# Patient Record
Sex: MALE | Race: OTHER | NOT HISPANIC OR LATINO | URBAN - METROPOLITAN AREA
[De-identification: names, ages, dates, MRNs, and addresses within clinical notes are randomized per-mention and may not be internally consistent; named-entity substitution may affect disease eponyms.]

---

## 2018-12-18 ENCOUNTER — INPATIENT (INPATIENT)
Facility: HOSPITAL | Age: 43
LOS: 5 days | Discharge: HOME | End: 2018-12-24
Attending: INTERNAL MEDICINE | Admitting: INTERNAL MEDICINE
Payer: MEDICAID

## 2018-12-18 VITALS
OXYGEN SATURATION: 97 % | HEART RATE: 118 BPM | SYSTOLIC BLOOD PRESSURE: 141 MMHG | DIASTOLIC BLOOD PRESSURE: 101 MMHG | RESPIRATION RATE: 18 BRPM | TEMPERATURE: 97 F

## 2018-12-18 DIAGNOSIS — I42.9 CARDIOMYOPATHY, UNSPECIFIED: ICD-10-CM

## 2018-12-18 DIAGNOSIS — J06.9 ACUTE UPPER RESPIRATORY INFECTION, UNSPECIFIED: ICD-10-CM

## 2018-12-18 DIAGNOSIS — I50.31 ACUTE DIASTOLIC (CONGESTIVE) HEART FAILURE: ICD-10-CM

## 2018-12-18 DIAGNOSIS — J96.00 ACUTE RESPIRATORY FAILURE, UNSPECIFIED WHETHER WITH HYPOXIA OR HYPERCAPNIA: ICD-10-CM

## 2018-12-18 DIAGNOSIS — R09.89 OTHER SPECIFIED SYMPTOMS AND SIGNS INVOLVING THE CIRCULATORY AND RESPIRATORY SYSTEMS: ICD-10-CM

## 2018-12-18 DIAGNOSIS — I50.9 HEART FAILURE, UNSPECIFIED: ICD-10-CM

## 2018-12-18 DIAGNOSIS — I71.2 THORACIC AORTIC ANEURYSM, WITHOUT RUPTURE: ICD-10-CM

## 2018-12-18 DIAGNOSIS — I25.10 ATHEROSCLEROTIC HEART DISEASE OF NATIVE CORONARY ARTERY WITHOUT ANGINA PECTORIS: ICD-10-CM

## 2018-12-18 DIAGNOSIS — E66.01 MORBID (SEVERE) OBESITY DUE TO EXCESS CALORIES: ICD-10-CM

## 2018-12-18 DIAGNOSIS — I35.1 NONRHEUMATIC AORTIC (VALVE) INSUFFICIENCY: ICD-10-CM

## 2018-12-18 DIAGNOSIS — I11.0 HYPERTENSIVE HEART DISEASE WITH HEART FAILURE: ICD-10-CM

## 2018-12-18 DIAGNOSIS — I27.20 PULMONARY HYPERTENSION, UNSPECIFIED: ICD-10-CM

## 2018-12-18 LAB
ALBUMIN SERPL ELPH-MCNC: 3.8 G/DL — SIGNIFICANT CHANGE UP (ref 3.5–5.2)
ALP SERPL-CCNC: 86 U/L — SIGNIFICANT CHANGE UP (ref 30–115)
ALT FLD-CCNC: 30 U/L — SIGNIFICANT CHANGE UP (ref 0–41)
ANION GAP SERPL CALC-SCNC: 12 MMOL/L — SIGNIFICANT CHANGE UP (ref 7–14)
AST SERPL-CCNC: 24 U/L — SIGNIFICANT CHANGE UP (ref 0–41)
BASOPHILS # BLD AUTO: 0.08 K/UL — SIGNIFICANT CHANGE UP (ref 0–0.2)
BASOPHILS NFR BLD AUTO: 1 % — SIGNIFICANT CHANGE UP (ref 0–1)
BILIRUB SERPL-MCNC: 1 MG/DL — SIGNIFICANT CHANGE UP (ref 0.2–1.2)
BUN SERPL-MCNC: 16 MG/DL — SIGNIFICANT CHANGE UP (ref 10–20)
CALCIUM SERPL-MCNC: 8.5 MG/DL — SIGNIFICANT CHANGE UP (ref 8.5–10.1)
CHLORIDE SERPL-SCNC: 103 MMOL/L — SIGNIFICANT CHANGE UP (ref 98–110)
CK SERPL-CCNC: 122 U/L — SIGNIFICANT CHANGE UP (ref 0–225)
CO2 SERPL-SCNC: 27 MMOL/L — SIGNIFICANT CHANGE UP (ref 17–32)
CREAT SERPL-MCNC: 1 MG/DL — SIGNIFICANT CHANGE UP (ref 0.7–1.5)
D DIMER BLD IA.RAPID-MCNC: 581 NG/ML DDU — HIGH (ref 0–230)
EOSINOPHIL # BLD AUTO: 0.15 K/UL — SIGNIFICANT CHANGE UP (ref 0–0.7)
EOSINOPHIL NFR BLD AUTO: 1.9 % — SIGNIFICANT CHANGE UP (ref 0–8)
GAS PNL BLDV: SIGNIFICANT CHANGE UP
GLUCOSE SERPL-MCNC: 97 MG/DL — SIGNIFICANT CHANGE UP (ref 70–99)
HCT VFR BLD CALC: 46.2 % — SIGNIFICANT CHANGE UP (ref 42–52)
HGB BLD-MCNC: 15.2 G/DL — SIGNIFICANT CHANGE UP (ref 14–18)
IMM GRANULOCYTES NFR BLD AUTO: 0.5 % — HIGH (ref 0.1–0.3)
LYMPHOCYTES # BLD AUTO: 2.89 K/UL — SIGNIFICANT CHANGE UP (ref 1.2–3.4)
LYMPHOCYTES # BLD AUTO: 37.3 % — SIGNIFICANT CHANGE UP (ref 20.5–51.1)
MAGNESIUM SERPL-MCNC: 1.9 MG/DL — SIGNIFICANT CHANGE UP (ref 1.8–2.4)
MCHC RBC-ENTMCNC: 27.8 PG — SIGNIFICANT CHANGE UP (ref 27–31)
MCHC RBC-ENTMCNC: 32.9 G/DL — SIGNIFICANT CHANGE UP (ref 32–37)
MCV RBC AUTO: 84.5 FL — SIGNIFICANT CHANGE UP (ref 80–94)
MONOCYTES # BLD AUTO: 0.66 K/UL — HIGH (ref 0.1–0.6)
MONOCYTES NFR BLD AUTO: 8.5 % — SIGNIFICANT CHANGE UP (ref 1.7–9.3)
NEUTROPHILS # BLD AUTO: 3.92 K/UL — SIGNIFICANT CHANGE UP (ref 1.4–6.5)
NEUTROPHILS NFR BLD AUTO: 50.8 % — SIGNIFICANT CHANGE UP (ref 42.2–75.2)
NT-PROBNP SERPL-SCNC: 448 PG/ML — HIGH (ref 0–300)
PLATELET # BLD AUTO: 223 K/UL — SIGNIFICANT CHANGE UP (ref 130–400)
POTASSIUM SERPL-MCNC: 4.5 MMOL/L — SIGNIFICANT CHANGE UP (ref 3.5–5)
POTASSIUM SERPL-SCNC: 4.5 MMOL/L — SIGNIFICANT CHANGE UP (ref 3.5–5)
PROT SERPL-MCNC: 6.6 G/DL — SIGNIFICANT CHANGE UP (ref 6–8)
RBC # BLD: 5.47 M/UL — SIGNIFICANT CHANGE UP (ref 4.7–6.1)
RBC # FLD: 14 % — SIGNIFICANT CHANGE UP (ref 11.5–14.5)
SODIUM SERPL-SCNC: 142 MMOL/L — SIGNIFICANT CHANGE UP (ref 135–146)
TROPONIN T SERPL-MCNC: <0.01 NG/ML — SIGNIFICANT CHANGE UP
TROPONIN T SERPL-MCNC: <0.01 NG/ML — SIGNIFICANT CHANGE UP
WBC # BLD: 7.74 K/UL — SIGNIFICANT CHANGE UP (ref 4.8–10.8)
WBC # FLD AUTO: 7.74 K/UL — SIGNIFICANT CHANGE UP (ref 4.8–10.8)

## 2018-12-18 RX ORDER — FUROSEMIDE 40 MG
40 TABLET ORAL DAILY
Qty: 0 | Refills: 0 | Status: DISCONTINUED | OUTPATIENT
Start: 2018-12-18 | End: 2018-12-19

## 2018-12-18 RX ORDER — INFLUENZA VIRUS VACCINE 15; 15; 15; 15 UG/.5ML; UG/.5ML; UG/.5ML; UG/.5ML
0.5 SUSPENSION INTRAMUSCULAR ONCE
Qty: 0 | Refills: 0 | Status: COMPLETED | OUTPATIENT
Start: 2018-12-18 | End: 2018-12-24

## 2018-12-18 RX ORDER — ASPIRIN/CALCIUM CARB/MAGNESIUM 324 MG
81 TABLET ORAL DAILY
Qty: 0 | Refills: 0 | Status: DISCONTINUED | OUTPATIENT
Start: 2018-12-18 | End: 2018-12-23

## 2018-12-18 RX ORDER — FUROSEMIDE 40 MG
20 TABLET ORAL ONCE
Qty: 0 | Refills: 0 | Status: COMPLETED | OUTPATIENT
Start: 2018-12-18 | End: 2018-12-18

## 2018-12-18 RX ORDER — ENOXAPARIN SODIUM 100 MG/ML
40 INJECTION SUBCUTANEOUS EVERY 24 HOURS
Qty: 0 | Refills: 0 | Status: DISCONTINUED | OUTPATIENT
Start: 2018-12-18 | End: 2018-12-24

## 2018-12-18 RX ORDER — LISINOPRIL 2.5 MG/1
5 TABLET ORAL DAILY
Qty: 0 | Refills: 0 | Status: DISCONTINUED | OUTPATIENT
Start: 2018-12-18 | End: 2018-12-24

## 2018-12-18 RX ORDER — CHLORHEXIDINE GLUCONATE 213 G/1000ML
1 SOLUTION TOPICAL
Qty: 0 | Refills: 0 | Status: DISCONTINUED | OUTPATIENT
Start: 2018-12-18 | End: 2018-12-24

## 2018-12-18 RX ORDER — ASPIRIN/CALCIUM CARB/MAGNESIUM 324 MG
325 TABLET ORAL ONCE
Qty: 0 | Refills: 0 | Status: COMPLETED | OUTPATIENT
Start: 2018-12-18 | End: 2018-12-18

## 2018-12-18 RX ADMIN — LISINOPRIL 5 MILLIGRAM(S): 2.5 TABLET ORAL at 16:38

## 2018-12-18 RX ADMIN — Medication 20 MILLIGRAM(S): at 15:18

## 2018-12-18 RX ADMIN — Medication 325 MILLIGRAM(S): at 10:21

## 2018-12-18 NOTE — H&P ADULT - NSHPSOCIALHISTORY_GEN_ALL_CORE
Denies Smoking, ETOH or illicit drug use Denies Smoking, ETOH or illicit drug use. Denies any risk factor for sexually transmitted disease or history of syphilis.

## 2018-12-18 NOTE — ED PROVIDER NOTE - NS ED ROS FT
Constitutional:  See HPI.   Eyes:  No visual changes, eye pain or discharge.  ENMT:  No hearing changes, pain, discharge or infections. No neck pain or stiffness.  Cardiac:  No  edema.    Respiratory:  No cough or respiratory distress. No hemoptysis.  GI:  No nausea, vomiting, diarrhea, abdominal pain.  :  No dysuria, frequency, hematuria  MS:  No joint pain or back pain.  Neuro:  No LOC. No headache or weakness.    Skin:  No skin rash.  Except as in HPI, all other review of systems is negative

## 2018-12-18 NOTE — ED PROVIDER NOTE - OBJECTIVE STATEMENT
44yo M no sig pmh non smoker pw shortness of breath and chest tightness since this AM- woke up w sx 5am, +orthopnea, upper chest tightness, LUE tingling- never had sx like this prior, no cardiac hx. Recently returned from [place], 17hr flight. Also co LLE pain, no trauma- no recent illness- no fevers/chills, cough, nausea/vomiting/diarrhea, abdominal pain, numbness/focal weakness, back pain, dysuria/hematuria

## 2018-12-18 NOTE — ED PROVIDER NOTE - MEDICAL DECISION MAKING DETAILS
43 y.o. male, no PMH, comes in c/o SOB which woke him up from sleep at 5am today, associated with some CP. SOB is better when he is sitting up. NO leg pain or swelling. (+) recent travel to [place] ( 17 hr flight). No fever/chills. No cough. No abdominal pain. No n/v/c/d. On exam, pt in NAD, AAOx3, head NC/AT, CN II-XII intact, lungs CTA B/L, CV S1S2 regular, abdomen soft/NT/ND/(+)BS, ext (-) edema. Pt found to have CHF and pulmonary HTN. Lasix given. WIll admit for further work up.

## 2018-12-18 NOTE — CONSULT NOTE ADULT - SUBJECTIVE AND OBJECTIVE BOX
CHIEF COMPLAINT:Patient is a 43y old  Male who presents with a chief complaint of SOB (18 Dec 2018 15:28)      HISTORY OF PRESENT ILLNESS:     43 Yr M no PMH presenting with worsening SOB. He reports SOB, nonproductive cough, dyspnea on exertion, and 3 pillow orthopnea. Pt reports symptoms started 2 week ago and has been progressively getting worse. Pt also reports LLE swelling. He denies chest pain, dizziness, fever, chills, hemoptysis, abd pain, nausea, vomiting or diarrhea. He denies any sick contact, but reports long flight from [place] 3 days ago.  On presentation /101  T 96.7. , Trop neg x1. EKG shows sinus tach. CXR shows vascular congestion, CTA chest shows ascending aortic aneurysm 4.8cm.   In ED  pt received Aspirin 325mg and IV lasix (18 Dec 2018 15:28)    PAST MEDICAL & SURGICAL HISTORY:  No pertinent past medical history  No significant past surgical history    FAMILY HISTORY:  No pertinent family history in first degree relatives    Allergies    No Known Allergies    Home Medications:    MEDICATIONS  (STANDING):  aspirin enteric coated 81 milliGRAM(s) Oral daily  chlorhexidine 4% Liquid 1 Application(s) Topical <User Schedule>  enoxaparin Injectable 40 milliGRAM(s) SubCutaneous every 24 hours  furosemide   Injectable 40 milliGRAM(s) IV Push daily  influenza   Vaccine 0.5 milliLiter(s) IntraMuscular once  lisinopril 5 milliGRAM(s) Oral daily    SOCIAL HISTORY:    [  ] active smoker  [x] non smoker  [  ] Etoh  [  ] recreational drugs    REVIEW OF SYSTEMS:  14 point ROS negative except as mentioned above in HPI    PHYSICAL EXAM:  T(C): 35.8 (12-18-18 @ 20:56), Max: 35.9 (12-18-18 @ 07:32)  HR: 106 (12-18-18 @ 20:56) (90 - 118)  BP: 139/93 (12-18-18 @ 20:56) (137/92 - 157/96)  RR: 16 (12-18-18 @ 20:56) (16 - 18)  SpO2: 97% (12-18-18 @ 16:31) (97% - 100%)  Wt(kg): --  I&O's Summary      General Appearance: in NAD	  HEENT: NC, No JVD appreciated  Cardiovascular: Normal S1 S2, No murmurs  Respiratory: mild b/l  insp. crackles on Ax, R>L  Gastrointestinal:  Soft, Non-tender, BS +	  Neurologic: No focal deficits, AAOx3  Extremities: ROM wnl, No clubbing/cyanosis/edema  Skin: No rashes, No ecchymoses, No cyanosis  Vascular: Peripheral pulses palpable 2+ bilaterally    LABS:	 	                        15.2   7.74  )-----------( 223      ( 18 Dec 2018 08:21 )             46.2     12-18    142  |  103  |  16  ----------------------------<  97  4.5   |  27  |  1.0    Ca    8.5      18 Dec 2018 08:21  Mg     1.9     12-18    TPro  6.6  /  Alb  3.8  /  TBili  1.0  /  DBili  x   /  AST  24  /  ALT  30  /  AlkPhos  86  12-18    eGFR if Non African American: 92 mL/min/1.73M2 (12-18-18 @ 08:21)        Serum Pro-Brain Natriuretic Peptide: 448 pg/mL (12-18-18 @ 08:21)      CARDIAC MARKERS:  12-18-18 @ 08:21  TROPONIN-T  <0.01 ng/mL  CKMB  --  CREATINE KINASE  --    ECG:  < from: 12 Lead ECG (12.18.18 @ 07:42) >  Diagnosis Line Sinus tachycardia  Possible Left atrial enlargement  Nonspecific T wave abnormality  Abnormal ECG    Confirmed by DESIRE CORBETT MD ([id number]) on 12/18/2018 10:42:58 AM    < end of copied text >  	  RADIOLOGY:  < from: Xray Chest 1 View-PORTABLE IMMEDIATE (12.18.18 @ 09:02) >  IMPRESSION:     Cardiomegaly. Pulmonary vascular congestion.        ZANA GORDON M.D., ATTENDING RADIOLOGIST  This document has been electronically signed. Dec 18 2018  1:06PM    < end of copied text >    < from: CT Angio Chest w/ IV Cont (12.18.18 @ 11:58) >    IMPRESSION:  1. No evidence of acute pulmonary embolism.    2. Small bilateral pleural effusions, interstitial and pulmonary edema.   Findings suggestive of CHF in the appropriate clinical setting.    3. Aneurysmal dilation of ascending thoracic aorta, 4.8 cm.     4. Dilation of main pulmonary artery, 4 cm, suggestive of pulmonary   arterial hypertension.            RASHID DAVIDSON M.D., ATTENDING RADIOLOGIST  This document has been electronically signed. Dec 18 2018 12:09PM    < end of copied text >

## 2018-12-18 NOTE — H&P ADULT - ASSESSMENT
43 Yr M no PMH presenting with worsening SOB    #) SOB and Orthopnea likely secondary to new onset decompensated congestive heart failure  - Admit to telemetry  - Vitals stable on admission, no supplemental oxygen requirement  - Trop Neg x1 and EKG shows no ST to T wave changes  - F/U repeat Trop and ECHO  - Cont Lasix IV 40mf QD  - Aspirin 81mg daily  - F/U HbA1C, TSH, and Lipid profile  - I<O, daily weights, strict I&O  - Cardiology Consult    #) LLE swelling R/O DVT  - F/U b/l LE Venous duplex    #) Ascending Aortic aneurysm 4.8cm  - No chest pain on exam, Hemodynamically stable  - Consider CT Surgery Consult vs outpatient close Surveillance    DVT ppx Lovenox Sq  DASH diet  CHG Bath 43 Yr M no PMH presenting with worsening SOB    #) SOB and Orthopnea likely secondary to new onset decompensated congestive heart failure  - Admit to telemetry  - Vitals stable on admission, no supplemental oxygen requirement  - Trop Neg x1 and EKG shows no ST to T wave changes  - F/U repeat Trop and ECHO  - Cont Lasix IV 40mf QD  - Start Aspirin 81mg daily, and Lisinopril 5mg daily  - F/U HbA1C, TSH, and Lipid profile  - I<O, daily weights, strict I&O  - Cardiology Consult    #) LLE swelling R/O DVT  - F/U b/l LE Venous duplex    #) Ascending Aortic aneurysm 4.8cm  - No chest pain on exam, Hemodynamically stable  - Consider CT Surgery Consult vs outpatient close Surveillance    DVT ppx Lovenox Sq  DASH diet  CHG Bath

## 2018-12-18 NOTE — H&P ADULT - FAMILY HISTORY
No pertinent family history in first degree relatives Mother  Still living? Unknown  Family history of diabetes mellitus, Age at diagnosis: Age Unknown     Father  Still living? Unknown  Family history of heart disease, Age at diagnosis: Age Unknown

## 2018-12-18 NOTE — ED ADULT NURSE NOTE - NSIMPLEMENTINTERV_GEN_ALL_ED
Implemented All Universal Safety Interventions:  [place] to call system. Call bell, personal items and telephone within reach. Instruct patient to call for assistance. Room bathroom lighting operational. Non-slip footwear when patient is off stretcher. Physically safe environment: no spills, clutter or unnecessary equipment. Stretcher in lowest position, wheels locked, appropriate side rails in place.

## 2018-12-18 NOTE — CONSULT NOTE ADULT - ASSESSMENT
# Morbid obesity  # Acute resp. failure secondary to possible ADHF  # HTN    - c/w telemetry monitoring  - f/u serial troponins q 4-6 h to r/o ACS  - TTE to assess LVEF  - c/w IV lasix 40 mg bid,   - f/u Bun/Cr q24h, lytes q24 h  - low sodium diet, strict I&O charting, check daily weights  - check fasting lipid panel, HbA1C to assess ASCVD risk  - treat BP to target of 120/80. Start on enalapril 5mg PO q 24 h  - If EF reduced, will need coronary angiogram to r/o CAD  - will follow for further management

## 2018-12-18 NOTE — H&P ADULT - NSHPLABSRESULTS_GEN_ALL_CORE
15.2   7.74  )-----------( 223      ( 18 Dec 2018 08:21 )             46.2     12-18    142  |  103  |  16  ----------------------------<  97  4.5   |  27  |  1.0    Ca    8.5      18 Dec 2018 08:21  Mg     1.9     12-18    TPro  6.6  /  Alb  3.8  /  TBili  1.0  /  DBili  x   /  AST  24  /  ALT  30  /  AlkPhos  86  12-18    < from: CT Angio Chest w/ IV Cont (12.18.18 @ 11:58) >    1. No evidence of acute pulmonary embolism.    2. Small bilateral pleural effusions, interstitial and pulmonary edema.   Findings suggestive of CHF in the appropriate clinical setting.    3. Aneurysmal dilation of ascending thoracic aorta, 4.8 cm.     4. Dilation of main pulmonary artery, 4 cm, suggestive of pulmonary   arterial hypertension.    < end of copied text >

## 2018-12-18 NOTE — ED PROVIDER NOTE - PHYSICAL EXAMINATION
Con: Well appearing Uncomfortable appearing non toxic.   HEENT: NCAT EOMI conjunctiva nml. No nasal discharge. MMM.   Neck: Supple, non tender, full ROM.   CV: regular, tachycardic no MRG +S1S2.   Pulm: CTA b/l.   Abd: s NT ND +BS.   Ext: WWP x4, moving all extremities, no edema. +L calf ttp, no erythema, edema, warmth, crepitus  Psy: Cooperative, appropriate.   Skin: Warm, dry, no rash

## 2018-12-18 NOTE — ED PROVIDER NOTE - ATTENDING CONTRIBUTION TO CARE
43 y.o. male, no PMH, comes in c/o SOB which woke him up from sleep at 5am today, associated with some CP. SOB is better when he is sitting up. NO leg pain or swelling. (+) recent travel to [place] ( 17 hr flight). No fever/chills. No cough. No abdominal pain. No n/v/c/d. On exam, pt in NAD, AAOx3, head NC/AT, CN II-XII intact, lungs CTA B/L, CV S1S2 regular, abdomen soft/NT/ND/(+)BS, ext (-) edema. Will do labs, CXR, EKG and reevaluate.

## 2018-12-18 NOTE — H&P ADULT - HISTORY OF PRESENT ILLNESS
43 Yr M no PMH presenting with worsening SOB. He reports SOB, nonproductive cough, dyspnea on exertion, and 3 pillow orthopnea. Pt reports symptoms started 2 week ago and has been progressively getting worse. Pt also reports LLE swelling. He denies chest pain, dizziness, fever, chills, hemoptysis, abd pain, nausea, vomiting or diarrhea. He denies any sick contact, but reports long flight from [place] 3 days ago.  On presentation /101  T 96.7. , Trop neg x1. EKG shows sinus tach. CXR shows vascular congestion, CTA chest shows ascending aortic aneurysm 4.8cm.   In ED  pt received Aspirin 325mg and IV lasix

## 2018-12-18 NOTE — H&P ADULT - ATTENDING COMMENTS
Patient is seen and examined independently at bedside. I agree with the resident's note, history and plan as above. Pt denies any PMH or family history of connective tissue disorder. Pt states that his father  young due to heart condition but does not know the specifics. Pt admits to have low exercise tolerance since young which he contributes to his weight but no known childhood illness, congenital disease, hypertension or prior illness affecting the heart and lungs. 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    T(F): 96.4 (-18-18 @ 20:56), Max: 96.7 (-18-18 @ 07:32)  HR: 106 (--18 @ 20:56) (90 - 118)  BP: 139/93 (-18-18 @ 20:56) (137/92 - 157/96)  RR: 16 (-18-18 @ 20:56) (16 - 18)  SpO2: 97% (18 @ 16:31) (97% - 100%)    PHYSICAL EXAM:  CONSTITUTIONAL: NAD, well-groomed, well-developed, obese.  HEAD:  Atraumatic, Normocephalic.  EYES: EOMI, PERRLA, conjunctiva and sclera clear.  ENMT: Moist mucous membranes, No lesions.  NERVOUS SYSTEM:  Alert & Oriented X3, Good concentration; No limb weakness or numbness.  RESPIRATORY: Clear to ascultation bilaterally; No rales, rhonchi, wheezing, or rubs.  CARDIOVASCULAR: Regular rate and rhythm; No murmurs, rubs, or gallops.  GASTROINTESTINAL: Soft, Nontender, Nondistended; Bowel sounds present.  MUSCULOSKELETAL: No joint swelling or tenderness. No neck or back pain.  EXTREMITIES: No clubbing, cyanosis. Bilateral lower extremity edema.  LYMPH: No lymphadenopathy noted.  SKIN: No rashes or lesions. No lesions over palms and soles.    # Acute, new onset CHF exacerbation  - finding of pulmonary artery dilation suspected pulmonary artery hypertension  - tele-monitoring  - trend cardiac enzymes  - check 2D Echo  - c/w IV lasix 40 mg daily for now, strict I+Os  - need pulmonary evaluation outpatient for sleep study and work up and management for pulmonary artery hypertension    # Thoracic aorta aneurysm 4.8 cm  - medical and risk factors optimization with statin and BP control with beta-blocker, ARB/ACE-inhibitor, SBP goal <120 , currently started on lisinopril 5 mg, may add on beta-blocker if BP tolerates  - obtain lipid profile and [id number]  - pt is explained the findings, need for consistent outpatient monitoring, the risk of rupture from expansion, the need for repair if it expands beyond certain width  - syphilis screen    # Bilateral lower extremity  - rule out DVT  - order for venous duplex    All labs, radiologic studies, vitals, orders and medications list reviewed. Patient is seen and examined independently at bedside. I agree with the resident's note, history and plan as above. Pt denies any PMH or family history of connective tissue disorder. Pt states that his father  young due to heart condition but does not know the specifics. Pt admits to have low exercise tolerance since young which he contributes to his weight but no known childhood illness, congenital disease, hypertension or prior illness affecting the heart and lungs. 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    T(F): 96.4 (-18-18 @ 20:56), Max: 96.7 (-18-18 @ 07:32)  HR: 106 (--18 @ 20:56) (90 - 118)  BP: 139/93 (-18-18 @ 20:56) (137/92 - 157/96)  RR: 16 (-18-18 @ 20:56) (16 - 18)  SpO2: 97% (18 @ 16:31) (97% - 100%)    PHYSICAL EXAM:  CONSTITUTIONAL: NAD, well-groomed, well-developed, obese.  HEAD:  Atraumatic, Normocephalic.  EYES: EOMI, PERRLA, conjunctiva and sclera clear.  ENMT: Moist mucous membranes, No lesions.  NERVOUS SYSTEM:  Alert & Oriented X3, Good concentration; No limb weakness or numbness.  RESPIRATORY: Clear to ascultation bilaterally; No rales, rhonchi, wheezing, or rubs.  CARDIOVASCULAR: Regular rate and rhythm; No murmurs, rubs, or gallops.  GASTROINTESTINAL: Soft, Nontender, Nondistended; Bowel sounds present.  MUSCULOSKELETAL: No joint swelling or tenderness. No neck or back pain.  EXTREMITIES: No clubbing, cyanosis. Bilateral lower extremity edema.  LYMPH: No lymphadenopathy noted.  SKIN: No rashes or lesions. No lesions over palms and soles.    # Acute, new onset CHF exacerbation  - finding of pulmonary artery dilation suspected pulmonary artery hypertension  - tele-monitoring  - trend cardiac enzymes  - check 2D Echo  - c/w IV lasix 40 mg daily for now, strict I+Os  - need pulmonary evaluation outpatient for sleep study and work up and management for pulmonary artery hypertension    # Thoracic aorta aneurysm 4.8 cm  - medical and risk factors optimization with statin and BP control with beta-blocker, ARB/ACE-inhibitor, SBP goal <120 , currently started on lisinopril 5 mg, may add on beta-blocker if BP tolerates  - obtain lipid profile and [id number]  - pt is explained the findings, need for consistent outpatient monitoring, the risk of rupture from expansion, the need for repair if it expands beyond certain width  - syphilis screen    # Bilateral lower extremity edema  - rule out DVT  - order for venous duplex    All labs, radiologic studies, vitals, orders and medications list reviewed.

## 2018-12-19 LAB
ANION GAP SERPL CALC-SCNC: 15 MMOL/L — HIGH (ref 7–14)
BASOPHILS # BLD AUTO: 0.07 K/UL — SIGNIFICANT CHANGE UP (ref 0–0.2)
BASOPHILS NFR BLD AUTO: 0.9 % — SIGNIFICANT CHANGE UP (ref 0–1)
BUN SERPL-MCNC: 19 MG/DL — SIGNIFICANT CHANGE UP (ref 10–20)
CALCIUM SERPL-MCNC: 9 MG/DL — SIGNIFICANT CHANGE UP (ref 8.5–10.1)
CHLORIDE SERPL-SCNC: 102 MMOL/L — SIGNIFICANT CHANGE UP (ref 98–110)
CHOLEST SERPL-MCNC: 194 MG/DL — SIGNIFICANT CHANGE UP (ref 100–200)
CK SERPL-CCNC: 94 U/L — SIGNIFICANT CHANGE UP (ref 0–225)
CO2 SERPL-SCNC: 25 MMOL/L — SIGNIFICANT CHANGE UP (ref 17–32)
CREAT SERPL-MCNC: 1.1 MG/DL — SIGNIFICANT CHANGE UP (ref 0.7–1.5)
EOSINOPHIL # BLD AUTO: 0.17 K/UL — SIGNIFICANT CHANGE UP (ref 0–0.7)
EOSINOPHIL NFR BLD AUTO: 2.3 % — SIGNIFICANT CHANGE UP (ref 0–8)
ESTIMATED AVERAGE GLUCOSE: 128 MG/DL — HIGH (ref 68–114)
GLUCOSE SERPL-MCNC: 107 MG/DL — HIGH (ref 70–99)
HBA1C BLD-MCNC: 6.1 % — HIGH (ref 4–5.6)
HCT VFR BLD CALC: 46.7 % — SIGNIFICANT CHANGE UP (ref 42–52)
HDLC SERPL-MCNC: 40 MG/DL — SIGNIFICANT CHANGE UP
HGB BLD-MCNC: 15.2 G/DL — SIGNIFICANT CHANGE UP (ref 14–18)
IMM GRANULOCYTES NFR BLD AUTO: 0.4 % — HIGH (ref 0.1–0.3)
LIPID PNL WITH DIRECT LDL SERPL: 137 MG/DL — HIGH (ref 4–129)
LYMPHOCYTES # BLD AUTO: 2.32 K/UL — SIGNIFICANT CHANGE UP (ref 1.2–3.4)
LYMPHOCYTES # BLD AUTO: 31.3 % — SIGNIFICANT CHANGE UP (ref 20.5–51.1)
MAGNESIUM SERPL-MCNC: 2 MG/DL — SIGNIFICANT CHANGE UP (ref 1.8–2.4)
MCHC RBC-ENTMCNC: 27.2 PG — SIGNIFICANT CHANGE UP (ref 27–31)
MCHC RBC-ENTMCNC: 32.5 G/DL — SIGNIFICANT CHANGE UP (ref 32–37)
MCV RBC AUTO: 83.7 FL — SIGNIFICANT CHANGE UP (ref 80–94)
MONOCYTES # BLD AUTO: 0.67 K/UL — HIGH (ref 0.1–0.6)
MONOCYTES NFR BLD AUTO: 9 % — SIGNIFICANT CHANGE UP (ref 1.7–9.3)
NEUTROPHILS # BLD AUTO: 4.16 K/UL — SIGNIFICANT CHANGE UP (ref 1.4–6.5)
NEUTROPHILS NFR BLD AUTO: 56.1 % — SIGNIFICANT CHANGE UP (ref 42.2–75.2)
NRBC # BLD: 0 /100 WBCS — SIGNIFICANT CHANGE UP (ref 0–0)
PLATELET # BLD AUTO: 204 K/UL — SIGNIFICANT CHANGE UP (ref 130–400)
POTASSIUM SERPL-MCNC: 4.1 MMOL/L — SIGNIFICANT CHANGE UP (ref 3.5–5)
POTASSIUM SERPL-SCNC: 4.1 MMOL/L — SIGNIFICANT CHANGE UP (ref 3.5–5)
RBC # BLD: 5.58 M/UL — SIGNIFICANT CHANGE UP (ref 4.7–6.1)
RBC # FLD: 14.2 % — SIGNIFICANT CHANGE UP (ref 11.5–14.5)
SODIUM SERPL-SCNC: 142 MMOL/L — SIGNIFICANT CHANGE UP (ref 135–146)
TOTAL CHOLESTEROL/HDL RATIO MEASUREMENT: 4.8 RATIO — SIGNIFICANT CHANGE UP (ref 4–5.5)
TRIGL SERPL-MCNC: 165 MG/DL — HIGH (ref 10–149)
TROPONIN T SERPL-MCNC: <0.01 NG/ML — SIGNIFICANT CHANGE UP
TSH SERPL-MCNC: 1.9 UIU/ML — SIGNIFICANT CHANGE UP (ref 0.27–4.2)
WBC # BLD: 7.42 K/UL — SIGNIFICANT CHANGE UP (ref 4.8–10.8)
WBC # FLD AUTO: 7.42 K/UL — SIGNIFICANT CHANGE UP (ref 4.8–10.8)

## 2018-12-19 PROCEDURE — 93970 EXTREMITY STUDY: CPT | Mod: 26

## 2018-12-19 RX ORDER — ATORVASTATIN CALCIUM 80 MG/1
40 TABLET, FILM COATED ORAL AT BEDTIME
Qty: 0 | Refills: 0 | Status: DISCONTINUED | OUTPATIENT
Start: 2018-12-19 | End: 2018-12-23

## 2018-12-19 RX ORDER — FUROSEMIDE 40 MG
40 TABLET ORAL
Qty: 0 | Refills: 0 | Status: DISCONTINUED | OUTPATIENT
Start: 2018-12-19 | End: 2018-12-21

## 2018-12-19 RX ADMIN — Medication 81 MILLIGRAM(S): at 12:35

## 2018-12-19 RX ADMIN — Medication 40 MILLIGRAM(S): at 06:28

## 2018-12-19 RX ADMIN — ATORVASTATIN CALCIUM 40 MILLIGRAM(S): 80 TABLET, FILM COATED ORAL at 21:46

## 2018-12-19 RX ADMIN — ENOXAPARIN SODIUM 40 MILLIGRAM(S): 100 INJECTION SUBCUTANEOUS at 06:27

## 2018-12-19 RX ADMIN — Medication 40 MILLIGRAM(S): at 17:19

## 2018-12-19 RX ADMIN — LISINOPRIL 5 MILLIGRAM(S): 2.5 TABLET ORAL at 06:27

## 2018-12-19 NOTE — PROGRESS NOTE ADULT - SUBJECTIVE AND OBJECTIVE BOX
SUBJECTIVE:    Patient is a 43y old Male who presents with a chief complaint of SOB (18 Dec 2018 21:52)    Currently admitted to medicine with the primary diagnosis of CHF (congestive heart failure)     Today is hospital day 1d. This morning he is resting comfortably in bed and reports no new issues or overnight events.     PAST MEDICAL & SURGICAL HISTORY  No pertinent past medical history  No significant past surgical history    SOCIAL HISTORY:    ALLERGIES:  No Known Allergies    MEDICATIONS:  STANDING MEDICATIONS  aspirin enteric coated 81 milliGRAM(s) Oral daily  atorvastatin 40 milliGRAM(s) Oral at bedtime  chlorhexidine 4% Liquid 1 Application(s) Topical <User Schedule>  enoxaparin Injectable 40 milliGRAM(s) SubCutaneous every 24 hours  furosemide   Injectable 40 milliGRAM(s) IV Push daily  influenza   Vaccine 0.5 milliLiter(s) IntraMuscular once  lisinopril 5 milliGRAM(s) Oral daily    PRN MEDICATIONS    VITALS:   T(F): 96.4  HR: 85  BP: 120/77  RR: 18  SpO2: 97%    LABS:                        15.2   7.42  )-----------( 204      ( 19 Dec 2018 07:04 )             46.7     12-19    142  |  102  |  19  ----------------------------<  107<H>  4.1   |  25  |  1.1    Ca    9.0      19 Dec 2018 07:04  Mg     2.0     12-19    TPro  6.6  /  Alb  3.8  /  TBili  1.0  /  DBili  x   /  AST  24  /  ALT  30  /  AlkPhos  86  12-18          Creatine Kinase, Serum: 94 U/L (12-19-18 @ 07:04)  Troponin T, Serum: <0.01 ng/mL (12-19-18 @ 07:04)  Troponin T, Serum: <0.01 ng/mL (12-18-18 @ 21:05)  Creatine Kinase, Serum: 122 U/L (12-18-18 @ 21:05)      CARDIAC MARKERS ( 19 Dec 2018 07:04 )  x     / <0.01 ng/mL / 94 U/L / x     / x      CARDIAC MARKERS ( 18 Dec 2018 21:05 )  x     / <0.01 ng/mL / 122 U/L / x     / x      CARDIAC MARKERS ( 18 Dec 2018 08:21 )  x     / <0.01 ng/mL / x     / x     / x          Troponin T, Serum: <0.01 ng/mL (12-19-18 @ 07:04)  Troponin T, Serum: <0.01 ng/mL (12-18-18 @ 21:05)  Troponin T, Serum: <0.01 ng/mL (12-18-18 @ 08:21)  Serum Pro-Brain Natriuretic Peptide: 448 pg/mL (12-18-18 @ 08:21)      RADIOLOGY:    < from: 12 Lead ECG (12.18.18 @ 07:42) >  Diagnosis Line Sinus tachycardia  Possible Left atrial enlargement  Nonspecific T wave abnormality  Abnormal ECG    Confirmed by DESIRE CORBETT MD ([id number]) on 12/18/2018 10:42:58 AM    < end of copied text >  	  RADIOLOGY:  < from: Xray Chest 1 View-PORTABLE IMMEDIATE (12.18.18 @ 09:02) >  IMPRESSION:     Cardiomegaly. Pulmonary vascular congestion.      < end of copied text >    < from: CT Angio Chest w/ IV Cont (12.18.18 @ 11:58) >    IMPRESSION:  1. No evidence of acute pulmonary embolism.    2. Small bilateral pleural effusions, interstitial and pulmonary edema.   Findings suggestive of CHF in the appropriate clinical setting.    3. Aneurysmal dilation of ascending thoracic aorta, 4.8 cm.     4. Dilation of main pulmonary artery, 4 cm, suggestive of pulmonary   arterial hypertension.      PHYSICAL EXAM:  GENERAL: NAD, well-groomed, well-developed  HEAD:  NCAT  EYES: EOMI, PERRLA, conjunctiva clear  ENMT: No tonsillar erythema, exudates, or enlargement; Moist mucous membranes, Good dentition, No lesions  NECK: Supple, No JVD, Normal thyroid  NERVOUS SYSTEM: AAOX4, Good concentration; Motor Strength 5/5 B/L upper and lower extremities  CHEST/LUNG: b/l crackles  HEART: +s1s2 RRR no m/g/r  ABDOMEN: soft, NT/ND (+) bs, no HSM  EXTREMITIES:  2+ Peripheral Pulses, No c/c. 2+ NILSON  LYMPH: No lymphadenopathy noted  SKIN: No rashes or lesions

## 2018-12-19 NOTE — PROGRESS NOTE ADULT - ASSESSMENT
43 Yr M no PMH presenting with worsening SOB    Acute, new onset CHF exacerbation  - finding of pulmonary artery dilation suspected pulmonary artery hypertension  - tele-monitoring  - trend cardiac enzymes  - f/u TTE  - strict I+Os  - need pulmonary evaluation outpatient for sleep study and work up and management for pulmonary artery hypertension  -cardio: TTE to assess LVEF. c/w IV lasix 40 mg bid,  f/u Bun/Cr q24h, lytes q24 h. low sodium diet, strict I&O charting, check daily weights. check fasting lipid panel, HbA1C to assess ASCVD risk. treat BP to target of 120/80. Start on enalapril 5mg PO q 24 h. If EF reduced, will need coronary angiogram to r/o CAD    Thoracic aorta aneurysm 4.8 cm  - f/u lipid profile and HbA1C  - pt is explained the findings, need for consistent outpatient monitoring, the risk of rupture from expansion, the need for repair if it expands beyond certain width  -f/u syphilis screen    Bilateral lower extremity edema  - rule out DVT  - f/u LE duplex    DVT ppx  CHG 4% daily and PRN  OOBTC  DASH/TLC, 1.5L fluid  FULL CODE 43 Yr M no PMH presenting with worsening SOB    Acute, new onset CHF exacerbation  - finding of pulmonary artery dilation suspected pulmonary artery hypertension  - tele-monitoring  - trend cardiac enzymes  - f/u TTE  - strict I+Os  - need pulmonary evaluation outpatient for sleep study and work up and management for pulmonary artery hypertension  - cardio: TTE to assess LVEF. c/w IV lasix 40 mg bid,  f/u Bun/Cr q24h, lytes q24 h. low sodium diet, strict I&O charting, check daily weights. check fasting lipid panel, HbA1C to assess ASCVD risk. treat BP to target of 120/80. Start on enalapril 5mg PO q 24 h. If EF reduced, will need coronary angiogram to r/o CAD    Thoracic aorta aneurysm 4.8 cm  -   HDL 40  - f/u HbA1C  - pt is explained the findings, need for consistent outpatient monitoring, the risk of rupture from expansion, the need for repair if it expands beyond certain width  - f/u syphilis screen    Bilateral lower extremity edema  - rule out DVT  - f/u LE duplex    DVT ppx  CHG 4% daily and PRN  OOBTC  DASH/TLC, 1.5L fluid  FULL CODE

## 2018-12-20 LAB
ANION GAP SERPL CALC-SCNC: 15 MMOL/L — HIGH (ref 7–14)
BASOPHILS # BLD AUTO: 0.08 K/UL — SIGNIFICANT CHANGE UP (ref 0–0.2)
BASOPHILS NFR BLD AUTO: 0.9 % — SIGNIFICANT CHANGE UP (ref 0–1)
BUN SERPL-MCNC: 21 MG/DL — HIGH (ref 10–20)
CALCIUM SERPL-MCNC: 9.5 MG/DL — SIGNIFICANT CHANGE UP (ref 8.5–10.1)
CHLORIDE SERPL-SCNC: 98 MMOL/L — SIGNIFICANT CHANGE UP (ref 98–110)
CO2 SERPL-SCNC: 28 MMOL/L — SIGNIFICANT CHANGE UP (ref 17–32)
CREAT SERPL-MCNC: 1 MG/DL — SIGNIFICANT CHANGE UP (ref 0.7–1.5)
EOSINOPHIL # BLD AUTO: 0.18 K/UL — SIGNIFICANT CHANGE UP (ref 0–0.7)
EOSINOPHIL NFR BLD AUTO: 2.1 % — SIGNIFICANT CHANGE UP (ref 0–8)
GLUCOSE SERPL-MCNC: 164 MG/DL — HIGH (ref 70–99)
HCT VFR BLD CALC: 48.5 % — SIGNIFICANT CHANGE UP (ref 42–52)
HGB BLD-MCNC: 15.8 G/DL — SIGNIFICANT CHANGE UP (ref 14–18)
IMM GRANULOCYTES NFR BLD AUTO: 0.4 % — HIGH (ref 0.1–0.3)
LYMPHOCYTES # BLD AUTO: 2.89 K/UL — SIGNIFICANT CHANGE UP (ref 1.2–3.4)
LYMPHOCYTES # BLD AUTO: 33.7 % — SIGNIFICANT CHANGE UP (ref 20.5–51.1)
MAGNESIUM SERPL-MCNC: 2 MG/DL — SIGNIFICANT CHANGE UP (ref 1.8–2.4)
MCHC RBC-ENTMCNC: 27.5 PG — SIGNIFICANT CHANGE UP (ref 27–31)
MCHC RBC-ENTMCNC: 32.6 G/DL — SIGNIFICANT CHANGE UP (ref 32–37)
MCV RBC AUTO: 84.5 FL — SIGNIFICANT CHANGE UP (ref 80–94)
MONOCYTES # BLD AUTO: 0.72 K/UL — HIGH (ref 0.1–0.6)
MONOCYTES NFR BLD AUTO: 8.4 % — SIGNIFICANT CHANGE UP (ref 1.7–9.3)
NEUTROPHILS # BLD AUTO: 4.67 K/UL — SIGNIFICANT CHANGE UP (ref 1.4–6.5)
NEUTROPHILS NFR BLD AUTO: 54.5 % — SIGNIFICANT CHANGE UP (ref 42.2–75.2)
NRBC # BLD: 0 /100 WBCS — SIGNIFICANT CHANGE UP (ref 0–0)
PLATELET # BLD AUTO: 238 K/UL — SIGNIFICANT CHANGE UP (ref 130–400)
POTASSIUM SERPL-MCNC: 4.1 MMOL/L — SIGNIFICANT CHANGE UP (ref 3.5–5)
POTASSIUM SERPL-SCNC: 4.1 MMOL/L — SIGNIFICANT CHANGE UP (ref 3.5–5)
RBC # BLD: 5.74 M/UL — SIGNIFICANT CHANGE UP (ref 4.7–6.1)
RBC # FLD: 14.2 % — SIGNIFICANT CHANGE UP (ref 11.5–14.5)
SODIUM SERPL-SCNC: 141 MMOL/L — SIGNIFICANT CHANGE UP (ref 135–146)
T PALLIDUM AB TITR SER: NEGATIVE — SIGNIFICANT CHANGE UP
WBC # BLD: 8.57 K/UL — SIGNIFICANT CHANGE UP (ref 4.8–10.8)
WBC # FLD AUTO: 8.57 K/UL — SIGNIFICANT CHANGE UP (ref 4.8–10.8)

## 2018-12-20 RX ORDER — METOPROLOL TARTRATE 50 MG
50 TABLET ORAL ONCE
Qty: 0 | Refills: 0 | Status: COMPLETED | OUTPATIENT
Start: 2018-12-20 | End: 2018-12-20

## 2018-12-20 RX ADMIN — ATORVASTATIN CALCIUM 40 MILLIGRAM(S): 80 TABLET, FILM COATED ORAL at 21:21

## 2018-12-20 RX ADMIN — Medication 50 MILLIGRAM(S): at 11:04

## 2018-12-20 RX ADMIN — Medication 50 MILLIGRAM(S): at 09:14

## 2018-12-20 RX ADMIN — ENOXAPARIN SODIUM 40 MILLIGRAM(S): 100 INJECTION SUBCUTANEOUS at 06:29

## 2018-12-20 RX ADMIN — Medication 40 MILLIGRAM(S): at 17:08

## 2018-12-20 RX ADMIN — LISINOPRIL 5 MILLIGRAM(S): 2.5 TABLET ORAL at 06:29

## 2018-12-20 RX ADMIN — Medication 40 MILLIGRAM(S): at 06:29

## 2018-12-20 RX ADMIN — Medication 81 MILLIGRAM(S): at 11:04

## 2018-12-20 NOTE — PROGRESS NOTE ADULT - SUBJECTIVE AND OBJECTIVE BOX
SUBJECTIVE:    Patient is a 43y old Male who presents with a chief complaint of Shortness of breath (20 Dec 2018 09:05)    Currently admitted to medicine with the primary diagnosis of CHF (congestive heart failure)     Today is hospital day 2d.     PAST MEDICAL & SURGICAL HISTORY  No pertinent past medical history  No significant past surgical history    ALLERGIES:  No Known Allergies    MEDICATIONS:  STANDING MEDICATIONS  aspirin enteric coated 81 milliGRAM(s) Oral daily  atorvastatin 40 milliGRAM(s) Oral at bedtime  chlorhexidine 4% Liquid 1 Application(s) Topical <User Schedule>  enoxaparin Injectable 40 milliGRAM(s) SubCutaneous every 24 hours  furosemide   Injectable 40 milliGRAM(s) IV Push two times a day  influenza   Vaccine 0.5 milliLiter(s) IntraMuscular once  lisinopril 5 milliGRAM(s) Oral daily  metoprolol tartrate 50 milliGRAM(s) Oral once    PRN MEDICATIONS    VITALS:   T(F): 97  HR: 94  BP: 112/74  RR: 18  SpO2: 92%    LABS:                        15.8   8.57  )-----------( 238      ( 20 Dec 2018 07:35 )             48.5     12-20    141  |  98  |  21<H>  ----------------------------<  164<H>  4.1   |  28  |  1.0    Ca    9.5      20 Dec 2018 07:35  Mg     2.0     12-20                CARDIAC MARKERS ( 19 Dec 2018 07:04 )  x     / <0.01 ng/mL / 94 U/L / x     / x      CARDIAC MARKERS ( 18 Dec 2018 21:05 )  x     / <0.01 ng/mL / 122 U/L / x     / x          RADIOLOGY:    PHYSICAL EXAM:  GEN: No acute distress, heavily built  LUNGS: Clear to auscultation bilaterally   HEART: S1/S2 present. RRR.   ABD/ GI: Soft, non-tender, non-distended. Bowel sounds present  EXT: NC/NC/NE/2+PP/NASH  NEURO: AAOX3

## 2018-12-20 NOTE — PROGRESS NOTE ADULT - SUBJECTIVE AND OBJECTIVE BOX
SUBJECTIVE:    Patient is a 43y old Male who presents with a chief complaint of Shortness of breath (19 Dec 2018 08:58)    Currently admitted to medicine with the primary diagnosis of CHF (congestive heart failure)     Today is hospital day 2d. This morning he is resting comfortably in bed and reports no new issues or overnight events.     PAST MEDICAL & SURGICAL HISTORY  No pertinent past medical history  No significant past surgical history    SOCIAL HISTORY:    ALLERGIES:  No Known Allergies    MEDICATIONS:  STANDING MEDICATIONS  aspirin enteric coated 81 milliGRAM(s) Oral daily  atorvastatin 40 milliGRAM(s) Oral at bedtime  chlorhexidine 4% Liquid 1 Application(s) Topical <User Schedule>  enoxaparin Injectable 40 milliGRAM(s) SubCutaneous every 24 hours  furosemide   Injectable 40 milliGRAM(s) IV Push two times a day  influenza   Vaccine 0.5 milliLiter(s) IntraMuscular once  lisinopril 5 milliGRAM(s) Oral daily  metoprolol tartrate 50 milliGRAM(s) Oral once    PRN MEDICATIONS    VITALS:   T(F): 97  HR: 94  BP: 112/74  RR: 18  SpO2: 92%    LABS:                        15.8   8.57  )-----------( 238      ( 20 Dec 2018 07:35 )             48.5     12-20    141  |  98  |  21<H>  ----------------------------<  164<H>  4.1   |  28  |  1.0    Ca    9.5      20 Dec 2018 07:35  Mg     2.0     12-20      CARDIAC MARKERS ( 19 Dec 2018 07:04 )  x     / <0.01 ng/mL / 94 U/L / x     / x      CARDIAC MARKERS ( 18 Dec 2018 21:05 )  x     / <0.01 ng/mL / 122 U/L / x     / x          Troponin T, Serum: <0.01 ng/mL (12-19-18 @ 07:04)  Troponin T, Serum: <0.01 ng/mL (12-18-18 @ 21:05)  Troponin T, Serum: <0.01 ng/mL (12-18-18 @ 08:21)  Serum Pro-Brain Natriuretic Peptide: 448 pg/mL (12-18-18 @ 08:21)    < from: Transthoracic Echocardiogram (12.19.18 @ 13:06) >  Summary:   1. Left ventricular ejection fraction, by visual estimation, is 20 to   25%.   2. Moderately enlarged left atrium.   3. Mild tricuspid regurgitation.   4. Mild aortic regurgitation.   5. Trace pulmonic valve regurgitation.   6. Dilatation of the aortic root.    < end of copied text >      PHYSICAL EXAM:  GENERAL: NAD, well-groomed, well-developed  HEAD:  NCAT  EYES: EOMI, PERRLA, conjunctiva clear  ENMT: No tonsillar erythema, exudates, or enlargement; Moist mucous membranes, Good dentition, No lesions  NECK: Supple  NERVOUS SYSTEM: AAOX4, Good concentration; Motor Strength 5/5 B/L upper and lower extremities  CHEST/LUNG: mild b/l crackles  HEART: +s1s2 RRR no m/g/r  ABDOMEN: soft, NT/ND (+) bs, no HSM  EXTREMITIES:  2+ Peripheral Pulses, No c/c. 2+ NILSON  LYMPH: No lymphadenopathy noted  SKIN: No rashes or lesions

## 2018-12-20 NOTE — PROGRESS NOTE ADULT - ASSESSMENT
Acute, new onset CHF exacerbation  - finding of pulmonary artery dilation suspected pulmonary artery hypertension  - tele-monitoring  - cardiac enzymes are negative  - 2D Echo< from: Transthoracic Echocardiogram (12.19.18 @ 13:06) >   1. Left ventricular ejection fraction, by visual estimation, is 20 to   25%.   2. Moderately enlarged left atrium.   3. Mild tricuspid regurgitation.   4. Mild aortic regurgitation.   5. Trace pulmonic valve regurgitation.   6. Dilatation of the aortic root.    < end of copied text >    - c/w IV lasix 40 mg daily for now, strict I+Os  - need pulmonary evaluation outpatient for sleep study and work up and management for pulmonary artery hypertension  --will get ct coronary today to r/o ischemic heart disease    # Thoracic aorta aneurysm 4.8 cm  - medical and risk factors optimization with statin and BP control with beta-blocker, ARB/ACE-inhibitor, SBP goal <120 , currently started on lisinopril 5 mg, may add on beta-blocker if BP tolerates  - - pt is explained the findings, need for consistent outpatient monitoring, the risk of rupture from expansion, the need for repair if it expands beyond certain width  - syphilis screen    # Bilateral lower extremity edema  -  venous duplex-- no DVT Acute, new onset CHF exacerbation  - finding of pulmonary artery dilation suspected pulmonary artery hypertension  - tele-monitoring  - cardiac enzymes are negative  - 2D Echo< from: Transthoracic Echocardiogram (12.19.18 @ 13:06) >   1. Left ventricular ejection fraction, by visual estimation, is 20 to   25%.   2. Moderately enlarged left atrium.   3. Mild tricuspid regurgitation.   4. Mild aortic regurgitation.   5. Trace pulmonic valve regurgitation.   6. Dilatation of the aortic root.    < end of copied text >    - c/w IV lasix 40 mg daily for now, strict I+Os  - need pulmonary evaluation outpatient for sleep study and work up and management for pulmonary artery hypertension  --will need angiogram to r/o ischemic heart disease    # Thoracic aorta aneurysm 4.8 cm  - medical and risk factors optimization with statin and BP control with beta-blocker, ARB/ACE-inhibitor, SBP goal <120 , currently started on lisinopril 5 mg, may add on beta-blocker if BP tolerates  - - pt is explained the findings, need for consistent outpatient monitoring, the risk of rupture from expansion, the need for repair if it expands beyond certain width  - syphilis screen    # Bilateral lower extremity edema  -  venous duplex-- no DVT

## 2018-12-20 NOTE — PROGRESS NOTE ADULT - ASSESSMENT
43 Yr M no PMH presenting with worsening SOB    Acute, new onset CHF exacerbation  - finding of pulmonary artery dilation suspected pulmonary artery hypertension  - tele-monitoring  - trend cardiac enzymes  - TTE: EF 20-25%  - strict I+Os  - need pulmonary evaluation outpatient for sleep study and work up and management for pulmonary artery hypertension  - cardio: TTE to assess LVEF. c/w IV lasix 40 mg bid,  f/u Bun/Cr q24h, lytes q24 h. low sodium diet, strict I&O charting, check daily weights. check fasting lipid panel, HbA1C to assess ASCVD risk. treat BP to target of 120/80. Start on enalapril 5mg PO q 24 h. If EF reduced, will need coronary angiogram to r/o CAD  -f/u CT coronaries    Thoracic aorta aneurysm 4.8 cm  -   HDL 40  - HbA1C 6.1  - pt is explained the findings, need for consistent outpatient monitoring, the risk of rupture from expansion, the need for repair if it expands beyond certain width  - syphilis screen(-)    Bilateral lower extremity edema  - LE duplex(-)    DVT ppx  CHG 4% daily and PRN  OOBTC  DASH/TLC, 1.5L fluid  FULL CODE 43 Yr M no PMH presenting with worsening SOB    Acute, new onset CHF exacerbation  - finding of pulmonary artery dilation suspected pulmonary artery hypertension  - tele-monitoring  - trend cardiac enzymes  - TTE: EF 20-25%  - strict I+Os  - need pulmonary evaluation outpatient for sleep study and work up and management for pulmonary artery hypertension  - cardio: TTE to assess LVEF. c/w IV lasix 40 mg bid,  f/u Bun/Cr q24h, lytes q24 h. low sodium diet, strict I&O charting, check daily weights. check fasting lipid panel, HbA1C to assess ASCVD risk. treat BP to target of 120/80. Start on enalapril 5mg PO q 24 h. If EF reduced, will need coronary angiogram to r/o CAD  -f/u CT coronaries. May need corlanor for tomorrow to control HR.     Thoracic aorta aneurysm 4.8 cm  -   HDL 40  - HbA1C 6.1  - pt is explained the findings, need for consistent outpatient monitoring, the risk of rupture from expansion, the need for repair if it expands beyond certain width  - syphilis screen(-)    Bilateral lower extremity edema  - LE duplex(-)    DVT ppx  CHG 4% daily and PRN  OOBTC  DASH/TLC, 1.5L fluid  FULL CODE 43 Yr M no PMH presenting with worsening SOB    Acute, new onset CHF exacerbation  - finding of pulmonary artery dilation suspected pulmonary artery hypertension  - tele-monitoring  - trend cardiac enzymes  - TTE: EF 20-25%  - strict I+Os  - need pulmonary evaluation outpatient for sleep study and work up and management for pulmonary artery hypertension  - cardio: TTE to assess LVEF. c/w IV lasix 40 mg bid,  f/u Bun/Cr q24h, lytes q24 h. low sodium diet, strict I&O charting, check daily weights. check fasting lipid panel, HbA1C to assess ASCVD risk. treat BP to target of 120/80. Start on enalapril 5mg PO q 24 h. If EF reduced, will need coronary angiogram to r/o CAD  -will need coronary angiogram as EF low    Thoracic aorta aneurysm 4.8 cm  -   HDL 40  - HbA1C 6.1  - pt is explained the findings, need for consistent outpatient monitoring, the risk of rupture from expansion, the need for repair if it expands beyond certain width  - syphilis screen(-)    Bilateral lower extremity edema  - LE duplex(-)    DVT ppx  CHG 4% daily and PRN  OOBTC  DASH/TLC, 1.5L fluid  FULL CODE

## 2018-12-21 LAB
ANION GAP SERPL CALC-SCNC: 16 MMOL/L — HIGH (ref 7–14)
BASOPHILS # BLD AUTO: 0.08 K/UL — SIGNIFICANT CHANGE UP (ref 0–0.2)
BASOPHILS NFR BLD AUTO: 0.9 % — SIGNIFICANT CHANGE UP (ref 0–1)
BUN SERPL-MCNC: 23 MG/DL — HIGH (ref 10–20)
CALCIUM SERPL-MCNC: 9.7 MG/DL — SIGNIFICANT CHANGE UP (ref 8.5–10.1)
CHLORIDE SERPL-SCNC: 97 MMOL/L — LOW (ref 98–110)
CO2 SERPL-SCNC: 28 MMOL/L — SIGNIFICANT CHANGE UP (ref 17–32)
CREAT SERPL-MCNC: 1.2 MG/DL — SIGNIFICANT CHANGE UP (ref 0.7–1.5)
EOSINOPHIL # BLD AUTO: 0.24 K/UL — SIGNIFICANT CHANGE UP (ref 0–0.7)
EOSINOPHIL NFR BLD AUTO: 2.6 % — SIGNIFICANT CHANGE UP (ref 0–8)
GLUCOSE SERPL-MCNC: 102 MG/DL — HIGH (ref 70–99)
HCT VFR BLD CALC: 48.8 % — SIGNIFICANT CHANGE UP (ref 42–52)
HGB BLD-MCNC: 15.9 G/DL — SIGNIFICANT CHANGE UP (ref 14–18)
IMM GRANULOCYTES NFR BLD AUTO: 0.4 % — HIGH (ref 0.1–0.3)
LYMPHOCYTES # BLD AUTO: 3.37 K/UL — SIGNIFICANT CHANGE UP (ref 1.2–3.4)
LYMPHOCYTES # BLD AUTO: 36.6 % — SIGNIFICANT CHANGE UP (ref 20.5–51.1)
MAGNESIUM SERPL-MCNC: 2.1 MG/DL — SIGNIFICANT CHANGE UP (ref 1.8–2.4)
MCHC RBC-ENTMCNC: 27.5 PG — SIGNIFICANT CHANGE UP (ref 27–31)
MCHC RBC-ENTMCNC: 32.6 G/DL — SIGNIFICANT CHANGE UP (ref 32–37)
MCV RBC AUTO: 84.3 FL — SIGNIFICANT CHANGE UP (ref 80–94)
MONOCYTES # BLD AUTO: 0.95 K/UL — HIGH (ref 0.1–0.6)
MONOCYTES NFR BLD AUTO: 10.3 % — HIGH (ref 1.7–9.3)
NEUTROPHILS # BLD AUTO: 4.54 K/UL — SIGNIFICANT CHANGE UP (ref 1.4–6.5)
NEUTROPHILS NFR BLD AUTO: 49.2 % — SIGNIFICANT CHANGE UP (ref 42.2–75.2)
NRBC # BLD: 0 /100 WBCS — SIGNIFICANT CHANGE UP (ref 0–0)
PLATELET # BLD AUTO: 237 K/UL — SIGNIFICANT CHANGE UP (ref 130–400)
POTASSIUM SERPL-MCNC: 4.3 MMOL/L — SIGNIFICANT CHANGE UP (ref 3.5–5)
POTASSIUM SERPL-SCNC: 4.3 MMOL/L — SIGNIFICANT CHANGE UP (ref 3.5–5)
RBC # BLD: 5.79 M/UL — SIGNIFICANT CHANGE UP (ref 4.7–6.1)
RBC # FLD: 14.2 % — SIGNIFICANT CHANGE UP (ref 11.5–14.5)
SODIUM SERPL-SCNC: 141 MMOL/L — SIGNIFICANT CHANGE UP (ref 135–146)
WBC # BLD: 9.22 K/UL — SIGNIFICANT CHANGE UP (ref 4.8–10.8)
WBC # FLD AUTO: 9.22 K/UL — SIGNIFICANT CHANGE UP (ref 4.8–10.8)

## 2018-12-21 RX ORDER — SODIUM CHLORIDE 9 MG/ML
1000 INJECTION INTRAMUSCULAR; INTRAVENOUS; SUBCUTANEOUS
Qty: 0 | Refills: 0 | Status: COMPLETED | OUTPATIENT
Start: 2018-12-21 | End: 2018-12-21

## 2018-12-21 RX ADMIN — Medication 81 MILLIGRAM(S): at 11:11

## 2018-12-21 RX ADMIN — CHLORHEXIDINE GLUCONATE 1 APPLICATION(S): 213 SOLUTION TOPICAL at 09:15

## 2018-12-21 RX ADMIN — ENOXAPARIN SODIUM 40 MILLIGRAM(S): 100 INJECTION SUBCUTANEOUS at 06:35

## 2018-12-21 RX ADMIN — ATORVASTATIN CALCIUM 40 MILLIGRAM(S): 80 TABLET, FILM COATED ORAL at 22:03

## 2018-12-21 RX ADMIN — LISINOPRIL 5 MILLIGRAM(S): 2.5 TABLET ORAL at 06:41

## 2018-12-21 RX ADMIN — SODIUM CHLORIDE 50 MILLILITER(S): 9 INJECTION INTRAMUSCULAR; INTRAVENOUS; SUBCUTANEOUS at 22:03

## 2018-12-21 RX ADMIN — Medication 40 MILLIGRAM(S): at 06:41

## 2018-12-21 NOTE — PROGRESS NOTE ADULT - SUBJECTIVE AND OBJECTIVE BOX
SUBJECTIVE:    Patient is a 43y old Male who presents with a chief complaint of Shortness of breath (20 Dec 2018 10:56)    Currently admitted to medicine with the primary diagnosis of CHF (congestive heart failure)     Today is hospital day 3d. No o/n events. Pt is NPO for cath today.     PAST MEDICAL & SURGICAL HISTORY  No pertinent past medical history  No significant past surgical history    SOCIAL HISTORY:  Negative for smoking/alcohol/drug use.     ALLERGIES:  No Known Allergies    MEDICATIONS:  STANDING MEDICATIONS  aspirin enteric coated 81 milliGRAM(s) Oral daily  atorvastatin 40 milliGRAM(s) Oral at bedtime  chlorhexidine 4% Liquid 1 Application(s) Topical <User Schedule>  enoxaparin Injectable 40 milliGRAM(s) SubCutaneous every 24 hours  furosemide   Injectable 40 milliGRAM(s) IV Push two times a day  influenza   Vaccine 0.5 milliLiter(s) IntraMuscular once  lisinopril 5 milliGRAM(s) Oral daily    PRN MEDICATIONS    VITALS:   T(F): 98  HR: 82  BP: 109/62  RR: 20  SpO2: 98%    LABS:                        15.8   8.57  )-----------( 238      ( 20 Dec 2018 07:35 )             48.5     12-20    141  |  98  |  21<H>  ----------------------------<  164<H>  4.1   |  28  |  1.0    Ca    9.5      20 Dec 2018 07:35  Mg     2.0     12-20                    RADIOLOGY:      EXAM:  DUPLEX SCAN EXT VEINS LOWER BI            PROCEDURE DATE:  12/19/2018            INTERPRETATION:  Clinical History / Reason for exam: The patient is a   43-year-old male with leg swelling. A venous duplex examination was   performed to evaluate the patient for deep venous thrombosis of the lower   extremities.    The bilateral common femoral, greater saphenous, superficial femoral,   popliteal and lesser saphenous veins were visualized with no evidence of   deep venous thrombosis    All veins were fully compressible.  There was presence of spontaneous   flow, augmentation with distal compression and phasicity.    The anterior tibial veins were  patent  the posterior tibial veins were   patent  and peroneal veins were patent        Impression:    No evidence of deep venous thrombosis in the bilateral lower extremities.    ICD-10: M79.89      FLY COLE M.D., ATTENDING VASCULAR  This document has been electronically signed. Dec 20 2018  8:53AM          EXAM:  CT ANGIO CHEST (W)AW IC            PROCEDURE DATE:  12/18/2018    INTERPRETATION:  CLINICAL HISTORY/REASON FOR EXAM: Chest pain.    TECHNIQUE: Multislice helical sections were obtained from the thoracic   inlet to the lung bases during rapid administration of intravenous   contrast. Thin sections were reconstructed through the pulmonary   vasculature.    COMPARISON: None.      FINDINGS:    PULMONARY EMBOLUS: No evidence of acute pulmonary emboli.    LUNGS, PLEURA, AIRWAYS: Small bilateral pleural effusions, interstitial   and pulmonary edema. No pneumothorax. Evaluation for pulmonary nodules   limited by background edema.    THORACIC NODES: No mediastinal, hilar, supraclavicular, or axillary   lymphadenopathy.    MEDIASTINUM/GREAT VESSELS: No pericardial effusion. Heart size is within   normal limits. Aneurysmal dilation of ascending thoracic aorta, 4.8 cm.   Dilation of main pulmonary artery, 4 cm, suggestive of pulmonary arterial   hypertension.    BONES/SOFT TISSUES: Unremarkable.    VISUALIZED UPPER ABDOMEN: Unremarkable    IMPRESSION:  1. No evidence of acute pulmonary embolism.    2. Small bilateral pleural effusions, interstitial and pulmonary edema.   Findings suggestive of CHF in the appropriate clinical setting.    3. Aneurysmal dilation of ascending thoracic aorta, 4.8 cm.     4. Dilation of main pulmonary artery, 4 cm, suggestive of pulmonary   arterial hypertension.    RASHID DAVIDSON M.D., ATTENDING RADIOLOGIST  This document has been electronically signed. Dec 18 2018 12:09PM      PHYSICAL EXAM:  GEN: No acute distress; morbidly obese  LUNGS: Clear to auscultation bilaterally   HEART: S1/S2 present. RRR.   ABD: Soft, non-tender, non-distended. Bowel sounds present  EXT: Skin tact  NEURO: AAOX3

## 2018-12-21 NOTE — PROGRESS NOTE ADULT - ASSESSMENT
# Acute, new onset CHF exacerbation  - finding of pulmonary artery dilation suspected pulmonary artery hypertension  - tele-monitoring  - cardiac enzymes are negative  - 2D Echo< from: Transthoracic Echocardiogram (12.19.18 @ 13:06) >   1. Left ventricular ejection fraction, by visual estimation, is 20 to   25%.   2. Moderately enlarged left atrium.   3. Mild tricuspid regurgitation.   4. Mild aortic regurgitation.   5. Trace pulmonic valve regurgitation.   6. Dilatation of the aortic root.    < end of copied text >    -  IV lasix 40 mg  not needed now --seems to be dry now  - need pulmonary evaluation outpatient for sleep study and work up and management for pulmonary artery hypertension  --will need angiogram to r/o ischemic heart disease-- cardiac cath today  will start B blockers--today     # Thoracic aorta aneurysm 4.8 cm  - medical and risk factors optimization with statin and BP control with beta-blocker, ARB/ACE-inhibitor, SBP goal <120 , currently started on lisinopril 5 mg, may add on beta-blocker if BP tolerates  - - pt is explained the findings, need for consistent outpatient monitoring, the risk of rupture from expansion, the need for repair if it expands beyond certain width  - syphilis screen was negative    # Bilateral lower extremity edema  -  venous duplex-- no DVT

## 2018-12-21 NOTE — CHART NOTE - NSCHARTNOTEFT_GEN_A_CORE
PRE-OP DIAGNOSIS: Cardiomyopathy, CHF, r/o CAD    PROCEDURE: [facility]    Physician: Dr. Hidalgo   Assistant: Dr. Ramsey    ANESTHESIA TYPE:  [  ]General Anesthesia  [ x ] Sedation  [ x ] Local/Regional    ESTIMATED BLOOD LOSS:   < 10 mL    CONDITION  [  ] Critical  [  ] Serious  [  ]Fair  [ x ]Good    ACCESS  [ x ] Right radial -> D stat  [  ] Right femoral       FINDINGS                             Dominance- right    Left main- normal     LAD: normal                        Diag: normal     Left Circumflex: normal   OM: normal     Right Coronary Artery: normal  RPDA- normal   RPL- normal       INTERVENTION/ IMPLANTS: none       POST-OP DIAGNOSIS    Normal Coronaries   Dilated aortic root  Non ischemic cardiomyopathy      PLAN OF CARE  [ ] D/C Home today  [ ] D/C in AM  [ x] Return to In-patient bed  [ ] Admit for observation  [ ] Return for staged procedure  [ ] CT Surgery consult called  [ x] medical management of heart failure PRE-OP DIAGNOSIS: Cardiomyopathy, CHF, r/o CAD    PROCEDURE: [facility]    Physician: Dr. Hidalgo   Assistant: Dr. Ramsey    ANESTHESIA TYPE:  [  ]General Anesthesia  [ x ] Sedation  [ x ] Local/Regional    ESTIMATED BLOOD LOSS:   < 10 mL    CONDITION  [  ] Critical  [  ] Serious  [  ]Fair  [ x ]Good    ACCESS  [ x ] Right radial -> D stat  [  ] Right femoral       FINDINGS                             Dominance- right    Left main- normal     LAD: normal                        Diag: normal     Left Circumflex: normal   OM: normal     Right Coronary Artery: normal  RPDA- normal   RPL- normal       INTERVENTION/ IMPLANTS: none       POST-OP DIAGNOSIS    Normal Coronaries   Dilated aortic root  Non ischemic cardiomyopathy      PLAN OF CARE  [ ] D/C Home today  [ ] D/C in AM  [ x] Return to In-patient bed  [ ] Admit for observation  [ ] Return for staged procedure  [ ] CT Surgery consult called  [ x] Continue medical management of heart failure PRE-OP DIAGNOSIS:  CHF, Undefined Cardiomyopathy    POST-OP DIAGNOSIS:  CHF, Nonischemic Cardiomyopathy    PROCEDURE:  Coronar Angiogram, Aortography    PHYSICIAN: Abelardo Hidalgo MD  ASSISTANT: Fellow    ANESTHESIA TYPE:  [ X] Sedation  [ X] Local/Regional  [   ]General Anesthesia    ESTIMATED BLOOD LOSS: < 10 mL    IV CONTRAST: 120 mL    COMPLICATIONS: None    POST-OP CONDITION:  [X ] Good  [   ] Fair  [   ] Serious  [   ] Critical    ACCESS:  Right RA --> D-Stat    FINDINGS:  Dominance: Right    INTERVENTION: None    IMPLANTS (if applicable): None    SPECIMENS REMOVED (if applicable): None    IMPRESSION:  1. No angiographically significant CAD    PLAN:  1. Medical therapy and risk factor modification.

## 2018-12-21 NOTE — PROGRESS NOTE ADULT - ASSESSMENT
Assessment: 44 yo M no PMH presenting with worsening SOB.     #Acute, new onset CHF exacerbation - r/o ischemic cause  - c/w tele  - trend cardiac enzymes  - TTE: EF 20-25% , pulm artery htn  - strict I+Os  - need pulmonary evaluation outpatient for sleep study and work up and management for pulmonary artery hypertension  - cardio: cath today; f/u cardiology afterwards  - c/w Lasix 40 IV BID  - c/w ASA 81 qd, statin 40mg qhs      #Thoracic aorta aneurysm 4.8 cm  -   HDL 40  - HbA1C 6.1  - pt is explained the findings, need for consistent outpatient monitoring, the risk of rupture from expansion, the need for repair if it expands beyond certain width  - syphilis screen(-)  - c/w Lisinopril 5mg oral daily    #Bilateral lower extremity edema  - LE duplex(-)    DVT ppx - Lovenox  OOBTC  Diet: 1.5L fluid restriction, DASH  FULL CODE  Dispo: From home, fully functional Assessment: 42 yo M no PMH presenting with worsening SOB.     #Acute, new onset CHF exacerbation - r/o ischemic cause  - c/w tele  - trend cardiac enzymes  - TTE: EF 20-25% , pulm artery htn  - strict I+Os  - need pulmonary evaluation outpatient for sleep study and work up and management for pulmonary artery hypertension  - cardio: cath today; f/u cardiology afterwards  - d/c Lasix 40 IV BID - patient seems clinically dry now  - c/w ASA 81 qd, statin 40mg qhs      #Thoracic aorta aneurysm 4.8 cm  -   HDL 40  - HbA1C 6.1  - pt is explained the findings, need for consistent outpatient monitoring, the risk of rupture from expansion, the need for repair if it expands beyond certain width  - syphilis screen(-)  - c/w Lisinopril 5mg oral daily    #Bilateral lower extremity edema  - LE duplex(-)    DVT ppx - Lovenox  OOBTC  Diet: 1.5L fluid restriction, DASH  FULL CODE  Dispo: From home, fully functional

## 2018-12-21 NOTE — PROGRESS NOTE ADULT - SUBJECTIVE AND OBJECTIVE BOX
PREOPERATIVE DAY OF PROCEDURE EVALUATION:  I have personally seen and examined the patient, and reviewed the history and physical.  Clinically stable.  New onset CHF wit undefined cardiomyopathy.  Proceed with cardiac catheterization (Signed electronically by Abelardo Hdialgo).  12-21-18 @ 15:07

## 2018-12-21 NOTE — PROGRESS NOTE ADULT - SUBJECTIVE AND OBJECTIVE BOX
SUBJECTIVE:    Patient is a 43y old Male who presents with a chief complaint of Shortness of breath (21 Dec 2018 10:27)    Currently admitted to medicine with the primary diagnosis of CHF (congestive heart failure)     Today is hospital day 3d.     PAST MEDICAL & SURGICAL HISTORY  No pertinent past medical history  No significant past surgical history    ALLERGIES:  No Known Allergies    MEDICATIONS:  STANDING MEDICATIONS  aspirin enteric coated 81 milliGRAM(s) Oral daily  atorvastatin 40 milliGRAM(s) Oral at bedtime  chlorhexidine 4% Liquid 1 Application(s) Topical <User Schedule>  enoxaparin Injectable 40 milliGRAM(s) SubCutaneous every 24 hours  furosemide   Injectable 40 milliGRAM(s) IV Push two times a day  influenza   Vaccine 0.5 milliLiter(s) IntraMuscular once  lisinopril 5 milliGRAM(s) Oral daily    PRN MEDICATIONS    VITALS:   T(F): 98  HR: 82  BP: 109/62  RR: 20  SpO2: 98%    LABS:                        15.9   9.22  )-----------( 237      ( 21 Dec 2018 10:15 )             48.8     12-21    141  |  97<L>  |  23<H>  ----------------------------<  102<H>  4.3   |  28  |  1.2    Ca    9.7      21 Dec 2018 10:15  Mg     2.1     12-21                    RADIOLOGY:    PHYSICAL EXAM:  GEN: No acute distress  LUNGS: Clear to auscultation bilaterally   HEART: S1/S2 present. RRR.   ABD/ GI: Soft, non-tender, non-distended. Bowel sounds present  EXT: NC/NC/NE/2+PP/NASH  NEURO: AAOX3

## 2018-12-22 LAB
ALBUMIN SERPL ELPH-MCNC: 4.1 G/DL — SIGNIFICANT CHANGE UP (ref 3.5–5.2)
ALP SERPL-CCNC: 81 U/L — SIGNIFICANT CHANGE UP (ref 30–115)
ALT FLD-CCNC: 23 U/L — SIGNIFICANT CHANGE UP (ref 0–41)
ANION GAP SERPL CALC-SCNC: 14 MMOL/L — SIGNIFICANT CHANGE UP (ref 7–14)
AST SERPL-CCNC: 17 U/L — SIGNIFICANT CHANGE UP (ref 0–41)
BASOPHILS # BLD AUTO: 0.06 K/UL — SIGNIFICANT CHANGE UP (ref 0–0.2)
BASOPHILS NFR BLD AUTO: 0.6 % — SIGNIFICANT CHANGE UP (ref 0–1)
BILIRUB SERPL-MCNC: 0.9 MG/DL — SIGNIFICANT CHANGE UP (ref 0.2–1.2)
BUN SERPL-MCNC: 24 MG/DL — HIGH (ref 10–20)
CALCIUM SERPL-MCNC: 9.3 MG/DL — SIGNIFICANT CHANGE UP (ref 8.5–10.1)
CHLORIDE SERPL-SCNC: 97 MMOL/L — LOW (ref 98–110)
CO2 SERPL-SCNC: 27 MMOL/L — SIGNIFICANT CHANGE UP (ref 17–32)
CREAT SERPL-MCNC: 1.1 MG/DL — SIGNIFICANT CHANGE UP (ref 0.7–1.5)
EOSINOPHIL # BLD AUTO: 0.23 K/UL — SIGNIFICANT CHANGE UP (ref 0–0.7)
EOSINOPHIL NFR BLD AUTO: 2.4 % — SIGNIFICANT CHANGE UP (ref 0–8)
GLUCOSE SERPL-MCNC: 100 MG/DL — HIGH (ref 70–99)
HCT VFR BLD CALC: 47.2 % — SIGNIFICANT CHANGE UP (ref 42–52)
HGB BLD-MCNC: 15 G/DL — SIGNIFICANT CHANGE UP (ref 14–18)
IMM GRANULOCYTES NFR BLD AUTO: 0.3 % — SIGNIFICANT CHANGE UP (ref 0.1–0.3)
LYMPHOCYTES # BLD AUTO: 2.95 K/UL — SIGNIFICANT CHANGE UP (ref 1.2–3.4)
LYMPHOCYTES # BLD AUTO: 31.1 % — SIGNIFICANT CHANGE UP (ref 20.5–51.1)
MCHC RBC-ENTMCNC: 27.1 PG — SIGNIFICANT CHANGE UP (ref 27–31)
MCHC RBC-ENTMCNC: 31.8 G/DL — LOW (ref 32–37)
MCV RBC AUTO: 85.4 FL — SIGNIFICANT CHANGE UP (ref 80–94)
MONOCYTES # BLD AUTO: 1.22 K/UL — HIGH (ref 0.1–0.6)
MONOCYTES NFR BLD AUTO: 12.9 % — HIGH (ref 1.7–9.3)
NEUTROPHILS # BLD AUTO: 4.99 K/UL — SIGNIFICANT CHANGE UP (ref 1.4–6.5)
NEUTROPHILS NFR BLD AUTO: 52.7 % — SIGNIFICANT CHANGE UP (ref 42.2–75.2)
NRBC # BLD: 0 /100 WBCS — SIGNIFICANT CHANGE UP (ref 0–0)
PLATELET # BLD AUTO: 215 K/UL — SIGNIFICANT CHANGE UP (ref 130–400)
POTASSIUM SERPL-MCNC: 4.4 MMOL/L — SIGNIFICANT CHANGE UP (ref 3.5–5)
POTASSIUM SERPL-SCNC: 4.4 MMOL/L — SIGNIFICANT CHANGE UP (ref 3.5–5)
PROT SERPL-MCNC: 7 G/DL — SIGNIFICANT CHANGE UP (ref 6–8)
RBC # BLD: 5.53 M/UL — SIGNIFICANT CHANGE UP (ref 4.7–6.1)
RBC # FLD: 14 % — SIGNIFICANT CHANGE UP (ref 11.5–14.5)
SODIUM SERPL-SCNC: 138 MMOL/L — SIGNIFICANT CHANGE UP (ref 135–146)
WBC # BLD: 9.48 K/UL — SIGNIFICANT CHANGE UP (ref 4.8–10.8)
WBC # FLD AUTO: 9.48 K/UL — SIGNIFICANT CHANGE UP (ref 4.8–10.8)

## 2018-12-22 PROCEDURE — 93926 LOWER EXTREMITY STUDY: CPT | Mod: 26,LT

## 2018-12-22 RX ORDER — CARVEDILOL PHOSPHATE 80 MG/1
6.25 CAPSULE, EXTENDED RELEASE ORAL EVERY 12 HOURS
Qty: 0 | Refills: 0 | Status: DISCONTINUED | OUTPATIENT
Start: 2018-12-22 | End: 2018-12-24

## 2018-12-22 RX ADMIN — Medication 81 MILLIGRAM(S): at 11:16

## 2018-12-22 RX ADMIN — LISINOPRIL 5 MILLIGRAM(S): 2.5 TABLET ORAL at 05:06

## 2018-12-22 RX ADMIN — CARVEDILOL PHOSPHATE 6.25 MILLIGRAM(S): 80 CAPSULE, EXTENDED RELEASE ORAL at 17:31

## 2018-12-22 RX ADMIN — ENOXAPARIN SODIUM 40 MILLIGRAM(S): 100 INJECTION SUBCUTANEOUS at 05:14

## 2018-12-22 RX ADMIN — ATORVASTATIN CALCIUM 40 MILLIGRAM(S): 80 TABLET, FILM COATED ORAL at 21:49

## 2018-12-22 NOTE — PROGRESS NOTE ADULT - SUBJECTIVE AND OBJECTIVE BOX
SUBJECTIVE:    Patient is a 43y old Male who presents with a chief complaint of Shortness of breath (21 Dec 2018 15:06)    Currently admitted to medicine with the primary diagnosis of CHF (congestive heart failure)     Today is hospital day 4d.     PAST MEDICAL & SURGICAL HISTORY  No pertinent past medical history  No significant past surgical history    ALLERGIES:  No Known Allergies    MEDICATIONS:  STANDING MEDICATIONS  aspirin enteric coated 81 milliGRAM(s) Oral daily  atorvastatin 40 milliGRAM(s) Oral at bedtime  chlorhexidine 4% Liquid 1 Application(s) Topical <User Schedule>  enoxaparin Injectable 40 milliGRAM(s) SubCutaneous every 24 hours  influenza   Vaccine 0.5 milliLiter(s) IntraMuscular once  lisinopril 5 milliGRAM(s) Oral daily    PRN MEDICATIONS    VITALS:   T(F): 97.8  HR: 83  BP: 122/76  RR: 20  SpO2: 98%    LABS:                        15.0   9.48  )-----------( 215      ( 22 Dec 2018 07:37 )             47.2     12-22    138  |  97<L>  |  24<H>  ----------------------------<  100<H>  4.4   |  27  |  1.1    Ca    9.3      22 Dec 2018 07:37  Mg     2.1     12-21    TPro  7.0  /  Alb  4.1  /  TBili  0.9  /  DBili  x   /  AST  17  /  ALT  23  /  AlkPhos  81  12-22                  RADIOLOGY:    PHYSICAL EXAM:  GEN: No acute distress  LUNGS: Clear to auscultation bilaterally   HEART: S1/S2 present. RRR.   ABD/ GI: Soft, non-tender, non-distended. Bowel sounds present  EXT: NC/NC/NE/2+PP/NASH  NEURO: AAOX3

## 2018-12-22 NOTE — PROGRESS NOTE ADULT - ASSESSMENT
Assessment: 42 yo M no PMH presenting with worsening SOB.     #Acute, new onset CHF exacerbation - r/o ischemic cause  - c/w tele  - trend cardiac enzymes  - TTE: EF 20-25% , pulm artery htn  - strict I+Os  - need pulmonary evaluation outpatient for sleep study and work up and management for pulmonary artery hypertension  - cath was negative for significant CAD; c/w medical management  - d/c Lasix 40 IV BID - patient seems clinically dry now  - c/w ASA 81 qd, statin 40mg qhs      #Thoracic aorta aneurysm 4.8 cm  -   HDL 40  - HbA1C 6.1  - pt is explained the findings, need for consistent outpatient monitoring, the risk of rupture from expansion, the need for repair if it expands beyond certain width  - syphilis screen(-)  - c/w Lisinopril 5mg oral daily    #Bilateral lower extremity edema  - LE duplex(-)    DVT ppx - Lovenox  OOBTC  Diet: 1.5L fluid restriction, DASH  FULL CODE  Dispo: From home, fully functional

## 2018-12-22 NOTE — PROGRESS NOTE ADULT - ASSESSMENT
# Acute, new onset CHF exacerbation  - finding of pulmonary artery dilation suspected pulmonary artery hypertension  - tele-monitoring  - cardiac enzymes are negative  - 2D Echo< from: Transthoracic Echocardiogram (12.19.18 @ 13:06) >   1. Left ventricular ejection fraction, by visual estimation, is 20 to   25%.   2. Moderately enlarged left atrium.   3. Mild tricuspid regurgitation.   4. Mild aortic regurgitation.   5. Trace pulmonic valve regurgitation.   6. Dilatation of the aortic root.    < end of copied text >    -  IV lasix 40 mg  not needed now --seems to be dry now  - need pulmonary evaluation outpatient for sleep study and work up and management for pulmonary artery hypertension  -- Cardiac cath  was  done yesterday and he has nonobstructive coronary disease  - start low dose carvedilol  - EPS evaluation-- case was discussed with Dr Smith    # Thoracic aorta aneurysm 4.8 cm  - medical and risk factors optimization with statin and BP control with beta-blocker, ARB/ACE-inhibitor, SBP goal <120 , currently started on lisinopril 5 mg, may add on beta-blocker if BP tolerates  - - pt is explained the findings, need for consistent outpatient monitoring, the risk of rupture from expansion, the need for repair if it expands beyond certain width  - syphilis screen was negative  - will get CT angiogram in AM( 48hrs after contrast study)    # Bilateral lower extremity edema  -  venous duplex-- no DVT    patient has no insurance

## 2018-12-22 NOTE — PROGRESS NOTE ADULT - SUBJECTIVE AND OBJECTIVE BOX
SUBJECTIVE:    Patient is a 43y old Male who presents with a chief complaint of Shortness of breath (22 Dec 2018 15:30)    Currently admitted to medicine with the primary diagnosis of CHF (congestive heart failure)     Today is hospital day 4d. Cath was negative for obstructive CAD. Pt c/o of numbness/tingling in RLE .    PAST MEDICAL & SURGICAL HISTORY  No pertinent past medical history  No significant past surgical history       ALLERGIES:  No Known Allergies    MEDICATIONS:  STANDING MEDICATIONS  aspirin enteric coated 81 milliGRAM(s) Oral daily  atorvastatin 40 milliGRAM(s) Oral at bedtime  carvedilol 6.25 milliGRAM(s) Oral every 12 hours  chlorhexidine 4% Liquid 1 Application(s) Topical <User Schedule>  enoxaparin Injectable 40 milliGRAM(s) SubCutaneous every 24 hours  influenza   Vaccine 0.5 milliLiter(s) IntraMuscular once  lisinopril 5 milliGRAM(s) Oral daily    PRN MEDICATIONS    VITALS:   T(F): 97.8  HR: 83  BP: 122/76  RR: 20  SpO2: 98%    LABS:                        15.0   9.48  )-----------( 215      ( 22 Dec 2018 07:37 )             47.2     12-22    138  |  97<L>  |  24<H>  ----------------------------<  100<H>  4.4   |  27  |  1.1    Ca    9.3      22 Dec 2018 07:37  Mg     2.1     12-21    TPro  7.0  /  Alb  4.1  /  TBili  0.9  /  DBili  x   /  AST  17  /  ALT  23  /  AlkPhos  81  12-22                  RADIOLOGY:    PHYSICAL EXAM:  GEN: No acute distress  LUNGS: Clear to auscultation bilaterally   HEART: S1/S2 present. RRR.   ABD: Soft, non-tender, non-distended. Bowel sounds present  EXT: skin intact  NEURO: AAOX3

## 2018-12-23 DIAGNOSIS — I50.9 HEART FAILURE, UNSPECIFIED: ICD-10-CM

## 2018-12-23 LAB
ANION GAP SERPL CALC-SCNC: 13 MMOL/L — SIGNIFICANT CHANGE UP (ref 7–14)
BUN SERPL-MCNC: 19 MG/DL — SIGNIFICANT CHANGE UP (ref 10–20)
CALCIUM SERPL-MCNC: 9.6 MG/DL — SIGNIFICANT CHANGE UP (ref 8.5–10.1)
CHLORIDE SERPL-SCNC: 100 MMOL/L — SIGNIFICANT CHANGE UP (ref 98–110)
CO2 SERPL-SCNC: 27 MMOL/L — SIGNIFICANT CHANGE UP (ref 17–32)
CREAT SERPL-MCNC: 1.1 MG/DL — SIGNIFICANT CHANGE UP (ref 0.7–1.5)
GLUCOSE SERPL-MCNC: 125 MG/DL — HIGH (ref 70–99)
HCT VFR BLD CALC: 47.8 % — SIGNIFICANT CHANGE UP (ref 42–52)
HGB BLD-MCNC: 15.1 G/DL — SIGNIFICANT CHANGE UP (ref 14–18)
MCHC RBC-ENTMCNC: 27 PG — SIGNIFICANT CHANGE UP (ref 27–31)
MCHC RBC-ENTMCNC: 31.6 G/DL — LOW (ref 32–37)
MCV RBC AUTO: 85.4 FL — SIGNIFICANT CHANGE UP (ref 80–94)
NRBC # BLD: 0 /100 WBCS — SIGNIFICANT CHANGE UP (ref 0–0)
PLATELET # BLD AUTO: 229 K/UL — SIGNIFICANT CHANGE UP (ref 130–400)
POTASSIUM SERPL-MCNC: 4.8 MMOL/L — SIGNIFICANT CHANGE UP (ref 3.5–5)
POTASSIUM SERPL-SCNC: 4.8 MMOL/L — SIGNIFICANT CHANGE UP (ref 3.5–5)
RBC # BLD: 5.6 M/UL — SIGNIFICANT CHANGE UP (ref 4.7–6.1)
RBC # FLD: 13.9 % — SIGNIFICANT CHANGE UP (ref 11.5–14.5)
SODIUM SERPL-SCNC: 140 MMOL/L — SIGNIFICANT CHANGE UP (ref 135–146)
WBC # BLD: 7.86 K/UL — SIGNIFICANT CHANGE UP (ref 4.8–10.8)
WBC # FLD AUTO: 7.86 K/UL — SIGNIFICANT CHANGE UP (ref 4.8–10.8)

## 2018-12-23 RX ORDER — ATORVASTATIN CALCIUM 80 MG/1
10 TABLET, FILM COATED ORAL AT BEDTIME
Qty: 0 | Refills: 0 | Status: DISCONTINUED | OUTPATIENT
Start: 2018-12-23 | End: 2018-12-24

## 2018-12-23 RX ADMIN — CARVEDILOL PHOSPHATE 6.25 MILLIGRAM(S): 80 CAPSULE, EXTENDED RELEASE ORAL at 17:20

## 2018-12-23 RX ADMIN — CARVEDILOL PHOSPHATE 6.25 MILLIGRAM(S): 80 CAPSULE, EXTENDED RELEASE ORAL at 06:26

## 2018-12-23 RX ADMIN — LISINOPRIL 5 MILLIGRAM(S): 2.5 TABLET ORAL at 06:26

## 2018-12-23 RX ADMIN — ATORVASTATIN CALCIUM 10 MILLIGRAM(S): 80 TABLET, FILM COATED ORAL at 21:16

## 2018-12-23 RX ADMIN — ENOXAPARIN SODIUM 40 MILLIGRAM(S): 100 INJECTION SUBCUTANEOUS at 06:27

## 2018-12-23 NOTE — CONSULT NOTE ADULT - SUBJECTIVE AND OBJECTIVE BOX
Patient is a 43y old  Male who presents with a chief complaint of Shortness of breath (23 Dec 2018 09:21)        PAST MEDICAL & SURGICAL HISTORY:  No pertinent past medical history  No significant past surgical history      HPI:      43 Yr M no PMH presenting with worsening SOB. He reports SOB, nonproductive cough, dyspnea on exertion, and 3 pillow orthopnea. Pt reports symptoms started 2 week ago and has been progressively getting worse. Pt also reports LLE swelling. He denies chest pain, dizziness, fever, chills, hemoptysis, abd pain, nausea, vomiting or diarrhea. He denies any sick contact, but reports long flight from [place] 3 days ago.    Patient underwent cardiac cath which showed no blockages and patient has been diagnosed with new onset nonischemic cardiomyopathy.           PREVIOUS DIAGNOSTIC TESTING:      ECHO  FINDINGS:    < from: Transthoracic Echocardiogram (12.19.18 @ 13:06) >  Summary:   1. Left ventricular ejection fraction, by visual estimation, is 20 to   25%.   2. Moderately enlarged left atrium.   3. Mild tricuspid regurgitation.   4. Mild aortic regurgitation.   5. Trace pulmonic valve regurgitation.   6. Dilatation of the aortic root.    < end of copied text >      CATHETERIZATION  FINDINGS:    IMPRESSION:  1. No angiographically significant CAD      VENOUS DUPLEX SCAN:  FINDINGS:    < from: VA Duplex Low Ext Arterial, Ltd, Right (12.22.18 @ 21:33) >  Impression:    Normal arterial flow in the right lower extremity.    < end of copied text >      CHEST CT PULMONARY ANGIO with IV Contrast:  FINDINGS:    < from: CT Angio Chest w/ IV Cont (12.18.18 @ 11:58) >  IMPRESSION:  1. No evidence of acute pulmonary embolism.    2. Small bilateral pleural effusions, interstitial and pulmonary edema.   Findings suggestive of CHF in the appropriate clinical setting.    3. Aneurysmal dilation of ascending thoracic aorta, 4.8 cm.     4. Dilation of main pulmonary artery, 4 cm, suggestive of pulmonary   arterial hypertension.    < end of copied text >      MEDICATIONS  (STANDING):  atorvastatin 10 milliGRAM(s) Oral at bedtime  carvedilol 6.25 milliGRAM(s) Oral every 12 hours  chlorhexidine 4% Liquid 1 Application(s) Topical <User Schedule>  enoxaparin Injectable 40 milliGRAM(s) SubCutaneous every 24 hours  influenza   Vaccine 0.5 milliLiter(s) IntraMuscular once  lisinopril 5 milliGRAM(s) Oral daily    MEDICATIONS  (PRN):      FAMILY HISTORY:  Family history of heart disease (Father)  Family history of diabetes mellitus (Mother)      SOCIAL HISTORY:    Denies Smoking, ETOH or illicit drug use. Denies any risk factor for sexually transmitted disease or history of syphilis    REVIEW OF SYSTEMS:    See HPI, otherwise negative      Vital Signs Last 24 Hrs  T(C): 36.6 (23 Dec 2018 06:28), Max: 36.6 (22 Dec 2018 14:25)  T(F): 97.8 (23 Dec 2018 06:28), Max: 97.8 (22 Dec 2018 14:25)  HR: 79 (23 Dec 2018 06:28) (79 - 83)  BP: 101/63 (23 Dec 2018 06:28) (101/63 - 122/76)  BP(mean): 94 (22 Dec 2018 14:25) (94 - 94)  RR: 20 (23 Dec 2018 06:28) (20 - 20)  SpO2: 96% (23 Dec 2018 06:28) (96% - 98%)    PHYSICAL EXAM:    GEN: No acute distress, heavily built  LUNGS: Clear to auscultation bilaterally   HEART: S1/S2 present. RRR.   ABD/ GI: Soft, non-tender, non-distended. Bowel sounds present  EXT: NC/NC/NE/2+PP/NASH  NEURO: AAOX3        INTERPRETATION OF TELEMETRY:    ECG:    I&O's Detail      LABS:                        15.1   7.86  )-----------( 229      ( 23 Dec 2018 09:17 )             47.8     12-23    140  |  100  |  19  ----------------------------<  125<H>  4.8   |  27  |  1.1    Ca    9.6      23 Dec 2018 09:17    TPro  7.0  /  Alb  4.1  /  TBili  0.9  /  DBili  x   /  AST  17  /  ALT  23  /  AlkPhos  81  12-22       Patient is a 43y old  Male who presents with a chief complaint of Shortness of breath (23 Dec 2018 09:21)    PAST MEDICAL & SURGICAL HISTORY:  No pertinent past medical history  No significant past surgical history      HPI: 43 Yr [place] M no PMH presenting with worsening SOB. He reports SOB, nonproductive cough, dyspnea on exertion, and 3 pillow orthopnea. Pt reports symptoms started 2 week ago and has been progressively getting worse. Pt also reports LLE swelling. He denies chest pain, dizziness, fever, chills, hemoptysis, abd pain, nausea, vomiting or diarrhea. He denies any sick contact, but reports long flight from [place] 3 days ago.    Patient underwent cardiac cath which showed no blockages and patient has been diagnosed with new onset nonischemic cardiomyopathy.       PREVIOUS DIAGNOSTIC TESTING:    Transthoracic Echocardiogram (12.19.18 @ 13:06)  Summary:   1. Left ventricular ejection fraction, by visual estimation, is 20 to 25%.   2. Moderately enlarged left atrium.   3. Mild tricuspid regurgitation.   4. Mild aortic regurgitation.   5. Trace pulmonic valve regurgitation.   6. Dilatation of the aortic root.    CATHETERIZATION   IMPRESSION:  1. No angiographically significant CAD      VENOUS DUPLEX SCAN:  < from: VA Duplex Low Ext Arterial, Ltd, Right (12.22.18 @ 21:33) >  Impression:  Normal arterial flow in the right lower extremity.  < end of copied text >    < from: CT Angio Chest w/ IV Cont (12.18.18 @ 11:58) >  IMPRESSION:  1. No evidence of acute pulmonary embolism.  2. Small bilateral pleural effusions, interstitial and pulmonary edema.   Findings suggestive of CHF in the appropriate clinical setting.  3. Aneurysmal dilation of ascending thoracic aorta, 4.8 cm.   4. Dilation of main pulmonary artery, 4 cm, suggestive of pulmonary   arterial hypertension.  < end of copied text >    MEDICATIONS  (STANDING):  atorvastatin 10 milliGRAM(s) Oral at bedtime  carvedilol 6.25 milliGRAM(s) Oral every 12 hours  chlorhexidine 4% Liquid 1 Application(s) Topical <User Schedule>  enoxaparin Injectable 40 milliGRAM(s) SubCutaneous every 24 hours  influenza   Vaccine 0.5 milliLiter(s) IntraMuscular once  lisinopril 5 milliGRAM(s) Oral daily    MEDICATIONS  (PRN):      FAMILY HISTORY:  Family history of heart disease (Father)  Family history of diabetes mellitus (Mother)      SOCIAL HISTORY:  Denies Smoking, ETOH or illicit drug use. Denies any risk factor for sexually transmitted disease or history of syphilis    REVIEW OF SYSTEMS:  See HPI, otherwise negative      Vital Signs Last 24 Hrs  T(C): 36.6 (23 Dec 2018 06:28), Max: 36.6 (22 Dec 2018 14:25)  T(F): 97.8 (23 Dec 2018 06:28), Max: 97.8 (22 Dec 2018 14:25)  HR: 79 (23 Dec 2018 06:28) (79 - 83)  BP: 101/63 (23 Dec 2018 06:28) (101/63 - 122/76)  BP(mean): 94 (22 Dec 2018 14:25) (94 - 94)  RR: 20 (23 Dec 2018 06:28) (20 - 20)  SpO2: 96% (23 Dec 2018 06:28) (96% - 98%)    PHYSICAL EXAM:  GEN: No acute distress, heavily built, overweight  LUNGS: Clear to auscultation bilaterally   HEART: S1/S2 present. RRR. No murmurs/rubs/gallops.  ABD/ GI: Soft, non-tender, non-distended. Bowel sounds present  EXT: NC/NC/NE/2+PP/NASH  NEURO: AAOX3      INTERPRETATION OF TELEMETRY:  ECG:    I&O's Detail      LABS:                        15.1   7.86  )-----------( 229      ( 23 Dec 2018 09:17 )             47.8     12-23    140  |  100  |  19  ----------------------------<  125<H>  4.8   |  27  |  1.1    Ca    9.6      23 Dec 2018 09:17    TPro  7.0  /  Alb  4.1  /  TBili  0.9  /  DBili  x   /  AST  17  /  ALT  23  /  AlkPhos  81  12-22

## 2018-12-23 NOTE — PROGRESS NOTE ADULT - SUBJECTIVE AND OBJECTIVE BOX
SUBJECTIVE:    Patient is a 43y old Male who presents with a chief complaint of Shortness of breath (23 Dec 2018 11:06)    Currently admitted to medicine with the primary diagnosis of CHF (congestive heart failure)     Today is hospital day 5d.     PAST MEDICAL & SURGICAL HISTORY  No pertinent past medical history  No significant past surgical history    ALLERGIES:  No Known Allergies    MEDICATIONS:  STANDING MEDICATIONS  atorvastatin 10 milliGRAM(s) Oral at bedtime  carvedilol 6.25 milliGRAM(s) Oral every 12 hours  chlorhexidine 4% Liquid 1 Application(s) Topical <User Schedule>  enoxaparin Injectable 40 milliGRAM(s) SubCutaneous every 24 hours  influenza   Vaccine 0.5 milliLiter(s) IntraMuscular once  lisinopril 5 milliGRAM(s) Oral daily    PRN MEDICATIONS    VITALS:   T(F): 97.8  HR: 79  BP: 101/63  RR: 20  SpO2: 96%    LABS:                        15.1   7.86  )-----------( 229      ( 23 Dec 2018 09:17 )             47.8     12-23    140  |  100  |  19  ----------------------------<  125<H>  4.8   |  27  |  1.1    Ca    9.6      23 Dec 2018 09:17    TPro  7.0  /  Alb  4.1  /  TBili  0.9  /  DBili  x   /  AST  17  /  ALT  23  /  AlkPhos  81  12-22                  RADIOLOGY:    PHYSICAL EXAM:  GEN: No acute distress  LUNGS: Clear to auscultation bilaterally   HEART: S1/S2 present. RRR.   ABD/ GI: Soft, non-tender, non-distended. Bowel sounds present  EXT: NC/NC/NE/2+PP/NASH  NEURO: AAOX3

## 2018-12-23 NOTE — PROGRESS NOTE ADULT - ASSESSMENT
# Acute, new onset CHF exacerbation  - finding of pulmonary artery dilation suspected pulmonary artery hypertension  - tele-monitoring  - cardiac enzymes are negative  - 2D Echo< from: Transthoracic Echocardiogram (12.19.18 @ 13:06) >   1. Left ventricular ejection fraction, by visual estimation, is 20 to   25%.   2. Moderately enlarged left atrium.   3. Mild tricuspid regurgitation.   4. Mild aortic regurgitation.   5. Trace pulmonic valve regurgitation.   6. Dilatation of the aortic root.    < end of copied text >    -  IV lasix 40 mg  not needed now --seems to be dry now  - need pulmonary evaluation outpatient for sleep study and work up and management for pulmonary artery hypertension  -- Cardiac cath  was  done yesterday and he has nonobstructive coronary disease  - start low dose carvedilol  - EPS evaluation-- case was discussed with Dr Smith    # Thoracic aorta aneurysm 4.8 cm  - medical and risk factors optimization with statin and BP control with beta-blocker, ARB/ACE-inhibitor, SBP goal <120 , currently started on lisinopril 5 mg, added coreg  - - pt is explained the findings, need for consistent outpatient monitoring, the risk of rupture from expansion, the need for repair if it expands beyond certain width  - syphilis screen was negative  - CT angiogram  not needed as was done at admission    # Bilateral lower extremity edema resolved  -  venous duplex-- no DVT    patient has no insurance  plans as per EPS eval.

## 2018-12-23 NOTE — CONSULT NOTE ADULT - ATTENDING COMMENTS
Patient seen and examined.    Tele: NSR/Sinus tach    42 yo M with no sig medical history presenting with 2 weeks of dyspnea on exertion, PND found to have new onset acute heart failure. Pt states he had the flu about 2 months ago. No fam history of sudden cardiac death. No history of syncope. Occasional palpitations, but no chest pain, presyncope, or other cardiovascular complaints.    ECHO with EF 20-25%. Cath with no significant disease.  TSH normal; trops negative.    Recommend  - [employer]. Paperwork filled out and rep notified.  - Social work consult for insurance.  - Continue BB and ACEi. Increase as BP allows.  - Outpatient follow up with me in 4-6 weeks. I explained to the patient that he may require an ICD, but it is possible that his cardiomyopathy may be secondary to his recent viral infection. Patient seen and examined.    Tele: NSR/Sinus tach    42 yo M with no sig medical history presenting with 2 weeks of dyspnea on exertion, PND found to have new onset acute heart failure. Pt states he had the flu about 2 months ago. No fam history of sudden cardiac death. No history of syncope. Occasional palpitations, but no chest pain, presyncope, or other cardiovascular complaints.    ECHO with EF 20-25%. Cath with no significant disease.  TSH normal; trops negative.    Recommend  - [employer]. Paperwork filled out and rep notified.  - Social work consult for insurance.  - Continue BB and ACEi. Increase as BP allows.  - Outpatient follow up with me in 4-6 weeks. I explained to the patient that he may require an ICD, but it is possible that his cardiomyopathy may be secondary to his recent viral infection.  - Cardiology follow up for thoracic aortic anuerysm

## 2018-12-23 NOTE — PROGRESS NOTE ADULT - SUBJECTIVE AND OBJECTIVE BOX
Subjective: pt has some bilateral leg numbness otherwise well with no cp nor sob      MEDICATIONS  (STANDING):  aspirin enteric coated 81 milliGRAM(s) Oral daily  atorvastatin 40 milliGRAM(s) Oral at bedtime  carvedilol 6.25 milliGRAM(s) Oral every 12 hours  chlorhexidine 4% Liquid 1 Application(s) Topical <User Schedule>  enoxaparin Injectable 40 milliGRAM(s) SubCutaneous every 24 hours  influenza   Vaccine 0.5 milliLiter(s) IntraMuscular once  lisinopril 5 milliGRAM(s) Oral daily    MEDICATIONS  (PRN):            Vital Signs Last 24 Hrs  T(C): 36.6 (23 Dec 2018 06:28), Max: 36.6 (22 Dec 2018 14:25)  T(F): 97.8 (23 Dec 2018 06:28), Max: 97.8 (22 Dec 2018 14:25)  HR: 79 (23 Dec 2018 06:28) (79 - 83)  BP: 101/63 (23 Dec 2018 06:28) (101/63 - 122/76)  BP(mean): 94 (22 Dec 2018 14:25) (94 - 94)  RR: 20 (23 Dec 2018 06:28) (20 - 20)  SpO2: 96% (23 Dec 2018 06:28) (96% - 98%)             REVIEW OF SYSTEMS:  CONSTITUTIONAL: no fever, no chills, no diaphoresis  CARDIOLOGY: no chest pain, no SOB, no palpitation, no diaphoresis, no faint   RESPIRATORY: no dyspnea, no wheeze, no orthopnea, no PND   NEUROLOGICAL: no dizziness, headache, focal deficits to report., bilateral leg numbness  GI: no abdominal pain, no dyspepsia, no nausea, no vomiting, no diarrhea.    HEENT: no congestion, no nasal bleeding  SKIN: no ecchymosis             PHYSICAL EXAM:  · CONSTITUTIONAL: Looks stable,  . NECK: Supple, no JVD, no bruit on either carotid side   · RESPIRATORY: Normal air entry to lung base, no wheeze, no crackle, no wet rales  · CARDIOVASCULAR: Normal S1, A2, P2, no murmur, no click, regular rate,  no rub,  · EXTREMITIES: No cyanosis, no clubbing, no edema  · VASCULAR: Pulses are regular, equal, bilateral in upper and lower extremities  	  TELEMETRY:nse      LABS:                        15.0   9.48  )-----------( 215      ( 22 Dec 2018 07:37 )             47.2     12-22    138  |  97<L>  |  24<H>  ----------------------------<  100<H>  4.4   |  27  |  1.1    Ca    9.3      22 Dec 2018 07:37  Mg     2.1     12-21    TPro  7.0  /  Alb  4.1  /  TBili  0.9  /  DBili  x   /  AST  17  /  ALT  23  /  AlkPhos  81  12-22            I&O's Summary    BNP  RADIOLOGY & ADDITIONAL STUDIES:    IMPRESSION AND PLAN:

## 2018-12-23 NOTE — PROGRESS NOTE ADULT - PROBLEM SELECTOR PLAN 1
pt with a non ischemic cm who appears euvolemic and not needing a diuretic.  cont coreg and lisinopril  d/c asa and decrease lipitor to 10 q24 with labs in 6 weeks on it  ep eval with f/u as an outpt and life vest  ambulate and if ambulating well then d/c lovenox  pt had an aortic root of 4.8 cm on ct scan and will need to be followed as an out pt  once seen by ep and life vest in place then consider d/c to home from a cardiac standpoint with f/u in the cardiology and ep clinic in 1 to 2 weeks

## 2018-12-24 ENCOUNTER — TRANSCRIPTION ENCOUNTER (OUTPATIENT)
Age: 43
End: 2018-12-24

## 2018-12-24 VITALS — WEIGHT: 265.44 LBS

## 2018-12-24 PROBLEM — Z00.00 ENCOUNTER FOR PREVENTIVE HEALTH EXAMINATION: Status: ACTIVE | Noted: 2018-12-24

## 2018-12-24 LAB
ANION GAP SERPL CALC-SCNC: 13 MMOL/L — SIGNIFICANT CHANGE UP (ref 7–14)
BUN SERPL-MCNC: 18 MG/DL — SIGNIFICANT CHANGE UP (ref 10–20)
CALCIUM SERPL-MCNC: 9.1 MG/DL — SIGNIFICANT CHANGE UP (ref 8.5–10.1)
CHLORIDE SERPL-SCNC: 99 MMOL/L — SIGNIFICANT CHANGE UP (ref 98–110)
CO2 SERPL-SCNC: 26 MMOL/L — SIGNIFICANT CHANGE UP (ref 17–32)
CREAT SERPL-MCNC: 1.1 MG/DL — SIGNIFICANT CHANGE UP (ref 0.7–1.5)
GLUCOSE SERPL-MCNC: 129 MG/DL — HIGH (ref 70–99)
HCT VFR BLD CALC: 46 % — SIGNIFICANT CHANGE UP (ref 42–52)
HGB BLD-MCNC: 14.7 G/DL — SIGNIFICANT CHANGE UP (ref 14–18)
MCHC RBC-ENTMCNC: 27.5 PG — SIGNIFICANT CHANGE UP (ref 27–31)
MCHC RBC-ENTMCNC: 32 G/DL — SIGNIFICANT CHANGE UP (ref 32–37)
MCV RBC AUTO: 86.1 FL — SIGNIFICANT CHANGE UP (ref 80–94)
NRBC # BLD: 0 /100 WBCS — SIGNIFICANT CHANGE UP (ref 0–0)
PLATELET # BLD AUTO: 225 K/UL — SIGNIFICANT CHANGE UP (ref 130–400)
POTASSIUM SERPL-MCNC: 4.3 MMOL/L — SIGNIFICANT CHANGE UP (ref 3.5–5)
POTASSIUM SERPL-SCNC: 4.3 MMOL/L — SIGNIFICANT CHANGE UP (ref 3.5–5)
RBC # BLD: 5.34 M/UL — SIGNIFICANT CHANGE UP (ref 4.7–6.1)
RBC # FLD: 13.8 % — SIGNIFICANT CHANGE UP (ref 11.5–14.5)
RPR SER-TITR: SIGNIFICANT CHANGE UP
SODIUM SERPL-SCNC: 138 MMOL/L — SIGNIFICANT CHANGE UP (ref 135–146)
WBC # BLD: 7.94 K/UL — SIGNIFICANT CHANGE UP (ref 4.8–10.8)
WBC # FLD AUTO: 7.94 K/UL — SIGNIFICANT CHANGE UP (ref 4.8–10.8)

## 2018-12-24 RX ORDER — CARVEDILOL PHOSPHATE 80 MG/1
1 CAPSULE, EXTENDED RELEASE ORAL
Qty: 60 | Refills: 0 | OUTPATIENT
Start: 2018-12-24 | End: 2019-01-22

## 2018-12-24 RX ORDER — LISINOPRIL 2.5 MG/1
1 TABLET ORAL
Qty: 30 | Refills: 0
Start: 2018-12-24 | End: 2019-02-26

## 2018-12-24 RX ORDER — LISINOPRIL 2.5 MG/1
1 TABLET ORAL
Qty: 30 | Refills: 0 | OUTPATIENT
Start: 2018-12-24 | End: 2019-01-22

## 2018-12-24 RX ORDER — ATORVASTATIN CALCIUM 80 MG/1
1 TABLET, FILM COATED ORAL
Qty: 30 | Refills: 0
Start: 2018-12-24 | End: 2019-02-26

## 2018-12-24 RX ORDER — FUROSEMIDE 40 MG
1 TABLET ORAL
Qty: 15 | Refills: 0 | OUTPATIENT
Start: 2018-12-24

## 2018-12-24 RX ORDER — CARVEDILOL PHOSPHATE 80 MG/1
1 CAPSULE, EXTENDED RELEASE ORAL
Qty: 60 | Refills: 0
Start: 2018-12-24 | End: 2019-02-26

## 2018-12-24 RX ORDER — ATORVASTATIN CALCIUM 80 MG/1
1 TABLET, FILM COATED ORAL
Qty: 30 | Refills: 0 | OUTPATIENT
Start: 2018-12-24 | End: 2019-01-22

## 2018-12-24 RX ORDER — FUROSEMIDE 40 MG
1 TABLET ORAL
Qty: 15 | Refills: 0
Start: 2018-12-24

## 2018-12-24 RX ADMIN — CHLORHEXIDINE GLUCONATE 1 APPLICATION(S): 213 SOLUTION TOPICAL at 06:28

## 2018-12-24 RX ADMIN — CARVEDILOL PHOSPHATE 6.25 MILLIGRAM(S): 80 CAPSULE, EXTENDED RELEASE ORAL at 06:20

## 2018-12-24 RX ADMIN — ENOXAPARIN SODIUM 40 MILLIGRAM(S): 100 INJECTION SUBCUTANEOUS at 06:19

## 2018-12-24 RX ADMIN — LISINOPRIL 5 MILLIGRAM(S): 2.5 TABLET ORAL at 06:20

## 2018-12-24 RX ADMIN — INFLUENZA VIRUS VACCINE 0.5 MILLILITER(S): 15; 15; 15; 15 SUSPENSION INTRAMUSCULAR at 14:26

## 2018-12-24 NOTE — DISCHARGE NOTE ADULT - ADDITIONAL INSTRUCTIONS
Please follow up with your doctor. An appointment has been made for you on 12/27 at 1:30PM. Please take all of your prescriptions as directed. Please follow up with your [facility] appointment. An appointment has been made for you on 12/27 at 1:30PM. Please take all of your prescriptions as directed. Prescriptions have been sent to [facility] on [facility]. Please follow up with Electrophysiology in 4-6 weeks.

## 2018-12-24 NOTE — PROGRESS NOTE ADULT - ATTENDING COMMENTS
Patient with new onset cardiomyopathy.     Continue BB and ACEi  Patient without insurance and cannot afford [employer]  Will follow up as an outpatient and repeat ECHO to see if cardiomyopathy improves.  Patient will need cardiology follow up for thoracic aortic aneurysm

## 2018-12-24 NOTE — DISCHARGE NOTE ADULT - CARE PLAN
Principal Discharge DX:	CHF (congestive heart failure)  Goal:	To monitor and treat  Assessment and plan of treatment:	Please follow up with your doctor as an outpatient. You will need to get a life vest. Please continue to take your medications as an outpatient.  Secondary Diagnosis:	Aortic root dilatation  Goal:	To monitor  Assessment and plan of treatment:	Please follow up with cardiology to monitor your aortic root. Continue taking your medications as prescribed.

## 2018-12-24 NOTE — PROGRESS NOTE ADULT - SUBJECTIVE AND OBJECTIVE BOX
Hospital Day:  6d    Subjective:  No overnight events. Telemetry reviewed sinus 80s with no overnight events. Seen and examined at bedside. Reported no acute complaints. No fevers, chills, nausea, vomiting, chest pain, shortness of breath, constipation, diarrhea.     Past Medical Hx:   No pertinent past medical history    Past Sx:  No significant past surgical history    Allergies:  No Known Allergies    Current Meds:   Standng Meds:  atorvastatin 10 milliGRAM(s) Oral at bedtime  carvedilol 6.25 milliGRAM(s) Oral every 12 hours  chlorhexidine 4% Liquid 1 Application(s) Topical <User Schedule>  enoxaparin Injectable 40 milliGRAM(s) SubCutaneous every 24 hours  influenza   Vaccine 0.5 milliLiter(s) IntraMuscular once  lisinopril 5 milliGRAM(s) Oral daily    PRN Meds:    HOME MEDICATIONS:      Vital Signs:   T(F): 97.6 (12-24-18 @ 05:56), Max: 98.3 (12-23-18 @ 14:20)  HR: 75 (12-24-18 @ 05:56) (65 - 96)  BP: 97/62 (12-24-18 @ 05:56) (97/62 - 106/61)  RR: 18 (12-24-18 @ 05:56) (18 - 20)  SpO2: 99% (12-23-18 @ 14:20) (99% - 99%)        Physical Exam:   GENERAL: NAD, lying in bed, pleasant and conversive   HEENT: NCAT, PERRLA, EOMI  CHEST/LUNG: CTA, No wheezing, rhonchi, rales  HEART: Regular rate and rhythm; s1 s2 appreciated, No murmurs, rubs, or gallops  ABDOMEN: Soft, Nontender, Nondistended; Bowel sounds present  EXTREMITIES: No LE edema b/l, No cyanosis, no clubbing, Peripheral pulses 2+ bilaterally in upper and lower extremities  NERVOUS SYSTEM:  Alert & Oriented X3, Cranial nerves ii-xii grossly intact, sensation equal and bilateral     Labs:                         14.7   7.94  )-----------( 225      ( 24 Dec 2018 07:01 )             46.0       24 Dec 2018 07:01    138    |  99     |  18     ----------------------------<  129    4.3     |  26     |  1.1      Ca    9.1        24 Dec 2018 07:01    Hemoglobin A1C, Whole Blood: 6.1 % (12-19-18 @ 07:04)    Serum Pro-Brain Natriuretic Peptide: 448 pg/mL (12-18-18 @ 08:21)    Radiology:   None Today    Assessment and Plan:   This is a 43 year old male with no PMHx who presented with Shortness of Breath for the past three weeks and is admitted for the primary diagnosis of CHF     #Acute new onset of CHF exacerbation  - TTE 12/19: EF 20-25%, Moderately enlarged Lt atrium, Mild TR, Mild AR, aortic root dilatation   - Cardiac cath: Nonobstructive coronary artery disease   - Continue on telemetry monitoring while in patient   - Cardiology following: Continue on Lipitor 10mg q24, coreg 12.5 BID, and Lisinopril  -    #Thoracic aortic aneurysm  - Optimized medical management: Statin, BP control via b-blocker, ACE/ARB, and maintain systolic BP <120  - Needs outpatient followup.   - RPR negative     #Bilateral LE edema  - Resolved   - Duplex negative; No DVT     Activity: Ambulate as tolerated  Diet: DASH  DVT ppx: Not indicated as ambulating   GI ppx: Not indicated  Code Status: Full Code  DISPO: From home, pending life vest for discharge Hospital Day:  6d    Subjective:  No overnight events. Telemetry reviewed sinus 80s with no overnight events. Seen and examined at bedside. Reported no acute complaints. No fevers, chills, nausea, vomiting, chest pain, shortness of breath, constipation, diarrhea.     Past Medical Hx:   No pertinent past medical history    Past Sx:  No significant past surgical history    Allergies:  No Known Allergies    Current Meds:   Standng Meds:  atorvastatin 10 milliGRAM(s) Oral at bedtime  carvedilol 6.25 milliGRAM(s) Oral every 12 hours  chlorhexidine 4% Liquid 1 Application(s) Topical <User Schedule>  enoxaparin Injectable 40 milliGRAM(s) SubCutaneous every 24 hours  influenza   Vaccine 0.5 milliLiter(s) IntraMuscular once  lisinopril 5 milliGRAM(s) Oral daily    PRN Meds:    HOME MEDICATIONS:      Vital Signs:   T(F): 97.6 (12-24-18 @ 05:56), Max: 98.3 (12-23-18 @ 14:20)  HR: 75 (12-24-18 @ 05:56) (65 - 96)  BP: 97/62 (12-24-18 @ 05:56) (97/62 - 106/61)  RR: 18 (12-24-18 @ 05:56) (18 - 20)  SpO2: 99% (12-23-18 @ 14:20) (99% - 99%)        Physical Exam:   GENERAL: NAD, lying in bed, pleasant and conversive   HEENT: NCAT, PERRLA, EOMI  CHEST/LUNG: CTA, No wheezing, rhonchi, rales  HEART: Regular rate and rhythm; s1 s2 appreciated, No murmurs, rubs, or gallops  ABDOMEN: Soft, Nontender, Nondistended; Bowel sounds present  EXTREMITIES: No LE edema b/l, No cyanosis, no clubbing, Peripheral pulses 2+ bilaterally in upper and lower extremities  NERVOUS SYSTEM:  Alert & Oriented X3, Cranial nerves ii-xii grossly intact, sensation equal and bilateral     Labs:                         14.7   7.94  )-----------( 225      ( 24 Dec 2018 07:01 )             46.0       24 Dec 2018 07:01    138    |  99     |  18     ----------------------------<  129    4.3     |  26     |  1.1      Ca    9.1        24 Dec 2018 07:01    Hemoglobin A1C, Whole Blood: 6.1 % (12-19-18 @ 07:04)    Serum Pro-Brain Natriuretic Peptide: 448 pg/mL (12-18-18 @ 08:21)    Radiology:   None Today    Assessment and Plan:   This is a 43 year old male with no PMHx who presented with Shortness of Breath for the past three weeks and is admitted for the primary diagnosis of CHF     #Acute new onset of CHF exacerbation  - TTE 12/19: EF 20-25%, Moderately enlarged Lt atrium, Mild TR, Mild AR, aortic root dilatation   - Cardiac cath: Nonobstructive coronary artery disease   - Continue on telemetry monitoring while in patient   - Cardiology following: Continue on Lipitor 10mg q24, coreg 12.5 BID, and Lisinopril  - EPS: will need a lifevest. Social work pending for determining means of payment as patient reports being unable to pay for it. f/u in 4-6 weeks     #Thoracic aortic aneurysm  - Optimized medical management: Statin, BP control via b-blocker, ACE/ARB, and maintain systolic BP <120  - Needs outpatient followup.   - RPR negative     #Bilateral LE edema  - Resolved   - Duplex negative; No DVT     Activity: Ambulate as tolerated  Diet: DASH  DVT ppx: Not indicated as ambulating   GI ppx: Not indicated  Code Status: Full Code  DISPO: From home, pending life vest for discharge    Discharge med rec reviewed with Dr. Mccarty. Seven day course of medication sent to [facility]     Follow up appointment: Dr. Tobar at [facility]; Thursday 12/27/2018 Hospital Day:  6d    Subjective:  No overnight events. Telemetry reviewed sinus 80s with no overnight events. Seen and examined at bedside. Reported no acute complaints. No fevers, chills, nausea, vomiting, chest pain, shortness of breath, constipation, diarrhea.     Past Medical Hx:   No pertinent past medical history    Past Sx:  No significant past surgical history    Allergies:  No Known Allergies    Current Meds:   Standng Meds:  atorvastatin 10 milliGRAM(s) Oral at bedtime  carvedilol 6.25 milliGRAM(s) Oral every 12 hours  chlorhexidine 4% Liquid 1 Application(s) Topical <User Schedule>  enoxaparin Injectable 40 milliGRAM(s) SubCutaneous every 24 hours  influenza   Vaccine 0.5 milliLiter(s) IntraMuscular once  lisinopril 5 milliGRAM(s) Oral daily    PRN Meds:    HOME MEDICATIONS:      Vital Signs:   T(F): 97.6 (12-24-18 @ 05:56), Max: 98.3 (12-23-18 @ 14:20)  HR: 75 (12-24-18 @ 05:56) (65 - 96)  BP: 97/62 (12-24-18 @ 05:56) (97/62 - 106/61)  RR: 18 (12-24-18 @ 05:56) (18 - 20)  SpO2: 99% (12-23-18 @ 14:20) (99% - 99%)        Physical Exam:   GENERAL: NAD, lying in bed, pleasant and conversive   HEENT: NCAT, PERRLA, EOMI  CHEST/LUNG: CTA, No wheezing, rhonchi, rales  HEART: Regular rate and rhythm; s1 s2 appreciated, No murmurs, rubs, or gallops  ABDOMEN: Soft, Nontender, Nondistended; Bowel sounds present  EXTREMITIES: No LE edema b/l, No cyanosis, no clubbing, Peripheral pulses 2+ bilaterally in upper and lower extremities  NERVOUS SYSTEM:  Alert & Oriented X3, Cranial nerves ii-xii grossly intact, sensation equal and bilateral     Labs:                         14.7   7.94  )-----------( 225      ( 24 Dec 2018 07:01 )             46.0       24 Dec 2018 07:01    138    |  99     |  18     ----------------------------<  129    4.3     |  26     |  1.1      Ca    9.1        24 Dec 2018 07:01    Hemoglobin A1C, Whole Blood: 6.1 % (12-19-18 @ 07:04)    Serum Pro-Brain Natriuretic Peptide: 448 pg/mL (12-18-18 @ 08:21)    Radiology:   None Today    Assessment and Plan:   This is a 43 year old male with no PMHx who presented with Shortness of Breath for the past three weeks and is admitted for the primary diagnosis of CHF     #Acute new onset of CHF exacerbation  - TTE 12/19: EF 20-25%, Moderately enlarged Lt atrium, Mild TR, Mild AR, aortic root dilatation   - Cardiac cath: Nonobstructive coronary artery disease   - Continue on telemetry monitoring while in patient   - Cardiology following: Continue on Lipitor 10mg q24, coreg 12.5 BID, and Lisinopril  - EPS: will need a lifevest. Social work pending for determining means of payment as patient reports being unable to pay for it. f/u in 4-6 weeks     #Thoracic aortic aneurysm  - Optimized medical management: Statin, BP control via b-blocker, ACE/ARB, and maintain systolic BP <120  - Needs outpatient followup.   - RPR negative     #Bilateral LE edema  - Resolved   - Duplex negative; No DVT     Activity: Ambulate as tolerated  Diet: DASH  DVT ppx: Not indicated as ambulating   GI ppx: Not indicated  Code Status: Full Code  DISPO: From home, d/c home      FINAL DISCHARGE INTERVIEW:  Resident(s) Present: Estevan Jean RN Present: Syed    DISCHARGE MEDICATION RECONCILIATION  reviewed with Attending Dr. Mccarty    DISPOSITION:   [ x ] Home,    [  ] Home with Visiting Nursing Services,   [    ]  SNF/ NH,    [   ] Acute Rehab (4A),   [   ] Other (Specify:_________)                             Discharge med rec reviewed with Dr. Mccarty. Seven day course of medication sent to [facility]     Follow up appointment: [facility]. [facility]ar at [facility]; Thursday 12/27/2018

## 2018-12-24 NOTE — PROGRESS NOTE ADULT - SUBJECTIVE AND OBJECTIVE BOX
SUBJECTIVE:    Patient is a 43y old Male who presents with a chief complaint of Shortness of breath (24 Dec 2018 12:33)    Currently admitted to medicine with the primary diagnosis of CHF (congestive heart failure)     Today is hospital day 6d.     PAST MEDICAL & SURGICAL HISTORY  No pertinent past medical history  No significant past surgical history    ALLERGIES:  No Known Allergies    MEDICATIONS:  STANDING MEDICATIONS  atorvastatin 10 milliGRAM(s) Oral at bedtime  carvedilol 6.25 milliGRAM(s) Oral every 12 hours  chlorhexidine 4% Liquid 1 Application(s) Topical <User Schedule>  enoxaparin Injectable 40 milliGRAM(s) SubCutaneous every 24 hours  influenza   Vaccine 0.5 milliLiter(s) IntraMuscular once  lisinopril 5 milliGRAM(s) Oral daily    PRN MEDICATIONS    VITALS:   T(F): 97.6  HR: 75  BP: 97/62  RR: 18  SpO2: 99%    LABS:                        14.7   7.94  )-----------( 225      ( 24 Dec 2018 07:01 )             46.0     12-24    138  |  99  |  18  ----------------------------<  129<H>  4.3   |  26  |  1.1    Ca    9.1      24 Dec 2018 07:01                    RADIOLOGY:    PHYSICAL EXAM:  GEN: No acute distress  LUNGS: Clear to auscultation bilaterally   HEART: S1/S2 present. RRR.   ABD/ GI: Soft, non-tender, non-distended. Bowel sounds present  EXT: NC/NC/NE/2+PP/NASH  NEURO: AAOX3

## 2018-12-24 NOTE — DISCHARGE NOTE ADULT - MEDICATION SUMMARY - MEDICATIONS TO TAKE
I will START or STAY ON the medications listed below when I get home from the hospital:    lisinopril 5 mg oral tablet  -- 1 tab(s) by mouth once a day  -- Indication: For CHF(CONGESTIVE HEART FAILURE) CHEST PAIN    atorvastatin 10 mg oral tablet  -- 1 tab(s) by mouth once a day (at bedtime)  -- Indication: For CHF(CONGESTIVE HEART FAILURE) CHEST PAIN    carvedilol 6.25 mg oral tablet  -- 1 tab(s) by mouth every 12 hours  -- Indication: For CHF(CONGESTIVE HEART FAILURE) CHEST PAIN I will START or STAY ON the medications listed below when I get home from the hospital:    lisinopril 5 mg oral tablet  -- 1 tab(s) by mouth once a day  -- Indication: For CHF(CONGESTIVE HEART FAILURE) CHEST PAIN    atorvastatin 10 mg oral tablet  -- 1 tab(s) by mouth once a day (at bedtime)  -- Indication: For CHF(CONGESTIVE HEART FAILURE) CHEST PAIN    carvedilol 3.125 mg oral tablet  -- 1 tab(s) by mouth 2 times a day   -- It is very important that you take or use this exactly as directed.  Do not skip doses or discontinue unless directed by your doctor.  May cause drowsiness.  Alcohol may intensify this effect.  Use care when operating dangerous machinery.  Some non-prescription drugs may aggravate your condition.  Read all labels carefully.  If a warning appears, check with your doctor before taking.  Take with food or milk.    -- Indication: For CHF (congestive heart failure)    furosemide 40 mg oral tablet  -- 1 tab(s) by mouth once a day, As Needed if you are short of breath.  -- Avoid prolonged or excessive exposure to direct and/or artificial sunlight while taking this medication.  It is very important that you take or use this exactly as directed.  Do not skip doses or discontinue unless directed by your doctor.  It may be advisable to drink a full glass orange juice or eat a banana daily while taking this medication.    -- Indication: For CHF (congestive heart failure)

## 2018-12-24 NOTE — PROGRESS NOTE ADULT - REASON FOR ADMISSION
Shortness of breath

## 2018-12-24 NOTE — DISCHARGE NOTE ADULT - PATIENT PORTAL LINK FT
You can access the [email or web address], offered by Health system, by registering with the following website: [email or web address]

## 2018-12-24 NOTE — DISCHARGE NOTE ADULT - PLAN OF CARE
To monitor and treat Please follow up with your doctor as an outpatient. You will need to get a life vest. Please continue to take your medications as an outpatient. To monitor Please follow up with cardiology to monitor your aortic root. Continue taking your medications as prescribed.

## 2018-12-24 NOTE — DISCHARGE NOTE ADULT - CARE PROVIDER_API CALL
Smith Tobar), Geriatric Medicine; Internal Medicine  [address] [place] [id number]  Phone: [phone]  Fax: [phone] Smith Tobar), Geriatric Medicine; Internal Medicine  [address] [place], [place] [id number]  Phone: [phone]  Fax: [phone]    Danya Lawrence), Medicine  Physicians  [address] [place] [id number]  Phone: [phone]  Fax: [phone]

## 2018-12-24 NOTE — PROGRESS NOTE ADULT - PROBLEM SELECTOR PLAN 1
pt is euvolemic on current meds with a non ischemic cm  cont current meds  f/u ep for icd/lifevest  f/u in cardiology clinic,   f/u with aortic root enlargement with cardiology clinic,  pt is aware.    pt is stable from a cardiac standpoint.

## 2018-12-24 NOTE — PROGRESS NOTE ADULT - SUBJECTIVE AND OBJECTIVE BOX
INTERVAL HPI/OVERNIGHT EVENTS: No acute overnight events.     MEDICATIONS  (STANDING):  atorvastatin 10 milliGRAM(s) Oral at bedtime  carvedilol 6.25 milliGRAM(s) Oral every 12 hours  chlorhexidine 4% Liquid 1 Application(s) Topical <User Schedule>  enoxaparin Injectable 40 milliGRAM(s) SubCutaneous every 24 hours  influenza   Vaccine 0.5 milliLiter(s) IntraMuscular once  lisinopril 5 milliGRAM(s) Oral daily    MEDICATIONS  (PRN):    Allergies  No Known Allergies    Intolerances        Vital Signs Last 24 Hrs  T(C): 36.4 (24 Dec 2018 05:56), Max: 36.8 (23 Dec 2018 14:20)  T(F): 97.6 (24 Dec 2018 05:56), Max: 98.3 (23 Dec 2018 14:20)  HR: 75 (24 Dec 2018 05:56) (65 - 96)  BP: 97/62 (24 Dec 2018 05:56) (97/62 - 106/61)  BP(mean): --  RR: 18 (24 Dec 2018 05:56) (18 - 20)  SpO2: 99% (23 Dec 2018 14:20) (99% - 99%)      TELE: NSR 70s-90s    Physical Exam  GENERAL: In no apparent distress, well nourished, and hydrated.  HEAD:  Atraumatic, Normocephalic  EYES: EOMI, PERRLA, conjunctiva and sclera clear  ENMT: Moist mucous membranes  NECK: Supple and normal thyroid.  No JVD or carotid bruit.    HEART: Regular rate and rhythm; No murmurs, rubs, or gallops.  PULMONARY: Clear to auscultation and perfusion.  No rales, wheezing, or rhonchi bilaterally.  ABDOMEN: Normoactive bowel sounds. Soft, Nontender, Nondistended.  EXTREMITIES:  2+ Peripheral Pulses. No clubbing, cyanosis, or edema  NEUROLOGICAL: Grossly nonfocal    LABS:                        14.7   7.94  )-----------( 225      ( 24 Dec 2018 07:01 )             46.0     12-24    138  |  99  |  18  ----------------------------<  129<H>  4.3   |  26  |  1.1    Ca    9.1      24 Dec 2018 07:01      RADIOLOGY & ADDITIONAL TESTS:

## 2018-12-24 NOTE — PROGRESS NOTE ADULT - SUBJECTIVE AND OBJECTIVE BOX
Subjective: pt feels well and is ambulating with no cp nor sob      MEDICATIONS  (STANDING):  atorvastatin 10 milliGRAM(s) Oral at bedtime  carvedilol 6.25 milliGRAM(s) Oral every 12 hours  chlorhexidine 4% Liquid 1 Application(s) Topical <User Schedule>  enoxaparin Injectable 40 milliGRAM(s) SubCutaneous every 24 hours  influenza   Vaccine 0.5 milliLiter(s) IntraMuscular once  lisinopril 5 milliGRAM(s) Oral daily    MEDICATIONS  (PRN):            Vital Signs Last 24 Hrs  T(C): 36.4 (24 Dec 2018 05:56), Max: 36.8 (23 Dec 2018 14:20)  T(F): 97.6 (24 Dec 2018 05:56), Max: 98.3 (23 Dec 2018 14:20)  HR: 75 (24 Dec 2018 05:56) (65 - 96)  BP: 97/62 (24 Dec 2018 05:56) (97/62 - 106/61)  BP(mean): --  RR: 18 (24 Dec 2018 05:56) (18 - 20)  SpO2: 99% (23 Dec 2018 14:20) (99% - 99%)             REVIEW OF SYSTEMS:  CONSTITUTIONAL: no fever, no chills, no diaphoresis  CARDIOLOGY: no chest pain, no SOB, no palpitation, no diaphoresis, no faint   RESPIRATORY: no dyspnea, no wheeze, no orthopnea, no PND   NEUROLOGICAL: no dizziness, headache, focal deficits to report.  GI: no abdominal pain, no dyspepsia, no nausea, no vomiting, no diarrhea.    HEENT: no congestion, no nasal bleeding  SKIN: no ecchymosis, no ptechia             PHYSICAL EXAM:  · CONSTITUTIONAL: Looks stable  . NECK: Supple, no JVD, no bruit on either carotid side   · RESPIRATORY: Normal air entry to lung base, no wheeze, no crackle, no wet rales  · CARDIOVASCULAR: Normal S1, A2, P2, no murmur, no click, regular rate,  no rub,  · EXTREMITIES: No cyanosis, no clubbing, no edema  · VASCULAR: Pulses are regular, equal, bilateral in upper and lower extremities  	  TELEMETRY: nsr        LABS:                        14.7   7.94  )-----------( 225      ( 24 Dec 2018 07:01 )             46.0     12-24    138  |  99  |  18  ----------------------------<  129<H>  4.3   |  26  |  1.1    Ca    9.1      24 Dec 2018 07:01              I&O's Summary    BNP  RADIOLOGY & ADDITIONAL STUDIES:    IMPRESSION AND PLAN:

## 2018-12-24 NOTE — DISCHARGE NOTE ADULT - CARE PROVIDERS DIRECT ADDRESSES
[email or web address]

## 2018-12-24 NOTE — DISCHARGE NOTE ADULT - HOSPITAL COURSE
43 Yr M no PMH presenting with worsening SOB. He reports SOB, nonproductive cough, dyspnea on exertion, and 3 pillow orthopnea. Pt reports symptoms started 2 week ago and has been progressively getting worse. Pt also reports LLE swelling. He denies chest pain, dizziness, fever, chills, hemoptysis, abd pain, nausea, vomiting or diarrhea. He denies any sick contact, but reports long flight from [place] 3 days ago. A CTA in the ED showed an ascending aortic aneurysm of 4.8cm. A cardiac cath showed nonobstructive coronary disease. His TTE showed an EF 20-25% with moderately enlarged left atrium, mild tricuspid regurgitation, dilated aortic root. He would require a life vest as per EP, but he ultimately has no insurance or means to afford. He was subsequently discharged on optimal medical therapy on hospital day 6 with follow up [facility]. He was monitored on telemetry for the duration of his stay with no events.

## 2018-12-24 NOTE — PROGRESS NOTE ADULT - ASSESSMENT
# Acute, new onset CHF exacerbation  - finding of pulmonary artery dilation suspected pulmonary artery hypertension  - tele-monitoring  - cardiac enzymes are negative  - 2D Echo< from: Transthoracic Echocardiogram (12.19.18 @ 13:06) >   1. Left ventricular ejection fraction, by visual estimation, is 20 to   25%.   2. Moderately enlarged left atrium.   3. Mild tricuspid regurgitation.   4. Mild aortic regurgitation.   5. Trace pulmonic valve regurgitation.   6. Dilatation of the aortic root.    < end of copied text >    -  IV lasix 40 mg  not needed now --seems to be dry now  - need pulmonary evaluation outpatient for sleep study and work up and management for pulmonary artery hypertension  -- Cardiac cath  was  done yesterday and he has nonobstructive coronary disease-- symptoms could be due to viral upper respiratory infection.  - start low dose carvedilol  - EPS evaluation-- suggested life vest, which he cannot afford.    # Thoracic aorta aneurysm 4.8 cm  - medical and risk factors optimization with statin and BP control with beta-blocker, ARB/ACE-inhibitor, SBP goal <120 , currently started on lisinopril 5 mg, added coreg  - - pt is explained the findings, need for consistent outpatient monitoring, the risk of rupture from expansion, the need for repair if it expands beyond certain width  - syphilis screen was negative  - CT angiogram  not needed as was done at admission    # Bilateral lower extremity edema resolved  -  venous duplex-- no DVT    patient has no insurance  patient will get a supply of  1wwek of medications and then he can buy his medications.   he has [facility] appointment and will get his echo repeated--   Patient is aware of sudden cardiac death without life vest   he had no v tach or any arrythmia on telemetry.  Dc home today-spen tmore than 30mins

## 2018-12-27 ENCOUNTER — APPOINTMENT (OUTPATIENT)
Dept: INTERNAL MEDICINE | Facility: CLINIC | Age: 43
End: 2018-12-27

## 2019-01-28 ENCOUNTER — TRANSCRIPTION ENCOUNTER (OUTPATIENT)
Age: 44
End: 2019-01-28

## 2019-01-28 ENCOUNTER — EMERGENCY (EMERGENCY)
Facility: HOSPITAL | Age: 44
LOS: 0 days | Discharge: HOME | End: 2019-01-28
Attending: EMERGENCY MEDICINE | Admitting: EMERGENCY MEDICINE

## 2019-01-28 VITALS
DIASTOLIC BLOOD PRESSURE: 85 MMHG | TEMPERATURE: 97 F | OXYGEN SATURATION: 98 % | HEART RATE: 86 BPM | RESPIRATION RATE: 20 BRPM | SYSTOLIC BLOOD PRESSURE: 156 MMHG

## 2019-01-28 DIAGNOSIS — I50.9 HEART FAILURE, UNSPECIFIED: ICD-10-CM

## 2019-01-28 DIAGNOSIS — I11.0 HYPERTENSIVE HEART DISEASE WITH HEART FAILURE: ICD-10-CM

## 2019-01-28 DIAGNOSIS — Z79.2 LONG TERM (CURRENT) USE OF ANTIBIOTICS: ICD-10-CM

## 2019-01-28 DIAGNOSIS — Z79.01 LONG TERM (CURRENT) USE OF ANTICOAGULANTS: ICD-10-CM

## 2019-01-28 DIAGNOSIS — M77.9 ENTHESOPATHY, UNSPECIFIED: ICD-10-CM

## 2019-01-28 DIAGNOSIS — R06.02 SHORTNESS OF BREATH: ICD-10-CM

## 2019-01-28 DIAGNOSIS — Z98.890 OTHER SPECIFIED POSTPROCEDURAL STATES: ICD-10-CM

## 2019-01-28 DIAGNOSIS — Z79.899 OTHER LONG TERM (CURRENT) DRUG THERAPY: ICD-10-CM

## 2019-01-28 LAB
ALBUMIN SERPL ELPH-MCNC: 4.4 G/DL — SIGNIFICANT CHANGE UP (ref 3.5–5.2)
ALP SERPL-CCNC: 94 U/L — SIGNIFICANT CHANGE UP (ref 30–115)
ALT FLD-CCNC: 29 U/L — SIGNIFICANT CHANGE UP (ref 0–41)
ANION GAP SERPL CALC-SCNC: 11 MMOL/L — SIGNIFICANT CHANGE UP (ref 7–14)
AST SERPL-CCNC: 20 U/L — SIGNIFICANT CHANGE UP (ref 0–41)
BILIRUB SERPL-MCNC: 0.5 MG/DL — SIGNIFICANT CHANGE UP (ref 0.2–1.2)
BUN SERPL-MCNC: 10 MG/DL — SIGNIFICANT CHANGE UP (ref 10–20)
CALCIUM SERPL-MCNC: 9.7 MG/DL — SIGNIFICANT CHANGE UP (ref 8.5–10.1)
CHLORIDE SERPL-SCNC: 99 MMOL/L — SIGNIFICANT CHANGE UP (ref 98–110)
CO2 SERPL-SCNC: 30 MMOL/L — SIGNIFICANT CHANGE UP (ref 17–32)
CREAT SERPL-MCNC: 1 MG/DL — SIGNIFICANT CHANGE UP (ref 0.7–1.5)
GLUCOSE SERPL-MCNC: 108 MG/DL — HIGH (ref 70–99)
HCT VFR BLD CALC: 46.4 % — SIGNIFICANT CHANGE UP (ref 42–52)
HGB BLD-MCNC: 15.2 G/DL — SIGNIFICANT CHANGE UP (ref 14–18)
MAGNESIUM SERPL-MCNC: 2.1 MG/DL — SIGNIFICANT CHANGE UP (ref 1.8–2.4)
MCHC RBC-ENTMCNC: 27.8 PG — SIGNIFICANT CHANGE UP (ref 27–31)
MCHC RBC-ENTMCNC: 32.8 G/DL — SIGNIFICANT CHANGE UP (ref 32–37)
MCV RBC AUTO: 84.8 FL — SIGNIFICANT CHANGE UP (ref 80–94)
NRBC # BLD: 0 /100 WBCS — SIGNIFICANT CHANGE UP (ref 0–0)
NT-PROBNP SERPL-SCNC: 97 PG/ML — SIGNIFICANT CHANGE UP (ref 0–300)
PLATELET # BLD AUTO: 175 K/UL — SIGNIFICANT CHANGE UP (ref 130–400)
POTASSIUM SERPL-MCNC: 4.5 MMOL/L — SIGNIFICANT CHANGE UP (ref 3.5–5)
POTASSIUM SERPL-SCNC: 4.5 MMOL/L — SIGNIFICANT CHANGE UP (ref 3.5–5)
PROT SERPL-MCNC: 7.3 G/DL — SIGNIFICANT CHANGE UP (ref 6–8)
RBC # BLD: 5.47 M/UL — SIGNIFICANT CHANGE UP (ref 4.7–6.1)
RBC # FLD: 13.2 % — SIGNIFICANT CHANGE UP (ref 11.5–14.5)
SODIUM SERPL-SCNC: 140 MMOL/L — SIGNIFICANT CHANGE UP (ref 135–146)
TROPONIN T SERPL-MCNC: <0.01 NG/ML — SIGNIFICANT CHANGE UP
WBC # BLD: 6.41 K/UL — SIGNIFICANT CHANGE UP (ref 4.8–10.8)
WBC # FLD AUTO: 6.41 K/UL — SIGNIFICANT CHANGE UP (ref 4.8–10.8)

## 2019-01-28 RX ORDER — FUROSEMIDE 40 MG
1 TABLET ORAL
Qty: 15 | Refills: 0
Start: 2019-01-28

## 2019-01-28 RX ORDER — ATORVASTATIN CALCIUM 80 MG/1
1 TABLET, FILM COATED ORAL
Qty: 30 | Refills: 0
Start: 2019-01-28 | End: 2019-02-26

## 2019-01-28 RX ORDER — ASPIRIN/CALCIUM CARB/MAGNESIUM 324 MG
325 TABLET ORAL ONCE
Qty: 0 | Refills: 0 | Status: COMPLETED | OUTPATIENT
Start: 2019-01-28 | End: 2019-01-28

## 2019-01-28 RX ORDER — FUROSEMIDE 40 MG
1 TABLET ORAL
Qty: 0 | Refills: 0 | DISCHARGE
Start: 2019-01-28

## 2019-01-28 RX ORDER — LISINOPRIL 2.5 MG/1
1 TABLET ORAL
Qty: 30 | Refills: 0
Start: 2019-01-28 | End: 2019-02-26

## 2019-01-28 RX ORDER — FUROSEMIDE 40 MG
40 TABLET ORAL ONCE
Qty: 0 | Refills: 0 | Status: COMPLETED | OUTPATIENT
Start: 2019-01-28 | End: 2019-01-28

## 2019-01-28 RX ORDER — FUROSEMIDE 40 MG
20 TABLET ORAL ONCE
Qty: 0 | Refills: 0 | Status: DISCONTINUED | OUTPATIENT
Start: 2019-01-28 | End: 2019-01-28

## 2019-01-28 RX ORDER — CARVEDILOL PHOSPHATE 80 MG/1
1 CAPSULE, EXTENDED RELEASE ORAL
Qty: 60 | Refills: 0
Start: 2019-01-28 | End: 2019-02-26

## 2019-01-28 RX ADMIN — Medication 325 MILLIGRAM(S): at 18:41

## 2019-01-28 RX ADMIN — Medication 40 MILLIGRAM(S): at 18:41

## 2019-01-28 NOTE — ED PROVIDER NOTE - CARE PROVIDER_API CALL
Avelino Francisco), Cardiovascular Disease; Internal Medicine  [address] [place] [address]  [address]  [place] [address]  Phone: [phone]  Fax: [phone]    Bear Alexandre), Medicine  [address] [place] [address]  Phone: [phone]  Fax: [phone]

## 2019-01-28 NOTE — ED PROVIDER NOTE - PROGRESS NOTE DETAILS
EKG reviewed, nonischemic, no sig change from prior 12/18/18. labs, CXR, lasix, reassess.  With recent neg cardiac cath, much less concerned that CP is indicative of ACS, far more likely related to CHF/HTN with missed meds.  Unclear yet if pt would be better served with admission or obs vs dc home, refill meds and provide info for making o/p appointments. Pt reports SOB and CP completely resolved after getting lasix. He does not want to stay for tele monitoring and further diuresis, but is willing to take PO lasix, get med refills and follow up with cardiology.  Pt with nonischemic EKG, trop neg x 1, suspect SOB/CP related to mild CHF exacerbation after running out of his meds, with no evidence of cardiac demand/strain given neg trop.  No concern for ACS given recent neg cath.  Will refill his meds, including lasix 40mg PO and refer urgently to cardiology with careful return precautions.

## 2019-01-28 NOTE — ED PROVIDER NOTE - OBJECTIVE STATEMENT
This is a 43yM HTN DLD CHF (recently diagnosed 12/2018, neg cardiac cath, echo w/ likely pulm art HTN) who presents for SOB and CP x 2-3d after running out of his medications.  He reports that he initially did not have insurance when he was first hospitalized and was unable to make appointments at the time of his discharge. Since then, he has acquired insurance, but did not know the names of his doctors or whom to call to get med refills or make those appointments.  He says that his sister took his discharge papers.  He does not know the names of his medications (says there were 4 of them) and says this is only his 2nd visit in his adult lifetime to see a doctor, so he did not realize the name of his MD might be on his medications nor what he could do to resolve this problem.  Admits to mild breathlessness and mild vague anterior CP, not as severe as the CP that brought him into the ED in December.  Also mild b/l LE edema.

## 2019-01-28 NOTE — ED PROVIDER NOTE - PHYSICAL EXAMINATION
CONSTITUTIONAL: well developed; well nourished; well appearing in no acute distress  HEAD: normocephalic; atraumatic  EYES: no conjunctival injection, no scleral icterus  ENT: no nasal discharge; airway clear.  NECK: supple; non tender. + full passive ROM in all directions  CARD: S1, S2 normal; no murmurs, gallops, or rubs. Regular rate and rhythm  RESP: no wheezes, rales or rhonchi. Good air movement bilaterally without significant accessory muscle use  ABD: soft; non-distended; non-tender. No rebound, no guarding, no pulsatile abdominal mass  EXT: moving all extremities spontaneously, normal ROM. +b/l LE edema  SKIN: warm and dry, no lesions noted  NEURO: alert, oriented, CN II-XII grossly intact, motor and sensory grossly intact, speech nonslurred, stable gait, no focal deficits. GCS 15  PSYCH: calm, cooperative, appropriate, good eye contact, logical thought process, no apparent danger to self or others

## 2019-01-28 NOTE — ED ADULT NURSE NOTE - OBJECTIVE STATEMENT
patient presents for complaint of chest pain, shortness of breath and leg swelling. also complaint of difficulty bearing weight and pain to left ankle.

## 2019-01-28 NOTE — ED PROVIDER NOTE - CARE PLAN
Principal Discharge DX:	Acute on chronic systolic congestive heart failure  Secondary Diagnosis:	Heel spur, left

## 2019-01-28 NOTE — ED PROVIDER NOTE - MEDICAL DECISION MAKING DETAILS
43yM HTN DLD CHF presents with mild CP and SOB after running out of his medications x 3d.  He reports sx are far more mild compared to his initial presentation 1m ago leading to his CHF diagnosis and mostly thinks he is feeling this way because he ran out of his meds and didn't know how to get refills since he didn't know which doctor he was supposed to see.  Labs reassuring, with trop neg x 1, EKG stable and nonischemic.  Review of EMR notes cath <1mo ago with nonobstructive CAD, so no concern that his sx represent ACS.  Suspect CP/SOB related to uncontrolled BP and/or mild CHF exacerbation in the setting of missed medication and pt reports feeling back to baseline after one dose lasix 40mg IVP.  Discussed admission for IV diuresis and further monitoring, but pt unwilling to stay for this, but would like med refill and name of cardiologist to see.  Will refill his meds, provide info for cardiology appointment and strict return precautions.  In order to avoid hospitalization as he desires, he needs urgent cardiology f/u (ideally within 2-3d) to assess sx, BP control, volume status or else he may need repeat hospitalization for CHF control.

## 2019-01-28 NOTE — ED PROVIDER NOTE - NS ED ROS FT
Constitutional: no fevers/chills, no sick contacts  Eyes: No visual changes, eye pain or discharge. No photophobia  ENMT: No hearing changes, pain, discharge or infections. No sore throat or drooling.  Neck:  No neck pain or stiffness. No limited ROM  Cardiac: + SOB, +edema. +CP  Respiratory: No cough or respiratory distress. No hemoptysis. No history of asthma or RAD  GI: No nausea, vomiting, diarrhea or abdominal pain  : No dysuria, frequency or burning. No discharge  MS: No myalgia, muscle weakness, joint pain or back pain  Neuro: No headache or weakness. No LOC  Skin: No skin rash  Endo: no diabetes or thyroid dysfunction  Heme: no abnormal bleeding or clotting  Except as documented in the HPI, all other systems are negative

## 2019-01-28 NOTE — ED PROVIDER NOTE - NSFOLLOWUPINSTRUCTIONS_ED_ALL_ED_FT
Please take all medication as instructed and follow up with Dr. Patel of cardiology (or any other cardiologist) for management of your heart failure.  If you develop chest pain or shortness of breath, you should call your cardiologist or return to the ED.  If you notice leg swelling, you should take an extra dose of lasix (furosemide) and call your cardiologist.    IMPORTANT: If you cannot get a cardiology appointment (with Dr. Patel or any other cardiologist in that practice OR any cardiologist you would prefer), you should see Dr. Alexandre (or any primary care physician) or strongly consider coming back to the ED.  You need to have your blood pressure rechecked and have doctors carefully monitor your symptoms to make sure your heart failure is under control and not getting worse.    Congestive Heart Failure (CHF)    Congestive heart failure is a chronic condition in which the heart has trouble pumping blood. In some cases of heart failure, fluid may back up into your lungs or you may have swelling (edema) in your lower legs. There are many causes of heart failure including high blood pressure, coronary artery disease, abnormal heart valves, heart muscle disease, lung disease, diabetes, etc. Symptoms include shortness of breath with activity or when lying flat, cough, swelling of the legs, fatigue, or increased urination during the night.     Treatment is aimed at managing the symptoms of heart failure and may include lifestyle changes, medications, or surgical procedures. Take medicines only as directed by your health care provider and do not stop unless instructed to do so. Eat heart-healthy foods with low or no trans/saturated fats, cholesterol and salt. Weigh yourself every day for early recognition of fluid accumulation.    SEEK IMMEDIATE MEDICAL CARE IF YOU HAVE ANY OF THE FOLLOWING SYMPTOMS: shortness of breath, change in mental status, chest pain, lightheadedness/dizziness/fainting, or worsening of symptoms including not being able to conduct normal physical activity.

## 2019-03-01 ENCOUNTER — TRANSCRIPTION ENCOUNTER (OUTPATIENT)
Age: 44
End: 2019-03-01

## 2019-04-15 ENCOUNTER — EMERGENCY (EMERGENCY)
Facility: HOSPITAL | Age: 44
LOS: 0 days | Discharge: HOME | End: 2019-04-15
Attending: EMERGENCY MEDICINE | Admitting: EMERGENCY MEDICINE
Payer: MEDICAID

## 2019-04-15 VITALS
DIASTOLIC BLOOD PRESSURE: 76 MMHG | SYSTOLIC BLOOD PRESSURE: 127 MMHG | HEART RATE: 78 BPM | TEMPERATURE: 98 F | RESPIRATION RATE: 18 BRPM | OXYGEN SATURATION: 97 %

## 2019-04-15 VITALS
DIASTOLIC BLOOD PRESSURE: 90 MMHG | RESPIRATION RATE: 20 BRPM | HEART RATE: 87 BPM | TEMPERATURE: 98 F | OXYGEN SATURATION: 98 % | SYSTOLIC BLOOD PRESSURE: 144 MMHG

## 2019-04-15 DIAGNOSIS — I50.9 HEART FAILURE, UNSPECIFIED: ICD-10-CM

## 2019-04-15 DIAGNOSIS — R63.5 ABNORMAL WEIGHT GAIN: ICD-10-CM

## 2019-04-15 DIAGNOSIS — R06.02 SHORTNESS OF BREATH: ICD-10-CM

## 2019-04-15 DIAGNOSIS — I11.0 HYPERTENSIVE HEART DISEASE WITH HEART FAILURE: ICD-10-CM

## 2019-04-15 DIAGNOSIS — R07.89 OTHER CHEST PAIN: ICD-10-CM

## 2019-04-15 DIAGNOSIS — Z87.891 PERSONAL HISTORY OF NICOTINE DEPENDENCE: ICD-10-CM

## 2019-04-15 DIAGNOSIS — Z79.899 OTHER LONG TERM (CURRENT) DRUG THERAPY: ICD-10-CM

## 2019-04-15 DIAGNOSIS — R07.9 CHEST PAIN, UNSPECIFIED: ICD-10-CM

## 2019-04-15 PROBLEM — I10 ESSENTIAL (PRIMARY) HYPERTENSION: Chronic | Status: ACTIVE | Noted: 2019-01-28

## 2019-04-15 LAB
ALBUMIN SERPL ELPH-MCNC: 4.2 G/DL — SIGNIFICANT CHANGE UP (ref 3.5–5.2)
ALP SERPL-CCNC: 94 U/L — SIGNIFICANT CHANGE UP (ref 30–115)
ALT FLD-CCNC: 22 U/L — SIGNIFICANT CHANGE UP (ref 0–41)
ANION GAP SERPL CALC-SCNC: 12 MMOL/L — SIGNIFICANT CHANGE UP (ref 7–14)
AST SERPL-CCNC: 17 U/L — SIGNIFICANT CHANGE UP (ref 0–41)
BASOPHILS # BLD AUTO: 0.07 K/UL — SIGNIFICANT CHANGE UP (ref 0–0.2)
BASOPHILS NFR BLD AUTO: 0.9 % — SIGNIFICANT CHANGE UP (ref 0–1)
BILIRUB SERPL-MCNC: 0.4 MG/DL — SIGNIFICANT CHANGE UP (ref 0.2–1.2)
BUN SERPL-MCNC: 16 MG/DL — SIGNIFICANT CHANGE UP (ref 10–20)
CALCIUM SERPL-MCNC: 9.9 MG/DL — SIGNIFICANT CHANGE UP (ref 8.5–10.1)
CHLORIDE SERPL-SCNC: 102 MMOL/L — SIGNIFICANT CHANGE UP (ref 98–110)
CO2 SERPL-SCNC: 26 MMOL/L — SIGNIFICANT CHANGE UP (ref 17–32)
CREAT SERPL-MCNC: 0.9 MG/DL — SIGNIFICANT CHANGE UP (ref 0.7–1.5)
EOSINOPHIL # BLD AUTO: 0.17 K/UL — SIGNIFICANT CHANGE UP (ref 0–0.7)
EOSINOPHIL NFR BLD AUTO: 2.3 % — SIGNIFICANT CHANGE UP (ref 0–8)
GLUCOSE SERPL-MCNC: 191 MG/DL — HIGH (ref 70–99)
HCT VFR BLD CALC: 46.7 % — SIGNIFICANT CHANGE UP (ref 42–52)
HGB BLD-MCNC: 15.3 G/DL — SIGNIFICANT CHANGE UP (ref 14–18)
IMM GRANULOCYTES NFR BLD AUTO: 0.4 % — HIGH (ref 0.1–0.3)
LYMPHOCYTES # BLD AUTO: 2.47 K/UL — SIGNIFICANT CHANGE UP (ref 1.2–3.4)
LYMPHOCYTES # BLD AUTO: 33.5 % — SIGNIFICANT CHANGE UP (ref 20.5–51.1)
MCHC RBC-ENTMCNC: 27.9 PG — SIGNIFICANT CHANGE UP (ref 27–31)
MCHC RBC-ENTMCNC: 32.8 G/DL — SIGNIFICANT CHANGE UP (ref 32–37)
MCV RBC AUTO: 85.2 FL — SIGNIFICANT CHANGE UP (ref 80–94)
MONOCYTES # BLD AUTO: 0.78 K/UL — HIGH (ref 0.1–0.6)
MONOCYTES NFR BLD AUTO: 10.6 % — HIGH (ref 1.7–9.3)
NEUTROPHILS # BLD AUTO: 3.86 K/UL — SIGNIFICANT CHANGE UP (ref 1.4–6.5)
NEUTROPHILS NFR BLD AUTO: 52.3 % — SIGNIFICANT CHANGE UP (ref 42.2–75.2)
NRBC # BLD: 0 /100 WBCS — SIGNIFICANT CHANGE UP (ref 0–0)
NT-PROBNP SERPL-SCNC: 42 PG/ML — SIGNIFICANT CHANGE UP (ref 0–300)
PLATELET # BLD AUTO: 198 K/UL — SIGNIFICANT CHANGE UP (ref 130–400)
POTASSIUM SERPL-MCNC: 4.1 MMOL/L — SIGNIFICANT CHANGE UP (ref 3.5–5)
POTASSIUM SERPL-SCNC: 4.1 MMOL/L — SIGNIFICANT CHANGE UP (ref 3.5–5)
PROT SERPL-MCNC: 7.2 G/DL — SIGNIFICANT CHANGE UP (ref 6–8)
RBC # BLD: 5.48 M/UL — SIGNIFICANT CHANGE UP (ref 4.7–6.1)
RBC # FLD: 13.1 % — SIGNIFICANT CHANGE UP (ref 11.5–14.5)
SODIUM SERPL-SCNC: 140 MMOL/L — SIGNIFICANT CHANGE UP (ref 135–146)
TROPONIN T SERPL-MCNC: <0.01 NG/ML — SIGNIFICANT CHANGE UP
WBC # BLD: 7.38 K/UL — SIGNIFICANT CHANGE UP (ref 4.8–10.8)
WBC # FLD AUTO: 7.38 K/UL — SIGNIFICANT CHANGE UP (ref 4.8–10.8)

## 2019-04-15 PROCEDURE — 99284 EMERGENCY DEPT VISIT MOD MDM: CPT

## 2019-04-15 PROCEDURE — 71046 X-RAY EXAM CHEST 2 VIEWS: CPT | Mod: 26

## 2019-04-15 NOTE — ED PROVIDER NOTE - PHYSICAL EXAMINATION
Constitutional: Well developed, well nourished. NAD. Good general hygiene  Head: Atraumatic.  Eyes: EOMI without discomfort.   ENT: No nasal discharge. Mucous membranes moist.  Neck: Supple. Painless ROM.  Cardiovascular: Regular rhythm. Regular rate. Normal S1 and S2. No murmurs. 2+ pulses in all extremities.   Pulmonary: Normal respiratory rate and effort. Lungs clear to auscultation bilaterally. No wheezing, rales, or rhonchi. Bilateral, equal lung expansion.   Abdominal: Soft. Nondistended. Nontender. No rebound or guarding.   Extremities: Pelvis stable. No lower extremity edema. Symmetric calves.  Skin: No rashes.   Neuro: AAOx3. No focal neurological deficits.  Psych: Normal mood. Normal affect.

## 2019-04-15 NOTE — ED PROVIDER NOTE - CARE PROVIDER_API CALL
Avelino Francisco)  Cardiovascular Disease; Internal Medicine  [address] [place] [address], [place] [address]  Phone: [phone]  Fax: [phone]  Follow Up Time: 4-6 Days    Nathaniel Sales)  Critical Care Medicine; Internal Medicine; Pulmonary Disease; Sleep Medicine  [address] [place] [address], [place] [address]  Phone: [phone]  Fax: [phone]  Follow Up Time: 4-6 Days

## 2019-04-15 NOTE — ED PROVIDER NOTE - PROGRESS NOTE DETAILS
ATTENDING NOTE:   43 y/o M with PMH of CHF, managed by Dr. Stephen, presenting with suddenly onset SOB at 3 am which has resolved. Also notes he noticed that feels like he gained weight over the last 2 weeks though cannot specify if legs are more swollen or if abdomen is more swollen or how much weight was gained. Currently not feeling SOB. Pt came to ED because he was concern regarding his CHF. Exam: NAD, NCAT, HEENT: mmm, EOMI, PERRLA, Neck: supple, nontender, nl ROM, Heart: RRR, no murmur, Extremities 2+ b/l pulses intact. Lungs: (+) faint bibasilar crackles present on deep inspiration.  no signs of increased WOB, Abd: NTND, no guarding or rebound, no hernia palpated, no CVAT. MSK: chest, back, and ext nontender, nl rom, no deformity. (+) b/l LE trace pitting edema to shins.  Neuro: A&Ox3, normal strength, nl sensation throughout, normal speech. A/P: Will eval for ACS vs CHF exacerbation with episode over night. Possible acute pulmonary edema though less likely given spontaneous recovery. Pt should be referred to pulmonary should the work up be negative.

## 2019-04-15 NOTE — ED PROVIDER NOTE - NS ED ROS FT
Constitutional: No fever or chills. Normal appetite. No unintended weight loss.   Eyes: No vision changes.  ENT: No hearing changes. No ear pain. No sore throat.  Neck: No neck pain or stiffness.  Cardiovascular: No chest pain, palpitations, or edema.  Pulmonary: No cough. No hemoptysis.  Abdominal: No abdominal pain, nausea, vomiting, or diarrhea.   : No dysuria or frequency. No hematuria.   Neuro: No headache, syncope, or dizziness.  MS: No back pain. No calf pain/swelling.  Psych: No suicidal or homicidal ideations.

## 2019-04-15 NOTE — ED PROVIDER NOTE - OBJECTIVE STATEMENT
44y M w PMH CHF diagnosed in December w EF 20-25% presents for an episode of SOB that woke him from sleep at 0300. Associated with a feeling of chest tightness but no pain. No f/c/n/v/d. Symptoms have since resolved. Pt has been taking his medications as prescribed. Pt has had some weight gain(3-4 lbs) in the last few weeks, but no edema.   No abd pn.

## 2019-04-15 NOTE — ED PROVIDER NOTE - CLINICAL SUMMARY MEDICAL DECISION MAKING FREE TEXT BOX
PMH CHF, pw SOB episode overnight, now feeling normal. exam and labs with no acute process. Patient to be discharged from ED. Any available test results were discussed with patient and/or family. Verbal instructions given, including instructions to return to ED immediately for any new, worsening, or concerning symptoms. Patient endorsed understanding. Written discharge instructions additionally given, including follow-up plan with PMD and patient's cardiologist.

## 2019-09-10 ENCOUNTER — INPATIENT (INPATIENT)
Facility: HOSPITAL | Age: 44
LOS: 4 days | Discharge: HOME | End: 2019-09-15
Attending: INTERNAL MEDICINE | Admitting: INTERNAL MEDICINE
Payer: MEDICAID

## 2019-09-10 VITALS
HEART RATE: 89 BPM | RESPIRATION RATE: 20 BRPM | TEMPERATURE: 97 F | DIASTOLIC BLOOD PRESSURE: 95 MMHG | OXYGEN SATURATION: 98 % | SYSTOLIC BLOOD PRESSURE: 144 MMHG

## 2019-09-10 LAB
ALBUMIN SERPL ELPH-MCNC: 4.2 G/DL — SIGNIFICANT CHANGE UP (ref 3.5–5.2)
ALP SERPL-CCNC: 80 U/L — SIGNIFICANT CHANGE UP (ref 30–115)
ALT FLD-CCNC: 39 U/L — SIGNIFICANT CHANGE UP (ref 0–41)
ANION GAP SERPL CALC-SCNC: 13 MMOL/L — SIGNIFICANT CHANGE UP (ref 7–14)
AST SERPL-CCNC: 31 U/L — SIGNIFICANT CHANGE UP (ref 0–41)
BILIRUB SERPL-MCNC: 0.5 MG/DL — SIGNIFICANT CHANGE UP (ref 0.2–1.2)
BUN SERPL-MCNC: 18 MG/DL — SIGNIFICANT CHANGE UP (ref 10–20)
CALCIUM SERPL-MCNC: 9.4 MG/DL — SIGNIFICANT CHANGE UP (ref 8.5–10.1)
CHLORIDE SERPL-SCNC: 106 MMOL/L — SIGNIFICANT CHANGE UP (ref 98–110)
CO2 SERPL-SCNC: 29 MMOL/L — SIGNIFICANT CHANGE UP (ref 17–32)
CREAT SERPL-MCNC: 1.2 MG/DL — SIGNIFICANT CHANGE UP (ref 0.7–1.5)
GLUCOSE SERPL-MCNC: 154 MG/DL — HIGH (ref 70–99)
HCT VFR BLD CALC: 45.1 % — SIGNIFICANT CHANGE UP (ref 42–52)
HGB BLD-MCNC: 14.5 G/DL — SIGNIFICANT CHANGE UP (ref 14–18)
MCHC RBC-ENTMCNC: 28.1 PG — SIGNIFICANT CHANGE UP (ref 27–31)
MCHC RBC-ENTMCNC: 32.2 G/DL — SIGNIFICANT CHANGE UP (ref 32–37)
MCV RBC AUTO: 87.4 FL — SIGNIFICANT CHANGE UP (ref 80–94)
NRBC # BLD: 0 /100 WBCS — SIGNIFICANT CHANGE UP (ref 0–0)
NT-PROBNP SERPL-SCNC: 124 PG/ML — SIGNIFICANT CHANGE UP (ref 0–300)
PLATELET # BLD AUTO: 215 K/UL — SIGNIFICANT CHANGE UP (ref 130–400)
POTASSIUM SERPL-MCNC: 4.2 MMOL/L — SIGNIFICANT CHANGE UP (ref 3.5–5)
POTASSIUM SERPL-SCNC: 4.2 MMOL/L — SIGNIFICANT CHANGE UP (ref 3.5–5)
PROT SERPL-MCNC: 7 G/DL — SIGNIFICANT CHANGE UP (ref 6–8)
RBC # BLD: 5.16 M/UL — SIGNIFICANT CHANGE UP (ref 4.7–6.1)
RBC # FLD: 13.6 % — SIGNIFICANT CHANGE UP (ref 11.5–14.5)
SODIUM SERPL-SCNC: 148 MMOL/L — HIGH (ref 135–146)
TROPONIN T SERPL-MCNC: <0.01 NG/ML — SIGNIFICANT CHANGE UP
WBC # BLD: 7.01 K/UL — SIGNIFICANT CHANGE UP (ref 4.8–10.8)
WBC # FLD AUTO: 7.01 K/UL — SIGNIFICANT CHANGE UP (ref 4.8–10.8)

## 2019-09-10 PROCEDURE — 71275 CT ANGIOGRAPHY CHEST: CPT | Mod: 26

## 2019-09-10 PROCEDURE — 93010 ELECTROCARDIOGRAM REPORT: CPT

## 2019-09-10 PROCEDURE — 71045 X-RAY EXAM CHEST 1 VIEW: CPT | Mod: 26

## 2019-09-10 PROCEDURE — 99285 EMERGENCY DEPT VISIT HI MDM: CPT

## 2019-09-10 PROCEDURE — 93970 EXTREMITY STUDY: CPT | Mod: 26

## 2019-09-10 NOTE — ED PROVIDER NOTE - CARE PLAN
Principal Discharge DX:	Chest pain  Secondary Diagnosis:	Shortness of breath  Secondary Diagnosis:	Leg pain

## 2019-09-10 NOTE — ED PROVIDER NOTE - OBJECTIVE STATEMENT
43yo M with PMH of HTN and CHF with reduced EF 20-25% presenting to ED with increased SOB and chest pain x 2 hours. Patient describes the CP as substernal, constant, pressure, non-radiating, moderate, and has it even at rest. SOB with rest and increases with exertion. Denies any HA, vision changes, cough, f/c, abd pain, n/v/d, diaphoresis, injuries/traumas/falls, or hx of DVT/PE. Patient had a cath 12/19 that did not show CAD. Cards- [facility]urn. Patient takes Lasix 40mg BID and Entresto. Has also noticed increased BLE edema, R greater than left, and has had pain in lower R foot. No recent surgeries or long travel.

## 2019-09-10 NOTE — ED PROVIDER NOTE - NS ED ROS FT
Constitutional:  No fevers or chills.  Eyes:  No visual changes, eye pain, or discharge.  ENT:  No hearing changes. No sore throat.  Neck:  No neck pain.  Cardiac:  +CP and +BLE edema.  Resp:  No cough or SOB.   GI:  No nausea, vomiting, diarrhea, or abdominal pain.  :  No dysuria, frequency, or hematuria.  MSK:  No myalgias.  Neuro:  No headache, dizziness, or weakness.  Skin:  No skin rash.

## 2019-09-10 NOTE — ED PROVIDER NOTE - CLINICAL SUMMARY MEDICAL DECISION MAKING FREE TEXT BOX
le duplex neg for dvt.  chest CT neg for pe. trop neg.  pt admitted to tele for further cardiac eval.

## 2019-09-10 NOTE — ED ADULT NURSE REASSESSMENT NOTE - NS ED NURSE REASSESS COMMENT FT1
received pt from day shift RN , pt  denies any chest pain as of this time , no labored breathing , denies nausea , no vomiting

## 2019-09-10 NOTE — ED PROVIDER NOTE - CADM POA PRESS ULCER
Pt presents to the IC with c/o ankle pain s/p missing the last stair while going down the stairs last week. +bruising noted. Pt is able to ambulate with a steady gait. Pain wakes the patient up at night. No

## 2019-09-10 NOTE — ED PROVIDER NOTE - ATTENDING CONTRIBUTION TO CARE
45 y/o male with h/o HTN, CHF, in ER with c/o ~ 1 week h/o intermittent SOB and CP. no cough.  no f/c. no abd pain.  no ha/dizziness/loc.  also c/o pain/swelling to RLE. no recent travel.  no trauma.  no n/v/d.  Pt follows with Dr. Patel for card, had cardiac cath last year which did not show any obstructive lesions, echo 12/2018 showed CHF with EF 20-25%, pt states he had a f/u echo with Dr. Patel ~ 3 months ago which showed an improved EF.   PE - nad, nc/at, eomi, perrl, op - clear, cta b/l, no w/r/r, rrr, abd- soft, nt/nd, nabs, from x 4, + RLE swelling/tenderness, 2+ dp pulse, A&O x 3, no focal neuro deficits.  -cardiac w/u, LE duplex, CTA chest for r/o PE.

## 2019-09-10 NOTE — ED PROVIDER NOTE - PHYSICAL EXAMINATION
PHYSICAL EXAM: I have reviewed current vital signs.  GENERAL: NAD, well-nourished; well-developed.  HEAD:  Normocephalic, atraumatic.  EYES: Conjunctiva and sclera clear.  ENT: MMM.  NECK: Supple, full ROM.  CHEST/LUNG: Clear to auscultation bilaterally; no wheezes, rales, or rhonchi.  HEART: Regular rate and rhythm, normal S1 and S2; no murmurs, rubs, or gallops.  ABDOMEN: Soft, nontender, nondistended.  EXTREMITIES:  2+ peripheral pulses; +severe BLE edema, R greater than L.  PSYCH: Cooperative, appropriate, normal mood and affect.  NEUROLOGY: A&O x 3. Motor 5/5. No focal neurological deficits.   SKIN: Warm and dry.

## 2019-09-11 ENCOUNTER — TRANSCRIPTION ENCOUNTER (OUTPATIENT)
Age: 44
End: 2019-09-11

## 2019-09-11 LAB
ANION GAP SERPL CALC-SCNC: 11 MMOL/L — SIGNIFICANT CHANGE UP (ref 7–14)
BASOPHILS # BLD AUTO: 0.06 K/UL — SIGNIFICANT CHANGE UP (ref 0–0.2)
BASOPHILS NFR BLD AUTO: 0.9 % — SIGNIFICANT CHANGE UP (ref 0–1)
BUN SERPL-MCNC: 19 MG/DL — SIGNIFICANT CHANGE UP (ref 10–20)
CALCIUM SERPL-MCNC: 8.9 MG/DL — SIGNIFICANT CHANGE UP (ref 8.5–10.1)
CHLORIDE SERPL-SCNC: 104 MMOL/L — SIGNIFICANT CHANGE UP (ref 98–110)
CHOLEST SERPL-MCNC: 134 MG/DL — SIGNIFICANT CHANGE UP (ref 100–200)
CK MB CFR SERPL CALC: 2.4 NG/ML — SIGNIFICANT CHANGE UP (ref 0.6–6.3)
CK SERPL-CCNC: 157 U/L — SIGNIFICANT CHANGE UP (ref 0–225)
CO2 SERPL-SCNC: 26 MMOL/L — SIGNIFICANT CHANGE UP (ref 17–32)
CREAT SERPL-MCNC: 1 MG/DL — SIGNIFICANT CHANGE UP (ref 0.7–1.5)
EOSINOPHIL # BLD AUTO: 0.2 K/UL — SIGNIFICANT CHANGE UP (ref 0–0.7)
EOSINOPHIL NFR BLD AUTO: 2.9 % — SIGNIFICANT CHANGE UP (ref 0–8)
ESTIMATED AVERAGE GLUCOSE: 154 MG/DL — HIGH (ref 68–114)
GLUCOSE SERPL-MCNC: 95 MG/DL — SIGNIFICANT CHANGE UP (ref 70–99)
HBA1C BLD-MCNC: 7 % — HIGH (ref 4–5.6)
HCT VFR BLD CALC: 42.9 % — SIGNIFICANT CHANGE UP (ref 42–52)
HDLC SERPL-MCNC: 36 MG/DL — LOW
HGB BLD-MCNC: 14.2 G/DL — SIGNIFICANT CHANGE UP (ref 14–18)
IMM GRANULOCYTES NFR BLD AUTO: 0.3 % — SIGNIFICANT CHANGE UP (ref 0.1–0.3)
LACTATE SERPL-SCNC: 1.2 MMOL/L — SIGNIFICANT CHANGE UP (ref 0.5–2.2)
LIPID PNL WITH DIRECT LDL SERPL: 84 MG/DL — SIGNIFICANT CHANGE UP (ref 4–129)
LYMPHOCYTES # BLD AUTO: 2.46 K/UL — SIGNIFICANT CHANGE UP (ref 1.2–3.4)
LYMPHOCYTES # BLD AUTO: 36 % — SIGNIFICANT CHANGE UP (ref 20.5–51.1)
MAGNESIUM SERPL-MCNC: 2 MG/DL — SIGNIFICANT CHANGE UP (ref 1.8–2.4)
MCHC RBC-ENTMCNC: 28.5 PG — SIGNIFICANT CHANGE UP (ref 27–31)
MCHC RBC-ENTMCNC: 33.1 G/DL — SIGNIFICANT CHANGE UP (ref 32–37)
MCV RBC AUTO: 86 FL — SIGNIFICANT CHANGE UP (ref 80–94)
MONOCYTES # BLD AUTO: 0.88 K/UL — HIGH (ref 0.1–0.6)
MONOCYTES NFR BLD AUTO: 12.9 % — HIGH (ref 1.7–9.3)
NEUTROPHILS # BLD AUTO: 3.21 K/UL — SIGNIFICANT CHANGE UP (ref 1.4–6.5)
NEUTROPHILS NFR BLD AUTO: 47 % — SIGNIFICANT CHANGE UP (ref 42.2–75.2)
NRBC # BLD: 0 /100 WBCS — SIGNIFICANT CHANGE UP (ref 0–0)
PHOSPHATE SERPL-MCNC: 4.3 MG/DL — SIGNIFICANT CHANGE UP (ref 2.1–4.9)
PLATELET # BLD AUTO: 191 K/UL — SIGNIFICANT CHANGE UP (ref 130–400)
POTASSIUM SERPL-MCNC: 3.9 MMOL/L — SIGNIFICANT CHANGE UP (ref 3.5–5)
POTASSIUM SERPL-SCNC: 3.9 MMOL/L — SIGNIFICANT CHANGE UP (ref 3.5–5)
RBC # BLD: 4.99 M/UL — SIGNIFICANT CHANGE UP (ref 4.7–6.1)
RBC # FLD: 13.6 % — SIGNIFICANT CHANGE UP (ref 11.5–14.5)
SODIUM SERPL-SCNC: 141 MMOL/L — SIGNIFICANT CHANGE UP (ref 135–146)
TOTAL CHOLESTEROL/HDL RATIO MEASUREMENT: 3.7 RATIO — LOW (ref 4–5.5)
TRIGL SERPL-MCNC: 101 MG/DL — SIGNIFICANT CHANGE UP (ref 10–149)
TROPONIN T SERPL-MCNC: <0.01 NG/ML — SIGNIFICANT CHANGE UP
WBC # BLD: 6.83 K/UL — SIGNIFICANT CHANGE UP (ref 4.8–10.8)
WBC # FLD AUTO: 6.83 K/UL — SIGNIFICANT CHANGE UP (ref 4.8–10.8)

## 2019-09-11 PROCEDURE — 71101 X-RAY EXAM UNILAT RIBS/CHEST: CPT | Mod: 26,LT

## 2019-09-11 PROCEDURE — 73630 X-RAY EXAM OF FOOT: CPT | Mod: 26,RT

## 2019-09-11 PROCEDURE — 93306 TTE W/DOPPLER COMPLETE: CPT | Mod: 26

## 2019-09-11 PROCEDURE — 99222 1ST HOSP IP/OBS MODERATE 55: CPT

## 2019-09-11 PROCEDURE — 73610 X-RAY EXAM OF ANKLE: CPT | Mod: 26,RT

## 2019-09-11 PROCEDURE — 99223 1ST HOSP IP/OBS HIGH 75: CPT

## 2019-09-11 RX ORDER — ASPIRIN/CALCIUM CARB/MAGNESIUM 324 MG
1 TABLET ORAL
Qty: 30 | Refills: 0
Start: 2019-09-11 | End: 2019-10-10

## 2019-09-11 RX ORDER — INFLUENZA VIRUS VACCINE 15; 15; 15; 15 UG/.5ML; UG/.5ML; UG/.5ML; UG/.5ML
0.5 SUSPENSION INTRAMUSCULAR ONCE
Refills: 0 | Status: COMPLETED | OUTPATIENT
Start: 2019-09-11 | End: 2019-09-11

## 2019-09-11 RX ORDER — FUROSEMIDE 40 MG
40 TABLET ORAL ONCE
Refills: 0 | Status: COMPLETED | OUTPATIENT
Start: 2019-09-11 | End: 2019-09-11

## 2019-09-11 RX ORDER — ASPIRIN/CALCIUM CARB/MAGNESIUM 324 MG
81 TABLET ORAL DAILY
Refills: 0 | Status: DISCONTINUED | OUTPATIENT
Start: 2019-09-11 | End: 2019-09-15

## 2019-09-11 RX ORDER — FUROSEMIDE 40 MG
40 TABLET ORAL
Refills: 0 | Status: DISCONTINUED | OUTPATIENT
Start: 2019-09-11 | End: 2019-09-15

## 2019-09-11 RX ORDER — IBUPROFEN 200 MG
600 TABLET ORAL THREE TIMES A DAY
Refills: 0 | Status: DISCONTINUED | OUTPATIENT
Start: 2019-09-11 | End: 2019-09-12

## 2019-09-11 RX ORDER — SACUBITRIL AND VALSARTAN 24; 26 MG/1; MG/1
1 TABLET, FILM COATED ORAL
Refills: 0 | Status: DISCONTINUED | OUTPATIENT
Start: 2019-09-11 | End: 2019-09-15

## 2019-09-11 RX ORDER — CARVEDILOL PHOSPHATE 80 MG/1
3.12 CAPSULE, EXTENDED RELEASE ORAL EVERY 12 HOURS
Refills: 0 | Status: DISCONTINUED | OUTPATIENT
Start: 2019-09-11 | End: 2019-09-15

## 2019-09-11 RX ORDER — ENOXAPARIN SODIUM 100 MG/ML
40 INJECTION SUBCUTANEOUS AT BEDTIME
Refills: 0 | Status: DISCONTINUED | OUTPATIENT
Start: 2019-09-11 | End: 2019-09-13

## 2019-09-11 RX ADMIN — SACUBITRIL AND VALSARTAN 1 TABLET(S): 24; 26 TABLET, FILM COATED ORAL at 06:27

## 2019-09-11 RX ADMIN — Medication 81 MILLIGRAM(S): at 11:09

## 2019-09-11 RX ADMIN — CARVEDILOL PHOSPHATE 3.12 MILLIGRAM(S): 80 CAPSULE, EXTENDED RELEASE ORAL at 06:27

## 2019-09-11 RX ADMIN — CARVEDILOL PHOSPHATE 3.12 MILLIGRAM(S): 80 CAPSULE, EXTENDED RELEASE ORAL at 17:08

## 2019-09-11 RX ADMIN — Medication 40 MILLIGRAM(S): at 06:27

## 2019-09-11 RX ADMIN — Medication 40 MILLIGRAM(S): at 15:27

## 2019-09-11 RX ADMIN — ENOXAPARIN SODIUM 40 MILLIGRAM(S): 100 INJECTION SUBCUTANEOUS at 21:45

## 2019-09-11 RX ADMIN — SACUBITRIL AND VALSARTAN 1 TABLET(S): 24; 26 TABLET, FILM COATED ORAL at 17:08

## 2019-09-11 NOTE — CONSULT NOTE ADULT - ATTENDING COMMENTS
nonischemic cardiomyopathy  EF remains depressed despite GDMT   mostly compliant - ran out of entresto last week  Recommend CMR  Recommend SQ ICD - risks benefits discussed with patient

## 2019-09-11 NOTE — DISCHARGE NOTE PROVIDER - CARE PROVIDER_API CALL
Fahad Logan)  Critical Care Medicine; Pulmonary Disease; Sleep Medicine  [address] [place] [address], [place] [address]  Phone: [phone]  Fax: [phone]  Follow Up Time:     Avelino Frnacisco)  Cardiovascular Disease; Internal Medicine  [address] [place] [address], [place] [address]  Phone: [phone]  Fax: [phone]  Follow Up Time: Fahad Logan)  Critical Care Medicine; Pulmonary Disease; Sleep Medicine  [address] [place] [address], [place] [address]  Phone: [phone]  Fax: [phone]  Follow Up Time:     Avelino Francisco)  Cardiovascular Disease; Internal Medicine  [address] [place] [address], [place] [address]  Phone: [phone]  Fax: [phone]  Follow Up Time:     Danya Lawrence)  Cardiovascular Disease; Internal Medicine  [address] [place] [address]  Phone: [phone]  Fax: [phone]  Follow Up Time:

## 2019-09-11 NOTE — DISCHARGE NOTE PROVIDER - HOSPITAL COURSE
43 yo M w/ PMH of HTN and non ischemic cardiomyopathy HFrEF (EF 20-25%) presents to ED with increased SOB and chest pain x 2 hours. SOB with rest and increases with exertion.. Recent travel from [place] and ankle pain, acs ruled out for chest pain, Ct chest negative for PE - not currently feeling SOB - resolved. Pt has had repeat Echo as outpatietn for his NICM - sp cath negative in past, spoke with Cardio Dr tatum on phone - repeat echo in office EF was 40%.     pt will be scheduled for cardiac MRI for etiology    will repeat echo if no change in EF patient stable to DC and have op f/u -     DVT sono negative.     Pt referred to Pulm needs outpt sleep study for Lucy eval - has pulmonary htn 43 yo M w/ PMH of HTN and non ischemic cardiomyopathy HFrEF (EF 20-25%) presents to ED with increased SOB and chest pain x 2 hours. SOB with rest and increases with exertion.. Recent travel from [place] and ankle pain, acs ruled out for chest pain, Ct chest negative for PE - not currently feeling SOB - resolved. Pt has had repeat Echo as outpatietn for his NICM - sp cath negative in past, spoke with Cardio Dr tatum on phone - repeat echo in office EF was 40%. Patient underwent a cardiac MRI and ICD placement. TOlerated the procedure well with no complications. He is seen by EP today and cleared for discharge home with 5 day course of Keflex. Patient aware and agreeable with plan        PHYSICAL EXAM:    GENERAL: NAD, speaks in full sentences, no signs of respiratory distress    HEAD:  Atraumatic, Normocephalic    EYES: Econjunctiva and sclera clear    NECK: Supple, No JVD    CHEST/LUNG: Clear to auscultation bilaterally; No wheeze; Insertion site clean, small echymosis, dressing dry/intact    HEART: Regular rate and rhythm; No murmurs;     ABDOMEN: Soft, Nontender, Nondistended; Bowel sounds present;      EXTREMITIES:  2+ Peripheral Pulses, No cyanosis or edema    PSYCH: AAOx3    NEUROLOGY: non-focal    SKIN: No rashes or lesions        Patient is medically stable for discharge home to follow up with Dr Lawrence in 3-4 weeks. Patient in agreement with plan

## 2019-09-11 NOTE — CONSULT NOTE ADULT - SUBJECTIVE AND OBJECTIVE BOX
Patient is a 44y old  Male who presents with a chief complaint of CP (11 Sep 2019 14:20)    HPI:  Pt is a 45 yo M w/ PMH of HTN and NICM who presented to ED with pain and swelling in his right ankle.  He also reports he wakes up in the middle of the night feeling startled because he can't breath.  Pt states he had two episodes of CP associated as well this month. Pt describes the CP as substernal, constant, pressure, non-radiating, moderate, and has it even at rest. He endorses SOB with rest and increases with exertion (which is chronic and unchanged). Currently, pt denies any HA, vision changes, cough, f/c, abd pain, n/v/d, diaphoresis, injuries/traumas/falls, or hx of DVT/PE.     Pt was admitted in 2018 for acute onset of SOB/orthopnea.  He was diagnosed with NICM with EF 20-25% at that time.  He had a cardiac cath in  which did not show CAD.  Pt was started on GDMT in December.  He was seen by EP at that time for a life vest but due to insurance issues, he did not get a life vest.  He had a repeat echo in 2019 which showed EF recovered to 40%.  Over the summer, pt went to [place] and did not have all of his meds so he was not able to take Entresto for a month or 2.    Pt came to the ED today for the above symptoms.  Repeat TTE shows EF 30-35%.  Cardiology is recommending ICD placement for primary prevention.  Pt denies history of Tobacco use, ETOH use, drug use.      Of note, pt states he snores at night and feels like he stops breathing.      PAST MEDICAL & SURGICAL HISTORY:  HTN (hypertension)  CHF (congestive heart failure): EF of 20-25%  No significant past surgical history      PREVIOUS DIAGNOSTIC TESTING:      ECHO   Transthoracic Echocardiogram (19 @ 12:15) >   1. Left ventricular ejection fraction, by visual estimation, is 30 to   35%.   2. Spectral Doppler shows pseudonormal pattern of left ventricular   myocardial filling (Grade II diastolic dysfunction).   3. Mild mitral valve regurgitation.   4. Mild aortic regurgitation.   5. Dilatation of the aortic root and ascending aorta.      CATHETERIZATION  FINDINGS:  Cardiac Cath Lab - Adult (12.21.18 @ 15:07) >  IMPRESSIONS:    1. No angiographically significant CAD.    2. Dilated AorticRoot.    3. Mild Aortic Regurgitation.        MEDICATIONS  (STANDING):  aspirin enteric coated 81 milliGRAM(s) Oral daily  carvedilol 3.125 milliGRAM(s) Oral every 12 hours  enoxaparin Injectable 40 milliGRAM(s) SubCutaneous at bedtime  furosemide    Tablet 40 milliGRAM(s) Oral two times a day  furosemide   Injectable 40 milliGRAM(s) IV Push once  influenza   Vaccine 0.5 milliLiter(s) IntraMuscular once  sacubitril 24 mG/valsartan 26 mG 1 Tablet(s) Oral two times a day    MEDICATIONS  (PRN):  ibuprofen  Tablet. 600 milliGRAM(s) Oral three times a day PRN Severe Pain (7 - 10)      FAMILY HISTORY:  Family history of heart disease (Father)  Family history of diabetes mellitus (Mother)      SOCIAL HISTORY    CIGARETTES: denies    ALCOHOL: denies    Past Surgical History:    Allergies:    No Known Allergies      REVIEW OF SYSTEMS:  + orthopnea/PND    Vital Signs Last 24 Hrs  T(C): 36.1 (11 Sep 2019 14:21), Max: 36.6 (10 Sep 2019 20:00)  T(F): 96.9 (11 Sep 2019 14:21), Max: 97.8 (10 Sep 2019 20:00)  HR: 77 (11 Sep 2019 15:25) (73 - 95)  BP: 133/86 (11 Sep 2019 15:25) (130/69 - 157/94)  BP(mean): --  RR: 20 (11 Sep 2019 14:21) (18 - 20)  SpO2: 96% (11 Sep 2019 08:12) (96% - 98%)    PHYSICAL EXAM:    GENERAL: In no apparent distress, well nourished, and hydrated.  NECK: Supple   HEART: Regular rate and rhythm; No murmurs, rubs, or gallops.  PULMONARY: Clear to auscultation and perfusion.    ABDOMEN: Soft, Nontender, Nondistended; Bowel sounds present  EXTREMITIES:  2+ Peripheral Pulses, trace edema  NEUROLOGICAL: Grossly nonfocal      INTERPRETATION OF TELEMETRY:    ECG: < from: 12 Lead ECG (09.10.19 @ 15:42) >  Ventricular Rate 92 BPM    Atrial Rate 92 BPM    P-R Interval 202 ms    QRS Duration 96 ms    Q-T Interval 384 ms    QTC Calculation(Bezet) 474 ms    P Axis 42 degrees    R Axis -20 degrees    T Axis 72 degrees    Diagnosis Line Sinus rhythm with occasional Premature ventricular complexes  Anterior infarct , age undetermined  Abnormal ECG    < end of copied text >      I&O's Detail    10 Sep 2019 07:  -  11 Sep 2019 07:00  --------------------------------------------------------  IN:    Oral Fluid: 300 mL  Total IN: 300 mL    OUT:    Voided: 500 mL  Total OUT: 500 mL    Total NET: -200 mL          LABS:                        14.2   6.83  )-----------( 191      ( 11 Sep 2019 06:22 )             42.9         141  |  104  |  19  ----------------------------<  95  3.9   |  26  |  1.0    Ca    8.9      11 Sep 2019 06:22  Phos  4.3       Mg     2.0         TPro  7.0  /  Alb  4.2  /  TBili  0.5  /  DBili  x   /  AST  31  /  ALT  39  /  AlkPhos  80  0910    CARDIAC MARKERS ( 11 Sep 2019 06:22 )  x     / <0.01 ng/mL / 157 U/L / x     / 2.4 ng/mL  CARDIAC MARKERS ( 10 Sep 2019 17:00 )  x     / <0.01 ng/mL / x     / x     / x              BNPSerum Pro-Brain Natriuretic Peptide: 124 pg/mL (09-10 @ 17:00)    I&O's Detail    10 Sep 2019 07:  -  11 Sep 2019 07:00  --------------------------------------------------------  IN:    Oral Fluid: 300 mL  Total IN: 300 mL    OUT:    Voided: 500 mL  Total OUT: 500 mL    Total NET: -200 mL        Daily Height in cm: 180.34 (11 Sep 2019 01:41)    Daily Weight in k (11 Sep 2019 05:31)    RADIOLOGY & ADDITIONAL STUDIES: Patient is a 44y old  Male who presents with a chief complaint of CP (11 Sep 2019 14:20)    HPI:  Pt is a 45 yo M w/ PMH of HTN and NICM who presented to ED with pain and swelling in his right ankle.  He also reports he wakes up in the middle of the night feeling startled because he can't breath.  Pt states he had two episodes of CP associated as well this month. Pt describes the CP as substernal, constant, pressure, non-radiating, moderate, and has it even at rest. He endorses SOB with rest and increases with exertion (which is chronic and unchanged). Currently, pt denies any HA, vision changes, cough, f/c, abd pain, n/v/d, diaphoresis, injuries/traumas/falls, or hx of DVT/PE.     Pt was admitted in 2018 for acute onset of SOB/orthopnea.  He was diagnosed with NICM with EF 20-25% at that time.  He had a cardiac cath in  which did not show CAD.  Pt was started on GDMT in December.  He was seen by EP at that time for a life vest but due to insurance issues, he did not get a life vest.  He had a repeat echo in 2019 which showed EF recovered to 40%.  Over the summer, pt went to his [place] country [place] and did not have all of his meds so he was not able to take Entresto for a month or 2.    Pt came to the ED today for the above symptoms.  Repeat TTE shows EF 30-35%.  Cardiology is recommending ICD placement for primary prevention.  Pt denies history of Tobacco use, ETOH use, drug use.      Of note, pt states he snores at night and feels like he stops breathing.      PAST MEDICAL & SURGICAL HISTORY:  HTN (hypertension)  CHF (congestive heart failure): EF of 20-25%  No significant past surgical history      PREVIOUS DIAGNOSTIC TESTING:      ECHO   Transthoracic Echocardiogram (19 @ 12:15) >   1. Left ventricular ejection fraction, by visual estimation, is 30 to   35%.   2. Spectral Doppler shows pseudonormal pattern of left ventricular   myocardial filling (Grade II diastolic dysfunction).   3. Mild mitral valve regurgitation.   4. Mild aortic regurgitation.   5. Dilatation of the aortic root and ascending aorta.      CATHETERIZATION  FINDINGS:  Cardiac Cath Lab - Adult (12.21.18 @ 15:07) >  IMPRESSIONS:    1. No angiographically significant CAD.    2. Dilated AorticRoot.    3. Mild Aortic Regurgitation.        MEDICATIONS  (STANDING):  aspirin enteric coated 81 milliGRAM(s) Oral daily  carvedilol 3.125 milliGRAM(s) Oral every 12 hours  enoxaparin Injectable 40 milliGRAM(s) SubCutaneous at bedtime  furosemide    Tablet 40 milliGRAM(s) Oral two times a day  furosemide   Injectable 40 milliGRAM(s) IV Push once  influenza   Vaccine 0.5 milliLiter(s) IntraMuscular once  sacubitril 24 mG/valsartan 26 mG 1 Tablet(s) Oral two times a day    MEDICATIONS  (PRN):  ibuprofen  Tablet. 600 milliGRAM(s) Oral three times a day PRN Severe Pain (7 - 10)      FAMILY HISTORY:  Family history of heart disease (Father)  Family history of diabetes mellitus (Mother)      SOCIAL HISTORY    CIGARETTES: denies    ALCOHOL: denies    Past Surgical History:    Allergies:    No Known Allergies      REVIEW OF SYSTEMS:  + orthopnea/PND    Vital Signs Last 24 Hrs  T(C): 36.1 (11 Sep 2019 14:21), Max: 36.6 (10 Sep 2019 20:00)  T(F): 96.9 (11 Sep 2019 14:21), Max: 97.8 (10 Sep 2019 20:00)  HR: 77 (11 Sep 2019 15:25) (73 - 95)  BP: 133/86 (11 Sep 2019 15:25) (130/69 - 157/94)  BP(mean): --  RR: 20 (11 Sep 2019 14:21) (18 - 20)  SpO2: 96% (11 Sep 2019 08:12) (96% - 98%)    PHYSICAL EXAM:    GENERAL: In no apparent distress, well nourished, and hydrated.  NECK: Supple   HEART: Regular rate and rhythm; No murmurs, rubs, or gallops.  PULMONARY: Clear to auscultation and perfusion.    ABDOMEN: Soft, Nontender, Nondistended; Bowel sounds present  EXTREMITIES:  2+ Peripheral Pulses, trace edema  NEUROLOGICAL: Grossly nonfocal      INTERPRETATION OF TELEMETRY:    ECG: < from: 12 Lead ECG (09.10.19 @ 15:42) >  Ventricular Rate 92 BPM    Atrial Rate 92 BPM    P-R Interval 202 ms    QRS Duration 96 ms    Q-T Interval 384 ms    QTC Calculation(Bezet) 474 ms    P Axis 42 degrees    R Axis -20 degrees    T Axis 72 degrees    Diagnosis Line Sinus rhythm with occasional Premature ventricular complexes  Anterior infarct , age undetermined  Abnormal ECG    < end of copied text >      I&O's Detail    10 Sep 2019 07:  -  11 Sep 2019 07:00  --------------------------------------------------------  IN:    Oral Fluid: 300 mL  Total IN: 300 mL    OUT:    Voided: 500 mL  Total OUT: 500 mL    Total NET: -200 mL          LABS:                        14.2   6.83  )-----------( 191      ( 11 Sep 2019 06:22 )             42.9         141  |  104  |  19  ----------------------------<  95  3.9   |  26  |  1.0    Ca    8.9      11 Sep 2019 06:22  Phos  4.3       Mg     2.0         TPro  7.0  /  Alb  4.2  /  TBili  0.5  /  DBili  x   /  AST  31  /  ALT  39  /  AlkPhos  80  10    CARDIAC MARKERS ( 11 Sep 2019 06:22 )  x     / <0.01 ng/mL / 157 U/L / x     / 2.4 ng/mL  CARDIAC MARKERS ( 10 Sep 2019 17:00 )  x     / <0.01 ng/mL / x     / x     / x              BNPSerum Pro-Brain Natriuretic Peptide: 124 pg/mL (09-10 @ 17:00)    I&O's Detail    10 Sep 2019 07:  -  11 Sep 2019 07:00  --------------------------------------------------------  IN:    Oral Fluid: 300 mL  Total IN: 300 mL    OUT:    Voided: 500 mL  Total OUT: 500 mL    Total NET: -200 mL        Daily Height in cm: 180.34 (11 Sep 2019 01:41)    Daily Weight in k (11 Sep 2019 05:31)    RADIOLOGY & ADDITIONAL STUDIES:  < from: Xray Chest 1 View AP/PA (09.10.19 @ 22:22) >  Impression:      No radiographic evidence of acute cardiopulmonary disease.

## 2019-09-11 NOTE — H&P ADULT - ATTENDING COMMENTS
Patient seen and examined independently. I agree with the resident's note, physical exam, and plan except as below.  Vital Signs Last 24 Hrs  T(C): 36.1 (11 Sep 2019 05:31), Max: 36.6 (10 Sep 2019 20:00)  T(F): 97 (11 Sep 2019 05:31), Max: 97.8 (10 Sep 2019 20:00)  HR: 79 (11 Sep 2019 05:31) (76 - 95)  BP: 130/69 (11 Sep 2019 05:31) (130/69 - 157/94)  BP(mean): --  RR: 18 (11 Sep 2019 05:31) (18 - 20)  SpO2: 96% (11 Sep 2019 08:12) (96% - 98%)  Pe  obese  [id number]  ctabl  soft ntnd+bs  no cce  right ankle - mild tenderness- no erythema nonpitting edema     #SOB/chest pain - acs ruled out, Ct chest negative for PE - not currently feeling SOB  #NICM - sp cath negative in past, spoke with Cardio Dr tatum on phone - repeat echo in office EF was 40%  pt was supposed to go for cardiac MRI for etiology  will repeat echo here and venous duplex to rule  out DVT - pt went on long flight to [place]  #right ankle pain - check Xrs and duplex - does not appear to look like gout at this time  #needs outpt sleep study for Lucy eval - has pulmonary htn    if duplex and echo wnl can dc home with outpt follow up from dr tatum in office  if EF reduced then need to call EPS    discussed with pt and resident

## 2019-09-11 NOTE — DISCHARGE NOTE PROVIDER - NSDCCPCAREPLAN_GEN_ALL_CORE_FT
PRINCIPAL DISCHARGE DIAGNOSIS  Diagnosis: Chest pain  Assessment and Plan of Treatment: Follow up with cardiologist and Pulmonologist for sleep study. take medications as directed      SECONDARY DISCHARGE DIAGNOSES  Diagnosis: Leg pain  Assessment and Plan of Treatment:     Diagnosis: Shortness of breath  Assessment and Plan of Treatment: PRINCIPAL DISCHARGE DIAGNOSIS  Diagnosis: Chest pain  Assessment and Plan of Treatment: Follow up with cardiologist and Pulmonologist for sleep study. take medications as directed      SECONDARY DISCHARGE DIAGNOSES  Diagnosis: Chronic HFrEF (heart failure with reduced ejection fraction)  Assessment and Plan of Treatment: Please follow up with Dr Lawrence in 3-4 weeks and finish course of antibiotics    Diagnosis: Leg pain  Assessment and Plan of Treatment:     Diagnosis: Shortness of breath  Assessment and Plan of Treatment:

## 2019-09-11 NOTE — H&P ADULT - NSHPREVIEWOFSYSTEMS_GEN_ALL_CORE
REVIEW OF SYSTEMS:  CONSTITUTIONAL: No weakness, fevers or chills  EYES/ENT: No visual changes;  No vertigo or throat pain   NECK: No pain or stiffness  RESPIRATORY: No cough, wheezing, hemoptysis; Admits shortness of breath  CARDIOVASCULAR: Admits chest pain. No palpitations  GASTROINTESTINAL: No abdominal or epigastric pain. No nausea, vomiting, or hematemesis; No diarrhea or constipation. No melena or hematochezia.  GENITOURINARY: No dysuria, frequency or hematuria  NEUROLOGICAL: No numbness or weakness  SKIN: No itching, rashes

## 2019-09-11 NOTE — H&P ADULT - CONTRAINDICATIONS ACEI/ARB
[facility]  Report of Consultation    Celine Morrow Patient Status:  Inpatient    3/2/1953 MRN [id number]   [facility] 7NE-A Attending Elgin Puente MD   Hosp Day # 2 PCP Chasidy Emoryer     Reason for Consultation:  [facility]  No date: OTHER      Comment: FINGER AMPUTATION--ACCIDENTAL  No date: REMOVAL GALLBLADDER  Family History   Problem Relation Age of Onset   • berenice [OTHER] Brother       reports that she has been smoking Cigarettes.   She has a 20.00 pack-year smoking mcg/min, Intravenous, Continuous PRN  •  atorvastatin (LIPITOR) tab 80 mg, 80 mg, Oral, Nightly  •  aspirin 300 MG rectal suppository 300 mg, 300 mg, Rectal, Daily **OR** aspirin tab 325 mg, 325 mg, Oral, Daily    Review of Systems:  All systems were revie No

## 2019-09-11 NOTE — DISCHARGE NOTE PROVIDER - PROVIDER TOKENS
PROVIDER:[TOKEN:[[id number]]],PROVIDER:[TOKEN:[[id number]]] PROVIDER:[TOKEN:[[id number]]],PROVIDER:[TOKEN:[[id number]]],PROVIDER:[TOKEN:[[id number]]]

## 2019-09-11 NOTE — H&P ADULT - HISTORY OF PRESENT ILLNESS
A 43 yo M w/ PMH of HTN and HFrEF (EF 20-25%) presents to ED with increased SOB and chest pain x 2 hours. Pt states he had two episodes of CP associated with CP in this month. Pt describes the CP as substernal, constant, pressure, non-radiating, moderate, and has it even at rest. He endores SOB with rest and increases with exertion. Currently, pt denies any HA, vision changes, cough, f/c, abd pain, n/v/d, diaphoresis, injuries/traumas/falls, or hx of DVT/PE. Of note, pt had cath 12/2018 which was unremarkable. Patient takes Lasix 40mg BID and Entresto daily. He also notices increased BLE edema and has pain in lower R ankle. No recent surgeries or long travel.    Vital Signs Last 24 Hrs  T(C): 35.9 (11 Sep 2019 01:41), Max: 36.6 (10 Sep 2019 20:00)  T(F): 96.6 (11 Sep 2019 01:41), Max: 97.8 (10 Sep 2019 20:00)  HR: 81 (11 Sep 2019 01:41) (76 - 95)  BP: 144/94 (11 Sep 2019 01:41) (132/89 - 157/94)  BP(mean): --  RR: 18 (11 Sep 2019 01:41) (18 - 20)  SpO2: 97% (11 Sep 2019 01:41) (97% - 98%)    In ED, troponin was negative x 1. EKG unremarkable. CTA negative.

## 2019-09-11 NOTE — CONSULT NOTE ADULT - ASSESSMENT
45 yo M w/ PMH of HTN and NICM with Ef 30-35%    NICM  HTN    Plan  - recommend cardiac MRI to eval for sarcoidosis  - plan for SQ ICD this admission (either thursday or friday depending on timing of MRI)  - Maintain K>4, Mg >2  - NPO after midnight 45 yo M w/ PMH of HTN and NICM with Ef 30-35%    NICM  HTN    Plan  - recommend cardiac MRI to eval for sarcoidosis, non-compaction  - plan for SQ ICD this admission (Friday depending on timing of MRI)  - Maintain K>4, Mg >2  - NPO after midnight

## 2019-09-11 NOTE — H&P ADULT - NSICDXFAMILYHX_GEN_ALL_CORE_FT
FAMILY HISTORY:  Father  Still living? Unknown  Family history of heart disease, Age at diagnosis: Age Unknown    Mother  Still living? Unknown  Family history of diabetes mellitus, Age at diagnosis: Age Unknown

## 2019-09-11 NOTE — H&P ADULT - ASSESSMENT
A 45 yo M w/ PMH of HTN and HFrEF (EF 20-25%) presents to ED with increased SOB and chest pain x 2 hours.     # Chest pain   - tender to palpate   - likely secondary to musculoskeletal pain  - order rib XR  - NSAID for rib pain  - although unlikely given reproducible CP, r/o unstable angina vs ACS  - serial ekg / ce x 3  - check ECHO  - c/w home meds, add aspirin  - consider cardiology eval if CP persist    # SOB   # HFrEF   - r/o MADHAVI   - STOP-BANG score of 6, consider OP sleep study for MADHAVI  - pt does not appear to be fluid overload, c/w home diuretics   - repeat ECHO     # HTN, stable  - c/w home meds  - DASH diet     # Rt ankle pain  - r/o trauma order ankle XR  - pain PRN     Diet: DASH diet   GI ppx: protonix   DVT ppx: Lovenox 40 mg qD  Activity: increase as tolerated  Code status: Full Code ( X ) / DNR (  ) / DNI (  )  Disposition: from home, requires medical management

## 2019-09-11 NOTE — H&P ADULT - NSHPPHYSICALEXAM_GEN_ALL_CORE
PHYSICAL EXAM:  GENERAL: NAD, AAO x 4, 44y M obese  HEAD:  Atraumatic, Normocephalic  EYES: EOMI, conjunctiva clear and sclera white  NECK: Supple, No JVD  CHEST/LUNG: tender to palpate on left ribs. Clear to auscultation bilaterally; No wheeze; No crackles; No accessory muscles used  HEART: Regular rate and rhythm; No murmurs;   ABDOMEN: Soft, Nontender, Nondistended; Bowel sounds present; No guarding  EXTREMITIES:  2+ Peripheral Pulses, No cyanosis or edema  NEUROLOGY: non-focal

## 2019-09-11 NOTE — H&P ADULT - NSHPLABSRESULTS_GEN_ALL_CORE
LABS:                        14.5   7.01  )-----------( 215      ( 10 Sep 2019 17:00 )             45.1     09-10    148<H>  |  106  |  18  ----------------------------<  154<H>  4.2   |  29  |  1.2    Ca    9.4      10 Sep 2019 17:00    TPro  7.0  /  Alb  4.2  /  TBili  0.5  /  DBili  x   /  AST  31  /  ALT  39  /  AlkPhos  80  09-10          Lactate Trend    CARDIAC MARKERS ( 10 Sep 2019 17:00 )  x     / <0.01 ng/mL / x     / x     / x          CAPILLARY BLOOD GLUCOSE  Serum Pro-Brain Natriuretic Peptide (09.10.19 @ 17:00)     Serum Pro-Brain Natriuretic Peptide: 124 pg/mL      RADIOLOGY:    < from: CT Angio Chest w/ IV Cont (09.10.19 @ 21:53) >  IMPRESSION:   No evidence for acute pulmonary pathology, specifically no evidence for   acute pulmonary artery embolism.  Stable ascending aortic aneurysm to 4.7 cm.  Stable dilated main pulmonary artery which can be seen in pulmonary   hypertension.  Hepatic steatosis.  < end of copied text >      EKGS:    < from: 12 Lead ECG (09.10.19 @ 15:42) >  Diagnosis Line Sinus rhythm with occasional Premature ventricular complexes  Anterior infarct , age undetermined  Abnormal ECG  < end of copied text >

## 2019-09-12 DIAGNOSIS — I10 ESSENTIAL (PRIMARY) HYPERTENSION: ICD-10-CM

## 2019-09-12 DIAGNOSIS — M79.606 PAIN IN LEG, UNSPECIFIED: ICD-10-CM

## 2019-09-12 DIAGNOSIS — I50.9 HEART FAILURE, UNSPECIFIED: ICD-10-CM

## 2019-09-12 LAB
ANION GAP SERPL CALC-SCNC: 11 MMOL/L — SIGNIFICANT CHANGE UP (ref 7–14)
BUN SERPL-MCNC: 17 MG/DL — SIGNIFICANT CHANGE UP (ref 10–20)
CALCIUM SERPL-MCNC: 8.7 MG/DL — SIGNIFICANT CHANGE UP (ref 8.5–10.1)
CHLORIDE SERPL-SCNC: 102 MMOL/L — SIGNIFICANT CHANGE UP (ref 98–110)
CO2 SERPL-SCNC: 29 MMOL/L — SIGNIFICANT CHANGE UP (ref 17–32)
CREAT SERPL-MCNC: 0.9 MG/DL — SIGNIFICANT CHANGE UP (ref 0.7–1.5)
GLUCOSE SERPL-MCNC: 135 MG/DL — HIGH (ref 70–99)
HCT VFR BLD CALC: 44.3 % — SIGNIFICANT CHANGE UP (ref 42–52)
HGB BLD-MCNC: 14.2 G/DL — SIGNIFICANT CHANGE UP (ref 14–18)
MAGNESIUM SERPL-MCNC: 2.1 MG/DL — SIGNIFICANT CHANGE UP (ref 1.8–2.4)
MCHC RBC-ENTMCNC: 27.8 PG — SIGNIFICANT CHANGE UP (ref 27–31)
MCHC RBC-ENTMCNC: 32.1 G/DL — SIGNIFICANT CHANGE UP (ref 32–37)
MCV RBC AUTO: 86.9 FL — SIGNIFICANT CHANGE UP (ref 80–94)
NRBC # BLD: 0 /100 WBCS — SIGNIFICANT CHANGE UP (ref 0–0)
PHOSPHATE SERPL-MCNC: 3.3 MG/DL — SIGNIFICANT CHANGE UP (ref 2.1–4.9)
PLATELET # BLD AUTO: 210 K/UL — SIGNIFICANT CHANGE UP (ref 130–400)
POTASSIUM SERPL-MCNC: 3.6 MMOL/L — SIGNIFICANT CHANGE UP (ref 3.5–5)
POTASSIUM SERPL-SCNC: 3.6 MMOL/L — SIGNIFICANT CHANGE UP (ref 3.5–5)
RBC # BLD: 5.1 M/UL — SIGNIFICANT CHANGE UP (ref 4.7–6.1)
RBC # FLD: 13.5 % — SIGNIFICANT CHANGE UP (ref 11.5–14.5)
SODIUM SERPL-SCNC: 142 MMOL/L — SIGNIFICANT CHANGE UP (ref 135–146)
WBC # BLD: 6.77 K/UL — SIGNIFICANT CHANGE UP (ref 4.8–10.8)
WBC # FLD AUTO: 6.77 K/UL — SIGNIFICANT CHANGE UP (ref 4.8–10.8)

## 2019-09-12 PROCEDURE — 75561 CARDIAC MRI FOR MORPH W/DYE: CPT | Mod: 26

## 2019-09-12 PROCEDURE — 99233 SBSQ HOSP IP/OBS HIGH 50: CPT

## 2019-09-12 RX ADMIN — SACUBITRIL AND VALSARTAN 1 TABLET(S): 24; 26 TABLET, FILM COATED ORAL at 06:08

## 2019-09-12 RX ADMIN — Medication 81 MILLIGRAM(S): at 12:21

## 2019-09-12 RX ADMIN — CARVEDILOL PHOSPHATE 3.12 MILLIGRAM(S): 80 CAPSULE, EXTENDED RELEASE ORAL at 17:28

## 2019-09-12 RX ADMIN — Medication 40 MILLIGRAM(S): at 06:08

## 2019-09-12 RX ADMIN — Medication 40 MILLIGRAM(S): at 17:28

## 2019-09-12 RX ADMIN — CARVEDILOL PHOSPHATE 3.12 MILLIGRAM(S): 80 CAPSULE, EXTENDED RELEASE ORAL at 06:08

## 2019-09-12 RX ADMIN — ENOXAPARIN SODIUM 40 MILLIGRAM(S): 100 INJECTION SUBCUTANEOUS at 21:54

## 2019-09-12 RX ADMIN — SACUBITRIL AND VALSARTAN 1 TABLET(S): 24; 26 TABLET, FILM COATED ORAL at 17:28

## 2019-09-12 NOTE — PROGRESS NOTE ADULT - SUBJECTIVE AND OBJECTIVE BOX
DAILY PROGRESS NOTE  ===========================================================  Patient Information:   Hospital Day:</BASHIR ALCOCER  /  44y  /  Male  /b> 2d /  MRN#: [id number]  Working / Admitting Diagnosis:  1. CHEST PAIN    |:::::::::::::::::::::::::::::| SUBJECTIVE |:::::::::::::::::::::::::::::::|  OVERNIGHT EVENTS:   No acute events noted.  Denies chest pain / SOB / palpitations / Nausea / Vomitting / constipation / diarrhea or abdominal pain.   ROS otherwise negative.    Past Medical History:   HTN (hypertension)  CHF (congestive heart failure)      ALLERGIES:  No Known Allergies    HOME MEDICATIONS:  Entresto 24 mg-26 mg oral tablet: 1 tab(s) orally 2 times a day (11 Sep 2019 03:02)  furosemide 40 mg oral tablet: 1 tab(s) orally 2 times a day (11 Sep 2019 03:02)      |:::::::::::::::::::::::::::| OBJECTIVE |:::::::::::::::::::::::::::|    VITAL SIGNS: Last 24 Hours  T(F): 97.5 (12 Sep 2019 05:30), Max: 97.5 (12 Sep 2019 05:30)  HR: 68 (12 Sep 2019 05:30) (68 - 86)  BP: 124/73 (12 Sep 2019 05:30) (124/73 - 136/87)  RR: 19 (11 Sep 2019 20:33) (19 - 20)  SpO2: 97% (12 Sep 2019 06:43) (97% - 97%)    09-11-19 @ 07:01  -  09-12-19 @ 07:00  --------------------------------------------------------  IN: 400 mL / OUT: 600 mL / NET: -200 mL      PHYSICAL EXAM:  GENERAL:   Awake, alert; NAD.  HEENT:  Head NC/AT; Conjunctivae pink, Sclera anicteric & non-injected; Oral mucosa moist.  NECK:   Supple.  CARDIO:   RRR; S1 & S2 audible  RESP:  No respiratory distress or accessory muscle use. CTAB  GI:   Soft/ NT/ND / No guarding; No rebound tenderness.  EXT:   Without any cyanosis, clubbing, rash, lesions or edema.     LAB RESULTS:                        14.2   6.77  )-----------( 210      ( 12 Sep 2019 05:30 )             44.3     09-12    142  |  102  |  17  ----------------------------<  135<H>  3.6   |  29  |  0.9    Ca    8.7      12 Sep 2019 05:30  Phos  3.3     09-12  Mg     2.1     09-12    TPro  7.0  /  Alb  4.2  /  TBili  0.5  /  DBili  x   /  AST  31  /  ALT  39  /  AlkPhos  80  09-10    CARDIAC MARKERS ( 11 Sep 2019 06:22 )  x     / <0.01 ng/mL / 157 U/L / x     / 2.4 ng/mL  CARDIAC MARKERS ( 10 Sep 2019 17:00 )  x     / <0.01 ng/mL / x     / x     / x        INPATIENT MEDICATIONS:  aspirin enteric coated 81 milliGRAM(s) Oral daily  carvedilol 3.125 milliGRAM(s) Oral every 12 hours  enoxaparin Injectable 40 milliGRAM(s) SubCutaneous at bedtime  furosemide    Tablet 40 milliGRAM(s) Oral two times a day  influenza   Vaccine 0.5 milliLiter(s) IntraMuscular once  sacubitril 24 mG/valsartan 26 mG 1 Tablet(s) Oral two times a day    PRN MEDICATIONS    ------------------------------------------------------------------------------------------------------------

## 2019-09-12 NOTE — PROGRESS NOTE ADULT - ASSESSMENT
A 43 yo M w/ PMH of HTN and HFrEF (EF 20-25%) presents to ED with increased SOB and chest pain x 2 hours.     # SOB/chest pain - acs ruled out, Ct chest negative for PE - not currently feeling SOB  # NICM -sp cath negative, Dr tatum to see pt, Echo 30-35%, pending cardiac MR and ICD per EP. entresto and BB  # right ankle pain - duplex neg, f/u xray official read   # needs outpt sleep study for Lucy eval - has pulmonary htn  # Diet: DASH diet   # GI ppx: protonix   # DVT ppx: Lovenox 40 mg qD  # Activity: increase as tolerated  # Code status: Full Code ( X ) / DNR (  ) / DNI (  )  # Disposition: from home, requires medical management

## 2019-09-12 NOTE — CONSULT NOTE ADULT - SUBJECTIVE AND OBJECTIVE BOX
Patient is a 44y old  Male who presents with a chief complaint of CP and sob (12 Sep 2019 16:08)      HPI:  A 45 yo M w/ PMH of HTN, vincent ischemic cm with  and HFrEF (EF 20-25%)dilated aort 4.8CM AND PULMONARY ARTERY,  presents to ED with increased SOB and chest pain x 2 hours. Pt states he had two episodes of CP associated with CP in this month. Pt describes the CP as substernal, constant, pressure, non-radiating, moderate, and has it even at rest. He wiyh SOB with rest and increases with exertion. Currently, pt denies any HA, vision changes, cough, f/c, abd pain, n/v/d, diaphoresis, injuries/traumas/falls, or hx of DVT/PE. Of note, pt had cath 12/2018 which was unremarkable. Patient takes Lasix 40mg BID and Entresto daily. He also notices increased BLE edema and has pain in lower R ankle. No recent surgeries or long travel.    Vital Signs Last 24 Hrs  T(C): 35.9 (11 Sep 2019 01:41), Max: 36.6 (10 Sep 2019 20:00)  T(F): 96.6 (11 Sep 2019 01:41), Max: 97.8 (10 Sep 2019 20:00)  HR: 81 (11 Sep 2019 01:41) (76 - 95)  BP: 144/94 (11 Sep 2019 01:41) (132/89 - 157/94)  BP(mean): --  RR: 18 (11 Sep 2019 01:41) (18 - 20)  SpO2: 97% (11 Sep 2019 01:41) (97% - 98%)    In ED, troponin was negative 1. EKG unremarkable. CTA negative. (11 Sep 2019 02:40)      PAST MEDICAL & SURGICAL HISTORY:  HTN (hypertension)  CHF (congestive heart failure): EF of 20-25%  No significant past surgical history      PREVIOUS DIAGNOSTIC TESTING:      ECHO< from: Transthoracic Echocardiogram (09.11.19 @ 12:15) >  Summary:   1. Left ventricular ejection fraction, by visual estimation, is 30 to   35%.   2. Spectral Doppler shows pseudonormal pattern of left ventricular   myocardial filling (Grade II diastolic dysfunction).   3. Mild mitral valve regurgitation.   4. Mild aortic regurgitation.   5. Dilatation of the aortic root and ascending aorta.    < end of copied text >  Cardiac MRI< from: MR Cardiac w/wo IV Cont (09.12.19 @ 13:00) >  IMPRESSION:      1. Mildly dilated left ventricle with global left ventricular hypokinesis   and depressed function LV EF = 30 %.    2. Normal right ventricular size and function.    3. No evidence for late gadolinium enhancement.    4. Dilated ascending thoracic aorta measuring up to 4.7 cm.      < end of copied text >      CATHETERIZATION  FINDINGS:< from: Cardiac Cath Lab - Adult (12.21.18 @ 15:07) >    IMPRESSIONS:    1. No angiographically significant CAD.    2. Dilated AorticRoot.    3. Mild Aortic Regurgitation.      < end of copied text >      MEDICATIONS  (STANDING):  aspirin enteric coated 81 milliGRAM(s) Oral daily  carvedilol 3.125 milliGRAM(s) Oral every 12 hours  enoxaparin Injectable 40 milliGRAM(s) SubCutaneous at bedtime  furosemide    Tablet 40 milliGRAM(s) Oral two times a day  influenza   Vaccine 0.5 milliLiter(s) IntraMuscular once  sacubitril 24 mG/valsartan 26 mG 1 Tablet(s) Oral two times a day    MEDICATIONS  (PRN):      FAMILY HISTORY:  Family history of heart disease (Father)  Family history of diabetes mellitus (Mother)      SOCIAL HISTORY:  CIGARETTES: never  ALCOHOL: none  DRUGS: none                      REVIEW OF SYSTEMS:  CONSTITUTIONAL: No distress, Looks stable  NECK: No pain   RESPIRATORY: No cough, wheezing,(+) shortness of breath  CARDIOVASCULAR: (+) chest pain, (+)SOB, palpitations, leg swelling  GASTROINTESTINAL: No abdominal or epigastric pain. No nausea, vomiting, or hematemesis;  No melena.  NEUROLOGICAL: No dizziness, headaches, memory loss, loss of strength  SKIN: No itching, burning, rashes, or lesions   ENDOCRINE: No heat or cold intolerance  MUSCULOSKELETAL: No joint pain, No  swelling; No muscle pain  PSYCHIATRIC: No depression, anxiety, mood swings, or difficulty sleeping  ALLERGY: No hives, itching, rash          Vital Signs Last 24 Hrs  T(C): 36.4 (12 Sep 2019 05:30), Max: 36.4 (12 Sep 2019 05:30)  T(F): 97.5 (12 Sep 2019 05:30), Max: 97.5 (12 Sep 2019 05:30)  HR: 86 (12 Sep 2019 17:27) (68 - 86)  BP: 122/79 (12 Sep 2019 17:27) (122/79 - 132/80)  BP(mean): --  RR: 19 (11 Sep 2019 20:33) (19 - 19)  SpO2: 94% (12 Sep 2019 08:10) (94% - 97%)                      PHYSICAL EXAM:  GENERAL: No distress, well developed  HEAD:  Atraumatic, Normocephalic  NECK: Supple, No JVD, No Bruit of either carotid arteries  NERVOUS SYSTEM:  Alert, Awake, Oriented to time, place, person; Normal memory and speech; Normal motor Strength 5/5 B/L upper and lower extremities  CHEST/LUNG: Normal air entry to lung base bilaterally; No wheeze, crackle, rales, rhonchi  HEART: Regular heart beat, S1, A2, P2, No S3, No S4, No gallop, No murmur  ABDOMEN: Soft, Non tender, Non distended; Bowel sounds present  EXTREMITIES:  2+ Peripheral Pulses, No clubbing, No edema  SKIN: No rashes or lesions    TELEMETRY:nsr with vpds    ECG:  < from: 12 Lead ECG (09.10.19 @ 15:42) >  Diagnosis Line Sinus rhythm with occasional Premature ventricular complexes  Anterior infarct , age undetermined  Abnormal ECG      < end of copied text >    I&O's Detail    11 Sep 2019 07:01  -  12 Sep 2019 07:00  --------------------------------------------------------  IN:    Oral Fluid: 400 mL  Total IN: 400 mL    OUT:    Voided: 600 mL  Total OUT: 600 mL    Total NET: -200 mL          LABS:                        14.2   6.77  )-----------( 210      ( 12 Sep 2019 05:30 )             44.3     09-12    142  |  102  |  17  ----------------------------<  135<H>  3.6   |  29  |  0.9    Ca    8.7      12 Sep 2019 05:30  Phos  3.3     09-12  Mg     2.1     09-12      CARDIAC MARKERS ( 11 Sep 2019 06:22 )  x     / <0.01 ng/mL / 157 U/L / x     / 2.4 ng/mL          I&O's Summary    11 Sep 2019 07:01  -  12 Sep 2019 07:00  --------------------------------------------------------  IN: 400 mL / OUT: 600 mL / NET: -200 mL        RADIOLOGY & ADDITIONAL STUDIES:

## 2019-09-12 NOTE — PROGRESS NOTE ADULT - SUBJECTIVE AND OBJECTIVE BOX
EP FOLLOW UP NOTE    INTERVAL HPI/OVERNIGHT EVENTS: No acute overnight events. Pt just returned from MRI. He has no chest pain or dyspnea or syncope.     MEDICATIONS  (STANDING):  aspirin enteric coated 81 milliGRAM(s) Oral daily  carvedilol 3.125 milliGRAM(s) Oral every 12 hours  enoxaparin Injectable 40 milliGRAM(s) SubCutaneous at bedtime  furosemide    Tablet 40 milliGRAM(s) Oral two times a day  influenza   Vaccine 0.5 milliLiter(s) IntraMuscular once  sacubitril 24 mG/valsartan 26 mG 1 Tablet(s) Oral two times a day    MEDICATIONS  (PRN):    Allergies  No Known Allergies    Intolerances    Vital Signs Last 24 Hrs  T(C): 36.4 (12 Sep 2019 05:30), Max: 36.4 (12 Sep 2019 05:30)  T(F): 97.5 (12 Sep 2019 05:30), Max: 97.5 (12 Sep 2019 05:30)  HR: 68 (12 Sep 2019 05:30) (68 - 86)  BP: 124/73 (12 Sep 2019 05:30) (124/73 - 136/87)  BP(mean): --  RR: 19 (11 Sep 2019 20:33) (19 - 19)  SpO2: 94% (12 Sep 2019 08:10) (94% - 97%)    09-11-19 @ 07:01  -  09-12-19 @ 07:00  --------------------------------------------------------  IN: 400 mL / OUT: 600 mL / NET: -200 mL          TELE:  NSR    Physical Exam  GENERAL: In no apparent distress, well nourished, and hydrated.  HEAD:  Atraumatic, Normocephalic  EYES: EOMI, PERRLA, conjunctiva and sclera clear  ENMT: Moist mucous membranes  NECK: No JVD  HEART: Regular rate and rhythm  PULMONARY: Clear to auscultation and perfusion.   ABDOMEN: Normoactive bowel sounds.   EXTREMITIES:  2+ Peripheral Pulses. No clubbing, cyanosis, or edema  NEUROLOGICAL: Grossly nonfocal    LABS:                        14.2   6.77  )-----------( 210      ( 12 Sep 2019 05:30 )             44.3     09-12    142  |  102  |  17  ----------------------------<  135<H>  3.6   |  29  |  0.9    Ca    8.7      12 Sep 2019 05:30  Phos  3.3     09-12  Mg     2.1     09-12    TPro  7.0  /  Alb  4.2  /  TBili  0.5  /  DBili  x   /  AST  31  /  ALT  39  /  AlkPhos  80  09-10      RADIOLOGY & ADDITIONAL TESTS:  < from: Transthoracic Echocardiogram (09.11.19 @ 12:15) >  Summary:   1. Left ventricular ejection fraction, by visual estimation, is 30 to   35%.   2. Spectral Doppler shows pseudonormal pattern of left ventricular   myocardial filling (Grade II diastolic dysfunction).   3. Mild mitral valve regurgitation.   4. Mild aortic regurgitation.   5. Dilatation of the aortic root and ascending aorta.    < end of copied text >      < from: MR Cardiac w/wo IV Cont (09.12.19 @ 13:00) >  IMPRESSION:      1. Mildly dilated left ventricle with global left ventricular hypokinesis   and depressed function LV EF = 30 %.    2. Normal right ventricular size and function.    3. No evidence for late gadolinium enhancement.    4. Dilated ascending thoracic aorta measuring up to 4.7 cm.    < end of copied text >

## 2019-09-13 PROCEDURE — 71045 X-RAY EXAM CHEST 1 VIEW: CPT | Mod: 26

## 2019-09-13 PROCEDURE — 33270 INS/REP SUBQ DEFIBRILLATOR: CPT

## 2019-09-13 PROCEDURE — 99233 SBSQ HOSP IP/OBS HIGH 50: CPT

## 2019-09-13 RX ORDER — OXYCODONE AND ACETAMINOPHEN 5; 325 MG/1; MG/1
1 TABLET ORAL EVERY 6 HOURS
Refills: 0 | Status: DISCONTINUED | OUTPATIENT
Start: 2019-09-13 | End: 2019-09-15

## 2019-09-13 RX ORDER — VANCOMYCIN HCL 1 G
1000 VIAL (EA) INTRAVENOUS ONCE
Refills: 0 | Status: COMPLETED | OUTPATIENT
Start: 2019-09-13 | End: 2019-09-13

## 2019-09-13 RX ORDER — ACETAMINOPHEN 500 MG
650 TABLET ORAL EVERY 6 HOURS
Refills: 0 | Status: DISCONTINUED | OUTPATIENT
Start: 2019-09-13 | End: 2019-09-15

## 2019-09-13 RX ORDER — VANCOMYCIN HCL 1 G
1500 VIAL (EA) INTRAVENOUS EVERY 12 HOURS
Refills: 0 | Status: COMPLETED | OUTPATIENT
Start: 2019-09-13 | End: 2019-09-14

## 2019-09-13 RX ORDER — VANCOMYCIN HCL 1 G
2000 VIAL (EA) INTRAVENOUS ONCE
Refills: 0 | Status: COMPLETED | OUTPATIENT
Start: 2019-09-13 | End: 2019-09-13

## 2019-09-13 RX ADMIN — CARVEDILOL PHOSPHATE 3.12 MILLIGRAM(S): 80 CAPSULE, EXTENDED RELEASE ORAL at 06:21

## 2019-09-13 RX ADMIN — Medication 300 MILLIGRAM(S): at 21:19

## 2019-09-13 RX ADMIN — OXYCODONE AND ACETAMINOPHEN 1 TABLET(S): 5; 325 TABLET ORAL at 12:43

## 2019-09-13 RX ADMIN — CARVEDILOL PHOSPHATE 3.12 MILLIGRAM(S): 80 CAPSULE, EXTENDED RELEASE ORAL at 17:24

## 2019-09-13 RX ADMIN — SACUBITRIL AND VALSARTAN 1 TABLET(S): 24; 26 TABLET, FILM COATED ORAL at 17:24

## 2019-09-13 RX ADMIN — Medication 40 MILLIGRAM(S): at 06:21

## 2019-09-13 RX ADMIN — Medication 650 MILLIGRAM(S): at 11:46

## 2019-09-13 RX ADMIN — Medication 230.77 MILLIGRAM(S): at 11:39

## 2019-09-13 RX ADMIN — Medication 250 MILLIGRAM(S): at 11:37

## 2019-09-13 RX ADMIN — Medication 40 MILLIGRAM(S): at 17:24

## 2019-09-13 RX ADMIN — SACUBITRIL AND VALSARTAN 1 TABLET(S): 24; 26 TABLET, FILM COATED ORAL at 06:21

## 2019-09-13 RX ADMIN — Medication 81 MILLIGRAM(S): at 12:31

## 2019-09-13 RX ADMIN — Medication 650 MILLIGRAM(S): at 11:48

## 2019-09-13 RX ADMIN — OXYCODONE AND ACETAMINOPHEN 1 TABLET(S): 5; 325 TABLET ORAL at 21:23

## 2019-09-13 NOTE — PROGRESS NOTE ADULT - ASSESSMENT
#SOB/chest pain - acs ruled out, Ct chest negative for PE - not currently feeling SOB  #NICM - sp cath negative in past, spoke with Cardio Dr tatum on phone - repeat echo in office EF was 40%  repeat this admission shows EF 30-35%  cardiac mri negative  for sq ICD today  Ct chest negative, duplex negative  sp Iv lasix x1 yesterday - feels better today - able to lay flat     #right ankle pain - Xr negative - no acute fracture  #needs outpt sleep study for Lucy eval - has pulmonary htn    follow up with eps - interrogation and cxr in am   likely dc home tomorrow

## 2019-09-13 NOTE — CHART NOTE - NSCHARTNOTEFT_GEN_A_CORE
Cardiac Electrophysiology Procedure Report    Date of procedure	9/13/2019  EP Attending	Danya Lawrence MD  Anesthesiologist	Cornelius Somers MD  Referring Physician	Bear Davis MD  Procedure	Subcutaneous ICD Implant    Procedure:  Implant of Subcutaneous Cardioverter Defibrillator (ICD) Implantation  10 Joule Shock Delivered    Indication: 45 yo M with history of NICM (EF 30-35%) referred for subcutaneous ICD implantation for Primary Prevention    The patient is an excellent candidate for primary prevention ICD implant based on MADIT II and SCD-HeFT trial. Patient has NICM and EF < 35% and is at risk for sudden cardiac death. I have explained the procedure in detail and discussed the risks, and benefits of an ICD implant. I have explained that there is a 1% risk of major cardiovascular complication including but not limited to bleeding, infection, damage to the heart or lung, stroke, heart attack and death.  We discussed management of ICD therapy throughout life, including deactivation of the ICD. After all questions were answered to their satisfaction, a shared decision was made to proceed with ICD therapy.      EQUIPMENT IMPLANTED AND BASELINE PARAMETERS  Pulse Generator: [employer] MRI S-ICD  :	ZOOM TV  Model Number:  A219	  Serial Number:   [id number]	  			  Ventricular Lead  Model Number:	5301  Serial Number:	[id number]	  Sensing Configuration: Primary	  10 J Shock Impedance:  85 Ohms	          DESCRIPTION OF PROCEDURE  Screening: Prior to procedure, ECG screening for T wave over-sensing was performed as indicated. Patient was found to have 2 vectors adequate for implant (primary and secondary).     The patient was brought to the Procedure Room in a nonsedated and fasting state, having received preoperative antibiotics. Informed, written consent was obtained prior to the procedure.  Fluoroscopy was used to confirm optimal position of the lead and generator by placing both on the chest and comparing the position of the lead with respect to the RV and the position of the generator with respect to the apex of the heart. The positions were marked.     The chest was cleaned and prepped with serial applications of betadine and chlorhexidine solution. Patient was then covered from head to toe with sterile drapes in the usual manner. Anesthesia was present during the case. Blood pressure, oxygenation and level of comfort were stable throughout     The left mid axillary line was defined. 50/50 of Lidocaine/Marcaine solution was infiltrated into the skin overlying the left mid axillary line and a 4.0 cm incision was made. This incision was extended down to the fascia using a blunt and sharp dissection and electrocautery.   	  A 1 cm incision was performed to the left of the xiphoid process and below the sternal notch. The incision was extended down to the fascia using blunt and sharp dissection and electrocautery. Using a tunneling tool and sheath, a tract was created from the subxiphoid incision to the pocket. The lead was inserted into the sheath and the sleeve was secured to the fascia with two 0 silk sutures tied around the sewing sleeve encapsulating the lead. A second tunneling tool and sheath were used to tunnel from the subxiphoid incision up toward the sternal notch.     Next, the pocket was inspected for bleeding, and electrocautery applied where appropriate. The wound was irrigated with copious amounts of antibiotic solution. The lead was wiped clean and then connected to the pulse generator. The lead and pulse generator was coiled into the pocket. The generator was secured to the fascia with an 0 silk suture.     The margins of both wounds were approximated well, without any tension or overlap. One layer of both incisions was closed with an interrupted layer of 2-0 absorbable Dacron suture for the deep fascial layer. A 10 J shock was delivered while the patient was properly sedated to measure an impedance.     This was followed by a continuous layer of 3-0 absorbable suture. The skin was then closed using Dermabond. Steri-strips were placed over the wound.  A dry, sterile dressing was placed over this. Needle count was performed and found to be consistent at the end of the procedure    COMPLICATIONS:  The patient tolerated the procedure well. There were no immediate complications.    CONCLUSIONS:  Successful implant of Subcutaneous Implantable Cardioverter Defibrillator (ICD)   10 J Impedance Shock Testing    EBL: 20 cc  Fluoro: 12 sec; 20 mGy    _______________________________  Danya Lawrence MD  Cardiac Electrophysiology

## 2019-09-13 NOTE — PROGRESS NOTE ADULT - SUBJECTIVE AND OBJECTIVE BOX
Electrophysiology Brief Post-Op Note    I have personally seen and examined the patient.  I agree with the history and physical which I have reviewed and noted any changes below.      PRE-OP DIAGNOSIS: NICM    POST-OP DIAGNOSIS: NICM    PROCEDURE: Subcutaneous ICD     Physician: Danya Lawrence MD  Assistant: None    ESTIMATED BLOOD LOSS:     20  mL    ANESTHESIA TYPE:  [  ] General Anesthesia  [ x ] Sedation  [ x ] Local/Regional    CONDITION  [  ] Critical  [  ] Serious  [  ] Fair  [ x ] Good      SPECIMENS REMOVED (IF APPLICABLE): None    IMPLANTS (IF APPLICABLE): [employer] Subcutaneous ICD    PLAN OF CARE  - Vancomycin 1.5 g IV q12 h x 2 doses  - Chest X-ray PA now and PA/Lat tomorrow am  - Device interrogation and optimization today. No need to interrogate tomorrow  - NO HEPARIN PRODUCTS OR LOVENOX   - No anticoagulation unless recommended by EP attending  - Keflex 500 mg PO BID x 5 days after 2 doses of Vanco.  - No shower x 3 days.  - No lifting left arm above shoulder level x 1 month. No heavy lifting with left arm x 1 mo.  - No swimming or hot tubs x 1 month.  - Follow up with me in the office in 3-4 weeks at [address] [place] [address], [address] (Phone [phone])    Danya Lawrence MD   Electrophysiology Attending

## 2019-09-13 NOTE — PROGRESS NOTE ADULT - SUBJECTIVE AND OBJECTIVE BOX
HOSPITALIST ATTENDING NOTE    BASHIR ALCOCER  44y Male  [id number]    INTERVAL HPI/OVERNIGHT EVENTS: sq icd placement today    T(C): 36 (09-13-19 @ 13:31), Max: 37.1 (09-12-19 @ 20:31)  HR: 73 (09-13-19 @ 13:31) (73 - 103)  BP: 128/84 (09-13-19 @ 13:31) (122/79 - 142/85)  RR: 18 (09-13-19 @ 13:31) (18 - 18)  SpO2: --  Wt(kg): --      Pe  obese  [id number]  ctabl  soft ntnd+bs  no cce    Consultant(s) Notes Reviewed:  [x ] YES  [ ] NO  Care Discussed with Consultants/Other Providers/ Housestaff [ x] YES  [ ] NO    LABS:                        14.2   6.77  )-----------( 210      ( 12 Sep 2019 05:30 )             44.3     09-12    142  |  102  |  17  ----------------------------<  135<H>  3.6   |  29  |  0.9    Ca    8.7      12 Sep 2019 05:30  Phos  3.3     09-12  Mg     2.1     09-12            RADIOLOGY & ADDITIONAL TESTS:    Imaging or report Personally Reviewed:  [ ] YES  [ ] NO    Case discussed with resident    Care discussed with pt/family      HEALTH ISSUES - PROBLEM Dx:  Leg pain: Leg pain  HTN (hypertension): HTN (hypertension)  CHF (congestive heart failure): CHF (congestive heart failure)

## 2019-09-13 NOTE — CHART NOTE - NSCHARTNOTEFT_GEN_A_CORE
I have personally seen and examined the patient. I agree with the consult note, which I have reviewed and noted any changes below.     Pre-op Diagnosis:  43 yo M w/ PMH of HTN and NICM who is now referred for ICD for primary prevention.     Procedure: Subcutaneous ICD

## 2019-09-14 PROCEDURE — 99232 SBSQ HOSP IP/OBS MODERATE 35: CPT

## 2019-09-14 PROCEDURE — 71046 X-RAY EXAM CHEST 2 VIEWS: CPT | Mod: 26

## 2019-09-14 RX ORDER — CEPHALEXIN 500 MG
1 CAPSULE ORAL
Qty: 10 | Refills: 0
Start: 2019-09-14 | End: 2019-09-18

## 2019-09-14 RX ADMIN — OXYCODONE AND ACETAMINOPHEN 1 TABLET(S): 5; 325 TABLET ORAL at 20:29

## 2019-09-14 RX ADMIN — OXYCODONE AND ACETAMINOPHEN 1 TABLET(S): 5; 325 TABLET ORAL at 21:07

## 2019-09-14 RX ADMIN — SACUBITRIL AND VALSARTAN 1 TABLET(S): 24; 26 TABLET, FILM COATED ORAL at 17:45

## 2019-09-14 RX ADMIN — CARVEDILOL PHOSPHATE 3.12 MILLIGRAM(S): 80 CAPSULE, EXTENDED RELEASE ORAL at 05:23

## 2019-09-14 RX ADMIN — OXYCODONE AND ACETAMINOPHEN 1 TABLET(S): 5; 325 TABLET ORAL at 04:44

## 2019-09-14 RX ADMIN — SACUBITRIL AND VALSARTAN 1 TABLET(S): 24; 26 TABLET, FILM COATED ORAL at 05:23

## 2019-09-14 RX ADMIN — Medication 81 MILLIGRAM(S): at 11:04

## 2019-09-14 RX ADMIN — Medication 300 MILLIGRAM(S): at 12:09

## 2019-09-14 RX ADMIN — OXYCODONE AND ACETAMINOPHEN 1 TABLET(S): 5; 325 TABLET ORAL at 11:04

## 2019-09-14 RX ADMIN — CARVEDILOL PHOSPHATE 3.12 MILLIGRAM(S): 80 CAPSULE, EXTENDED RELEASE ORAL at 17:45

## 2019-09-14 NOTE — PROGRESS NOTE ADULT - SUBJECTIVE AND OBJECTIVE BOX
DEDEBRA BASHIR  44y Male    CHIEF COMPLAINT:    Patient is a 44y old  Male who presents with a chief complaint of CP (14 Sep 2019 10:08)      INTERVAL HPI/OVERNIGHT EVENTS:    Patient seen and examined. No acute events overnight. S/p AICD placement, c/o soreness at the site    ROS: All other systems are negative.    Vital Signs:    T(F): 98.6 (09-14-19 @ 06:00), Max: 98.6 (09-14-19 @ 06:00)  HR: 84 (09-14-19 @ 06:00) (73 - 87)  BP: 114/65 (09-14-19 @ 06:00) (114/65 - 128/84)  RR: 20 (09-14-19 @ 06:00) (18 - 20)  Daily Height in cm: 180.34 (13 Sep 2019 13:31)      PHYSICAL EXAM:    GENERAL:  NAD  SKIN: No rashes or lesions. ICD insertion site dry/clean/ no discharge   HEENT: Atraumatic. Normocephalic.  NECK: Supple, No JVD. No lymphadenopathy.  PULMONARY: CTA B/L. No wheezing. No rales  CVS: Normal S1, S2. Rate and Rhythm are regular.  ABDOMEN/GI: Soft, Nontender, Nondistended; BS present  MSK:  No edema B/L LE. No cklubbing or cyanosis   NEUROLOGIC:  No motor or sensory deficit.  PSYCH: Alert & oriented x 3, normal affect    Consultant(s) Notes Reviewed:  [x ] YES  [ ] NO  Care Discussed with Consultants/Other Providers [ x] YES  [ ] NO    LABS:  Serum Pro-Brain Natriuretic Peptide: 124 pg/mL (09-10-19 @ 17:00)  RADIOLOGY & ADDITIONAL TESTS:      Imaging or report Personally Reviewed:  [x] YES  [ ] NO  EKG reviewed: [x] YES  [ ] NO    Medications:  Standing  aspirin enteric coated 81 milliGRAM(s) Oral daily  carvedilol 3.125 milliGRAM(s) Oral every 12 hours  furosemide    Tablet 40 milliGRAM(s) Oral two times a day  influenza   Vaccine 0.5 milliLiter(s) IntraMuscular once  sacubitril 24 mG/valsartan 26 mG 1 Tablet(s) Oral two times a day  vancomycin  IVPB 1500 milliGRAM(s) IV Intermittent every 12 hours    PRN Meds  acetaminophen   Tablet .. 650 milliGRAM(s) Oral every 6 hours PRN  oxyCODONE    5 mG/acetaminophen 325 mG 1 Tablet(s) Oral every 6 hours PRN      Haven Monterroso MD  s. [phone]

## 2019-09-14 NOTE — PROGRESS NOTE ADULT - ASSESSMENT
#SOB/chest pain/NICM   - acs ruled out, Ct chest negative for PE - not currently feeling SOB, able to lay flat  - Echo this admission with EF 30-35%  - s/p SQ ICD yesterday, chest xray reviewed  Cardiac MRI negative  CT chest/Duplex negative  Keflex 500 Q12H x5 days sent to pharmacy  patient is medically stable for discharge today to follow up with his Cardiologist, Pulmonologist and EP as OP    #Ankle Pain  no fractures, Tylenol PRN    #Progress Note Handoff  Pending (specify):   ---  Family discussion: Plan of care discussed with patient, aware and agreeable   Disposition:  dc home today #SOB/chest pain/NICM   - acs ruled out, Ct chest negative for PE - not currently feeling SOB, able to lay flat  - Echo this admission with EF 30-35%  - s/p SQ ICD yesterday, chest xray reviewed  Cardiac MRI negative  CT chest/Duplex negative  Keflex 500 Q12H x5 days sent to pharmacy  patient is medically stable for discharge today to follow up with his Cardiologist, Pulmonologist and EP as OP    #Ankle Pain  no fractures, Tylenol PRN    #Progress Note Handoff  Pending (specify):   ---  Family discussion: Plan of care discussed with patient, aware and agreeable   Disposition:  dc home tomorrow, patient does not have anyone to pick him up today, patient in agreement

## 2019-09-14 NOTE — PROGRESS NOTE ADULT - SUBJECTIVE AND OBJECTIVE BOX
INTERVAL HPI/OVERNIGHT EVENTS:    Patietn s/p subcutaneous ICD implant  No event over night. Patient with pain at site    MEDICATIONS  (STANDING):  aspirin enteric coated 81 milliGRAM(s) Oral daily  carvedilol 3.125 milliGRAM(s) Oral every 12 hours  furosemide    Tablet 40 milliGRAM(s) Oral two times a day  influenza   Vaccine 0.5 milliLiter(s) IntraMuscular once  sacubitril 24 mG/valsartan 26 mG 1 Tablet(s) Oral two times a day  vancomycin  IVPB 1500 milliGRAM(s) IV Intermittent every 12 hours    MEDICATIONS  (PRN):  acetaminophen   Tablet .. 650 milliGRAM(s) Oral every 6 hours PRN Mild Pain (1 - 3)  oxyCODONE    5 mG/acetaminophen 325 mG 1 Tablet(s) Oral every 6 hours PRN Moderate Pain (4 - 6)      Allergies    No Known Allergies    Intolerances          Vital Signs Last 24 Hrs  T(C): 37 (14 Sep 2019 06:00), Max: 37 (14 Sep 2019 06:00)  T(F): 98.6 (14 Sep 2019 06:00), Max: 98.6 (14 Sep 2019 06:00)  HR: 84 (14 Sep 2019 06:00) (73 - 87)  BP: 114/65 (14 Sep 2019 06:00) (114/65 - 128/84)  BP(mean): --  RR: 20 (14 Sep 2019 06:00) (18 - 20)  SpO2: --    Wound healing well; No hematoma; no bleeding      RADIOLOGY & ADDITIONAL TESTS:      A/P   Patietn s/p subcutaneous ICD implant    - CXR: lead in appropriate position  - Pain control  - Keflex 500 mg PO q12 h x 5 days  - FU in 3-4 weeks with Dr. Lawrence

## 2019-09-15 ENCOUNTER — TRANSCRIPTION ENCOUNTER (OUTPATIENT)
Age: 44
End: 2019-09-15

## 2019-09-15 VITALS
TEMPERATURE: 98 F | RESPIRATION RATE: 20 BRPM | HEART RATE: 95 BPM | DIASTOLIC BLOOD PRESSURE: 91 MMHG | SYSTOLIC BLOOD PRESSURE: 136 MMHG

## 2019-09-15 PROCEDURE — 99239 HOSP IP/OBS DSCHRG MGMT >30: CPT

## 2019-09-15 RX ORDER — CARVEDILOL PHOSPHATE 80 MG/1
1 CAPSULE, EXTENDED RELEASE ORAL
Qty: 28 | Refills: 0
Start: 2019-09-15 | End: 2019-09-28

## 2019-09-15 RX ORDER — SENNA PLUS 8.6 MG/1
2 TABLET ORAL AT BEDTIME
Refills: 0 | Status: DISCONTINUED | OUTPATIENT
Start: 2019-09-15 | End: 2019-09-15

## 2019-09-15 RX ORDER — FUROSEMIDE 40 MG
1 TABLET ORAL
Qty: 28 | Refills: 0
Start: 2019-09-15 | End: 2019-09-28

## 2019-09-15 RX ORDER — SACUBITRIL AND VALSARTAN 24; 26 MG/1; MG/1
1 TABLET, FILM COATED ORAL
Qty: 28 | Refills: 0
Start: 2019-09-15 | End: 2019-09-28

## 2019-09-15 RX ORDER — DOCUSATE SODIUM 100 MG
100 CAPSULE ORAL
Refills: 0 | Status: DISCONTINUED | OUTPATIENT
Start: 2019-09-15 | End: 2019-09-15

## 2019-09-15 RX ORDER — SACUBITRIL AND VALSARTAN 24; 26 MG/1; MG/1
1 TABLET, FILM COATED ORAL
Qty: 0 | Refills: 0 | DISCHARGE

## 2019-09-15 RX ADMIN — CARVEDILOL PHOSPHATE 3.12 MILLIGRAM(S): 80 CAPSULE, EXTENDED RELEASE ORAL at 05:52

## 2019-09-15 RX ADMIN — Medication 100 MILLIGRAM(S): at 05:52

## 2019-09-15 RX ADMIN — Medication 81 MILLIGRAM(S): at 11:27

## 2019-09-15 RX ADMIN — SACUBITRIL AND VALSARTAN 1 TABLET(S): 24; 26 TABLET, FILM COATED ORAL at 05:52

## 2019-09-15 NOTE — PROGRESS NOTE ADULT - ATTENDING COMMENTS
Recommend  - NPO after midnight  - No heparin/lovenox after midnight  - Will plan subcutaneous ICD for tomorrow.  - Monitor lytes and keep K>4 and Mg>2    We discussed the risks/benefits/alternatives, nature of procedure, and follow up care after device is implanted. We also discussed remote monitoring in details. Patient is in agreement with proceeding with the device implant. We discussed the risks of bleeding, hematoma, injury to vessels and heart, perforation, tamponade, pneumothorax, infection, lead dislodgment, device malfunction, and rare risks of stroke/heart attack/death. Patient expressed understanding of the discussion. I answered all questions in detail.
Patient seen and examined independently. I agree with the resident's note, physical exam, and plan except as below.  Vital Signs Last 24 Hrs  T(C): 36.4 (12 Sep 2019 05:30), Max: 36.4 (12 Sep 2019 05:30)  T(F): 97.5 (12 Sep 2019 05:30), Max: 97.5 (12 Sep 2019 05:30)  HR: 68 (12 Sep 2019 05:30) (68 - 86)  BP: 124/73 (12 Sep 2019 05:30) (124/73 - 136/87)  BP(mean): --  RR: 19 (11 Sep 2019 20:33) (19 - 20)  SpO2: 94% (12 Sep 2019 08:10) (94% - 97%)    Pe  obese  [id number]  ctabl  soft ntnd+bs  no cce  right ankle -tenderness and edema improved     #SOB/chest pain - acs ruled out, Ct chest negative for PE - not currently feeling SOB  #NICM - sp cath negative in past, spoke with Cardio Dr tatum on phone - repeat echo in office EF was 40%  repeat this admission shows EF 30-35%  obtain cardiac mri then SQ ICD planned with EPS  Ct chest negative, duplex negative  sp Iv lasix x1 yesterday - feels better today - able to lay flat     #right ankle pain - Xr negative - no acute fracture  #needs outpt sleep study for Lucy eval - has pulmonary htn    #Progress Note Handoff  Pending (specify):  Consults_________, Tests________, Test Results_______, Other___cardaic mri, ICd implant______  Family discussion:with pt  Disposition: Home_xx__/SNF___/Other________/Unknown at this time________
Patient is seen and examined independently at bedside. I agree with the resident's note, history and plan as above. Corrections made where appropriate    All labs, radiologic studies, vitals, orders and medications list reviewed.     Patient is medically stable for discharge home today to finish a 5 day course of Keflex and follow up with his Cardiologist, EP, and pulmonologist. Patient is aware and agreeable with plan

## 2019-09-15 NOTE — PROGRESS NOTE ADULT - PROVIDER SPECIALTY LIST ADULT
Electrophysiology
Hospitalist
Internal Medicine

## 2019-09-15 NOTE — PROGRESS NOTE ADULT - SUBJECTIVE AND OBJECTIVE BOX
Hospital Day:  5d    Subjective:    Pt was interviewed and examined at the bedside in the AM. No acute events overnight.   Endorses some pain in his ICD placement sites.     Denies headaches, changes in vision, chest pains, SOB, n/v/d, abd pain, constipation, changes in urination, pain on urination, weakness, fatigue, joint pain or muscle pain.       Past Medical Hx:   HTN (hypertension)  CHF (congestive heart failure)  No pertinent past medical history    Past Sx:  No significant past surgical history    Allergies:  No Known Allergies    Current Meds:   Standng Meds:  aspirin enteric coated 81 milliGRAM(s) Oral daily  carvedilol 3.125 milliGRAM(s) Oral every 12 hours  docusate sodium 100 milliGRAM(s) Oral two times a day  furosemide    Tablet 40 milliGRAM(s) Oral two times a day  influenza   Vaccine 0.5 milliLiter(s) IntraMuscular once  sacubitril 24 mG/valsartan 26 mG 1 Tablet(s) Oral two times a day  senna 2 Tablet(s) Oral at bedtime    PRN Meds:  acetaminophen   Tablet .. 650 milliGRAM(s) Oral every 6 hours PRN Mild Pain (1 - 3)  oxyCODONE    5 mG/acetaminophen 325 mG 1 Tablet(s) Oral every 6 hours PRN Moderate Pain (4 - 6)    HOME MEDICATIONS:  Entresto 24 mg-26 mg oral tablet: 1 tab(s) orally 2 times a day  furosemide 40 mg oral tablet: 1 tab(s) orally 2 times a day      Vital Signs:   T(F): 97.3 (09-15-19 @ 06:42), Max: 98 (09-14-19 @ 13:43)  HR: 88 (09-15-19 @ 06:42) (88 - 97)  BP: 118/68 (09-15-19 @ 06:42) (118/68 - 126/78)  RR: 20 (09-15-19 @ 06:42) (20 - 20)  SpO2: 97% (09-14-19 @ 13:43) (97% - 97%)        Physical Exam:   GENERAL: NAD  HEENT: NCAT  CHEST/LUNG: CTAB tenderness over surgical sites, site is dry and clean   HEART: Regular rate and rhythm; s1 s2 appreciated, No murmurs, rubs, or gallops  ABDOMEN: Soft, Nontender, Nondistended; Bowel sounds present  EXTREMITIES: No LE edema b/l  NERVOUS SYSTEM:  Alert & Oriented X3        Labs:                   Hemoglobin A1C, Whole Blood: 7.0 % (09-11-19 @ 06:22)    Serum Pro-Brain Natriuretic Peptide: 124 pg/mL (09-10-19 @ 17:00)                  Radiology: Hospital Day:  5d    Subjective:    Pt was interviewed and examined at the bedside in the AM. No acute events overnight.   Endorses some pain in his ICD placement sites.     Denies headaches, changes in vision, chest pains, SOB, n/v/d, abd pain, constipation, changes in urination, pain on urination, weakness, fatigue, joint pain or muscle pain.       Past Medical Hx:   HTN (hypertension)  CHF (congestive heart failure)  No pertinent past medical history    Past Sx:  No significant past surgical history    Allergies:  No Known Allergies    Current Meds:   Standng Meds:  aspirin enteric coated 81 milliGRAM(s) Oral daily  carvedilol 3.125 milliGRAM(s) Oral every 12 hours  docusate sodium 100 milliGRAM(s) Oral two times a day  furosemide    Tablet 40 milliGRAM(s) Oral two times a day  influenza   Vaccine 0.5 milliLiter(s) IntraMuscular once  sacubitril 24 mG/valsartan 26 mG 1 Tablet(s) Oral two times a day  senna 2 Tablet(s) Oral at bedtime    PRN Meds:  acetaminophen   Tablet .. 650 milliGRAM(s) Oral every 6 hours PRN Mild Pain (1 - 3)  oxyCODONE    5 mG/acetaminophen 325 mG 1 Tablet(s) Oral every 6 hours PRN Moderate Pain (4 - 6)    HOME MEDICATIONS:  Entresto 24 mg-26 mg oral tablet: 1 tab(s) orally 2 times a day  furosemide 40 mg oral tablet: 1 tab(s) orally 2 times a day      Vital Signs:   T(F): 97.3 (09-15-19 @ 06:42), Max: 98 (09-14-19 @ 13:43)  HR: 88 (09-15-19 @ 06:42) (88 - 97)  BP: 118/68 (09-15-19 @ 06:42) (118/68 - 126/78)  RR: 20 (09-15-19 @ 06:42) (20 - 20)  SpO2: 97% (09-14-19 @ 13:43) (97% - 97%)    Physical Exam:   GENERAL: NAD  HEENT: NCAT  CHEST/LUNG: CTAB tenderness over surgical sites, site is dry and clean, no discharge  HEART: Regular rate and rhythm; s1 s2 appreciated, No murmurs, rubs, or gallops  ABDOMEN: Soft, Nontender, Nondistended; Bowel sounds present  EXTREMITIES: No LE edema b/l  NERVOUS SYSTEM:  Alert & Oriented X3  Labs:   Hemoglobin A1C, Whole Blood: 7.0 % (09-11-19 @ 06:22)    Serum Pro-Brain Natriuretic Peptide: 124 pg/mL (09-10-19 @ 17:00)    Radiology:

## 2019-09-15 NOTE — DISCHARGE NOTE NURSING/CASE MANAGEMENT/SOCIAL WORK - PATIENT PORTAL LINK FT
You can access the FollowMyHealth Patient Portal offered by [facility] by registering at the following website: [email or web address]. By joining [facility]’s FollowMyHealth portal, you will also be able to view your health information using other applications (apps) compatible with our system.

## 2019-09-15 NOTE — PROGRESS NOTE ADULT - ASSESSMENT
A 43 yo M w/ PMH of HTN and HFrEF (EF 20-25%) presents to ED with increased SOB and chest pain x 2 hours.     # SOB/chest pain likely secodary to NICM  - acs ruled out  - Ct chest negative for PE   - not currently feeling SOB  - s/p negative cath   - ECHO shows EF of 30-35%   - Cardiac MRI EF of 30% and dilated thoracic aorta  - c/w entresto, BB, ASA, Lasix   - f/u outpt cardio   - s/p ICD implant   - Keflex 500 mg PO q12 h x 5 days  - FU in 3-4 weeks with Dr. Lawrence   - CXR normal     # right ankle pain   - duplex neg   f/u xray official read     # Pulm HTN   - outpatient pulm f/u   - outpatient sleep study     Diet: DASH diet   GI ppx: protonix   DVT ppx: Lovenox 40 mg qD  Activity: increase as tolerated  Disposition: d/c home today   FULL CODE A 43 yo M w/ PMH of HTN and HFrEF (EF 20-25%) presents to ED with increased SOB and chest pain x 2 hours.     # SOB/chest pain likely secodary to NICM  - acs ruled out  - Ct chest negative for PE   - not currently feeling SOB  - s/p negative cath   - ECHO shows EF of 30-35%   - Cardiac MRI EF of 30% and dilated thoracic aorta  - c/w entresto, BB, ASA, Lasix   - f/u outpt cardio   - s/p ICD implant   - Keflex 500 mg PO q12 h x 5 days  - FU in 3-4 weeks with Dr. Lawrence   - CXR leads in place, no pneumothorax     # right ankle pain   - duplex neg  - xray no fracture     # Pulm HTN   - outpatient pulm f/u   - outpatient sleep study     Diet: DASH diet   GI ppx: protonix   DVT ppx: Lovenox 40 mg qD  Activity: increase as tolerated  Disposition: d/c home today   FULL CODE A 45 yo M w/ PMH of HTN and HFrEF (EF 20-25%) presents to ED with increased SOB and chest pain x 2 hours.     # SOB/chest pain likely secodary to NICM  - acs ruled out  - Ct chest negative for PE   - not currently feeling SOB  - s/p negative cath   - ECHO shows EF of 30-35%   - Cardiac MRI EF of 30% and dilated thoracic aorta  - c/w entresto, BB, ASA, Lasix   - f/u outpt cardio   - s/p ICD implant   - Keflex 500 mg PO q12 h x 5 days  - FU in 3-4 weeks with Dr. Lawrence   - CXR leads in place, no pneumothorax     # right ankle pain   - duplex neg  - xray no fracture   - Tylenol for pain    # Pulm HTN   - outpatient pulm f/u   - outpatient sleep study     Diet: DASH diet   GI ppx: protonix   DVT ppx: Lovenox 40 mg qD  Activity: increase as tolerated  Disposition: d/c home today   FULL CODE

## 2019-09-19 DIAGNOSIS — I42.9 CARDIOMYOPATHY, UNSPECIFIED: ICD-10-CM

## 2019-09-19 DIAGNOSIS — I27.20 PULMONARY HYPERTENSION, UNSPECIFIED: ICD-10-CM

## 2019-09-19 DIAGNOSIS — R07.89 OTHER CHEST PAIN: ICD-10-CM

## 2019-09-19 DIAGNOSIS — I50.20 UNSPECIFIED SYSTOLIC (CONGESTIVE) HEART FAILURE: ICD-10-CM

## 2019-09-19 DIAGNOSIS — M25.571 PAIN IN RIGHT ANKLE AND JOINTS OF RIGHT FOOT: ICD-10-CM

## 2019-09-19 DIAGNOSIS — I11.0 HYPERTENSIVE HEART DISEASE WITH HEART FAILURE: ICD-10-CM

## 2019-11-29 ENCOUNTER — INPATIENT (INPATIENT)
Facility: HOSPITAL | Age: 44
LOS: 1 days | Discharge: HOME | End: 2019-12-01
Attending: HOSPITALIST | Admitting: HOSPITALIST
Payer: MEDICAID

## 2019-11-29 VITALS
SYSTOLIC BLOOD PRESSURE: 132 MMHG | TEMPERATURE: 98 F | HEART RATE: 95 BPM | RESPIRATION RATE: 18 BRPM | OXYGEN SATURATION: 100 % | DIASTOLIC BLOOD PRESSURE: 78 MMHG

## 2019-11-29 LAB
ALBUMIN SERPL ELPH-MCNC: 4.3 G/DL — SIGNIFICANT CHANGE UP (ref 3.5–5.2)
ALP SERPL-CCNC: 80 U/L — SIGNIFICANT CHANGE UP (ref 30–115)
ALT FLD-CCNC: 34 U/L — SIGNIFICANT CHANGE UP (ref 0–41)
ANION GAP SERPL CALC-SCNC: 12 MMOL/L — SIGNIFICANT CHANGE UP (ref 7–14)
APTT BLD: 26.9 SEC — LOW (ref 27–39.2)
AST SERPL-CCNC: 21 U/L — SIGNIFICANT CHANGE UP (ref 0–41)
BASOPHILS # BLD AUTO: 0.07 K/UL — SIGNIFICANT CHANGE UP (ref 0–0.2)
BASOPHILS NFR BLD AUTO: 0.8 % — SIGNIFICANT CHANGE UP (ref 0–1)
BILIRUB SERPL-MCNC: 0.4 MG/DL — SIGNIFICANT CHANGE UP (ref 0.2–1.2)
BUN SERPL-MCNC: 13 MG/DL — SIGNIFICANT CHANGE UP (ref 10–20)
CALCIUM SERPL-MCNC: 9.4 MG/DL — SIGNIFICANT CHANGE UP (ref 8.5–10.1)
CHLORIDE SERPL-SCNC: 102 MMOL/L — SIGNIFICANT CHANGE UP (ref 98–110)
CO2 SERPL-SCNC: 26 MMOL/L — SIGNIFICANT CHANGE UP (ref 17–32)
CREAT SERPL-MCNC: 1 MG/DL — SIGNIFICANT CHANGE UP (ref 0.7–1.5)
D DIMER BLD IA.RAPID-MCNC: 111 NG/ML DDU — SIGNIFICANT CHANGE UP (ref 0–230)
EOSINOPHIL # BLD AUTO: 0.11 K/UL — SIGNIFICANT CHANGE UP (ref 0–0.7)
EOSINOPHIL NFR BLD AUTO: 1.3 % — SIGNIFICANT CHANGE UP (ref 0–8)
GLUCOSE SERPL-MCNC: 140 MG/DL — HIGH (ref 70–99)
HCT VFR BLD CALC: 44.7 % — SIGNIFICANT CHANGE UP (ref 42–52)
HGB BLD-MCNC: 14.6 G/DL — SIGNIFICANT CHANGE UP (ref 14–18)
IMM GRANULOCYTES NFR BLD AUTO: 0.1 % — SIGNIFICANT CHANGE UP (ref 0.1–0.3)
INR BLD: 1.11 RATIO — SIGNIFICANT CHANGE UP (ref 0.65–1.3)
LYMPHOCYTES # BLD AUTO: 3.28 K/UL — SIGNIFICANT CHANGE UP (ref 1.2–3.4)
LYMPHOCYTES # BLD AUTO: 39.2 % — SIGNIFICANT CHANGE UP (ref 20.5–51.1)
MCHC RBC-ENTMCNC: 28.1 PG — SIGNIFICANT CHANGE UP (ref 27–31)
MCHC RBC-ENTMCNC: 32.7 G/DL — SIGNIFICANT CHANGE UP (ref 32–37)
MCV RBC AUTO: 86 FL — SIGNIFICANT CHANGE UP (ref 80–94)
MONOCYTES # BLD AUTO: 1 K/UL — HIGH (ref 0.1–0.6)
MONOCYTES NFR BLD AUTO: 12 % — HIGH (ref 1.7–9.3)
NEUTROPHILS # BLD AUTO: 3.89 K/UL — SIGNIFICANT CHANGE UP (ref 1.4–6.5)
NEUTROPHILS NFR BLD AUTO: 46.6 % — SIGNIFICANT CHANGE UP (ref 42.2–75.2)
NRBC # BLD: 0 /100 WBCS — SIGNIFICANT CHANGE UP (ref 0–0)
NT-PROBNP SERPL-SCNC: 32 PG/ML — SIGNIFICANT CHANGE UP (ref 0–300)
PLATELET # BLD AUTO: 227 K/UL — SIGNIFICANT CHANGE UP (ref 130–400)
POTASSIUM SERPL-MCNC: 4.2 MMOL/L — SIGNIFICANT CHANGE UP (ref 3.5–5)
POTASSIUM SERPL-SCNC: 4.2 MMOL/L — SIGNIFICANT CHANGE UP (ref 3.5–5)
PROT SERPL-MCNC: 7.1 G/DL — SIGNIFICANT CHANGE UP (ref 6–8)
PROTHROM AB SERPL-ACNC: 12.7 SEC — SIGNIFICANT CHANGE UP (ref 9.95–12.87)
RBC # BLD: 5.2 M/UL — SIGNIFICANT CHANGE UP (ref 4.7–6.1)
RBC # FLD: 13 % — SIGNIFICANT CHANGE UP (ref 11.5–14.5)
SODIUM SERPL-SCNC: 140 MMOL/L — SIGNIFICANT CHANGE UP (ref 135–146)
TROPONIN T SERPL-MCNC: <0.01 NG/ML — SIGNIFICANT CHANGE UP
WBC # BLD: 8.36 K/UL — SIGNIFICANT CHANGE UP (ref 4.8–10.8)
WBC # FLD AUTO: 8.36 K/UL — SIGNIFICANT CHANGE UP (ref 4.8–10.8)

## 2019-11-29 PROCEDURE — 93010 ELECTROCARDIOGRAM REPORT: CPT

## 2019-11-29 PROCEDURE — 71046 X-RAY EXAM CHEST 2 VIEWS: CPT | Mod: 26

## 2019-11-29 PROCEDURE — 99285 EMERGENCY DEPT VISIT HI MDM: CPT

## 2019-11-29 PROCEDURE — 99221 1ST HOSP IP/OBS SF/LOW 40: CPT

## 2019-11-29 RX ORDER — ASPIRIN/CALCIUM CARB/MAGNESIUM 324 MG
81 TABLET ORAL DAILY
Refills: 0 | Status: DISCONTINUED | OUTPATIENT
Start: 2019-11-29 | End: 2019-12-01

## 2019-11-29 RX ORDER — INFLUENZA VIRUS VACCINE 15; 15; 15; 15 UG/.5ML; UG/.5ML; UG/.5ML; UG/.5ML
0.5 SUSPENSION INTRAMUSCULAR ONCE
Refills: 0 | Status: DISCONTINUED | OUTPATIENT
Start: 2019-11-29 | End: 2019-12-01

## 2019-11-29 RX ORDER — SACUBITRIL AND VALSARTAN 24; 26 MG/1; MG/1
1 TABLET, FILM COATED ORAL
Refills: 0 | Status: DISCONTINUED | OUTPATIENT
Start: 2019-11-29 | End: 2019-12-01

## 2019-11-29 RX ORDER — CARVEDILOL PHOSPHATE 80 MG/1
3.12 CAPSULE, EXTENDED RELEASE ORAL EVERY 12 HOURS
Refills: 0 | Status: DISCONTINUED | OUTPATIENT
Start: 2019-11-29 | End: 2019-12-01

## 2019-11-29 RX ORDER — FUROSEMIDE 40 MG
40 TABLET ORAL
Refills: 0 | Status: DISCONTINUED | OUTPATIENT
Start: 2019-11-29 | End: 2019-12-01

## 2019-11-29 NOTE — ED PROVIDER NOTE - CLINICAL SUMMARY MEDICAL DECISION MAKING FREE TEXT BOX
Patient presented with sudden onset chest pain while climbing stairs, associated with dyspnea. Hx exertional dyspnea and chest pain similar to this before but less severe in the past. Otherwise on arrival afebrile, HD stable, asymptomatic at rest. EKG without evidence of STEMI. Obtained labs which showed negative troponin and negative d-dimer, otherwise grossly unremarkable. CXR negative for pneumothorax, pneumonia, widened mediastinum, or any other emergent pathologies. Given negative d-dimer and lack of risk factors, no significant concern for PE. Likely anginal/ACS in nature. Patient too high risk for obs given story and risk factors, and therefore will require admission for further ACS rule out. Patient agrees with plan. HD stable and asymptomatic at time of admission.

## 2019-11-29 NOTE — H&P ADULT - ATTENDING COMMENTS
43 yo M with PMH of HFrEF (30%) and HTN presented to ED complaining of chest pain. Describes it as sudden onset, substernal, tightness, non-radiating, worse with exertion, improved with rest, with associated SOB. Started while walking up stairs. Has history of similar chest pain but never as severe. Also reports crampy RLE pain, intermittent in nature. Denies fevers, chills, N/V/C/D, sick contacts, or other issues. Cardio = Dr Patel. Recent admission to [facility] in Sept 2019 for similar issue.    In ED, trops were negative, no EKG changes. Admitted to r/o ACS. Deemed too high risk for OBS.    REVIEW OF SYSTEMS:  CONSTITUTIONAL: No weakness, fevers or chills  EYES/ENT: No visual changes;  No vertigo or throat pain   NECK: No pain or stiffness  RESPIRATORY: No cough, wheezing, hemoptysis; No shortness of breath  CARDIOVASCULAR: No chest pain or palpitations  GASTROINTESTINAL: No abdominal or epigastric pain. No nausea, vomiting, or hematemesis; No diarrhea or constipation. No melena or hematochezia.  GENITOURINARY: No dysuria, frequency or hematuria  NEUROLOGICAL: No numbness or weakness  SKIN: No itching, rashes    Physical Exam:  General: WN/WD NAD  Neurology: A&Ox3, nonfocal, follows commands  Eyes: PERRLA/ EOMI  ENT/Neck: Neck supple, trachea midline, No JVD  Respiratory: CTA B/L, No wheezing, rales, rhonchi  CV: Normal rate regular rhythm, S1S2, no murmurs, rubs or gallops  Abdominal: Soft, NT, ND +BS,   Extremities: No edema, + peripheral pulses  Skin: No Rashes, Hematoma, Ecchymosis  Incisions: n/a  Tubes: n/a  A/P 43 yo M with PMH of HFrEF (30%) and HTN presented to ED complaining of chest pain. Describes it as sudden onset, substernal, tightness, non-radiating, worse with exertion, improved with rest, with associated SOB. Started while walking up stairs. Has history of similar chest pain but never as severe. Also reports crampy RLE pain, intermittent in nature. Denies fevers, chills, N/V/C/D, sick contacts, or other issues. Cardio = Dr Patel. Recent admission to [facility] in Sept 2019 for similar issue.    In ED, trops were negative, no EKG changes. Admitted to r/o ACS. Deemed too high risk for OBS.    REVIEW OF SYSTEMS: see cc/HPI  CONSTITUTIONAL: No weakness, fevers or chills  EYES/ENT: No visual changes;  No vertigo or throat pain   NECK: No pain or stiffness  RESPIRATORY: No cough, wheezing, hemoptysis; No shortness of breath  CARDIOVASCULAR: (+) chest pain or palpitations  GASTROINTESTINAL: No abdominal or epigastric pain. No nausea, vomiting, or hematemesis; No diarrhea or constipation. No melena or hematochezia.  GENITOURINARY: No dysuria, frequency or hematuria  NEUROLOGICAL: (+) thigh numbness - chronic ? related to 'back problem', NO weakness  SKIN: No itching, rashes    Physical Exam:  General: WN/WD NAD  Neurology: A&Ox3, nonfocal, follows commands  Eyes: PERRLA/ EOMI  ENT/Neck: Neck supple, trachea midline, No JVD  Respiratory: CTA B/L, No wheezing, rales, rhonchi  CV: Normal rate regular rhythm, S1S2, no murmurs, rubs or gallops  Abdominal: Soft, NT, ND +BS,   Extremities: No edema, + peripheral pulses  Skin: No Rashes, Hematoma, Ecchymosis  Incisions: n/a  Tubes: n/a  A/P  suspected ACS r/o MI ( h/o CAD- non-obstructive)  -serial Gee and EKG  -2D echo   -Cardiology eval     Cardiomyopathy / HFrEF - stable /euvolemic   -c/w current outpatient Rx     DVT prophylaxis

## 2019-11-29 NOTE — ED ADULT NURSE NOTE - NSIMPLEMENTINTERV_GEN_ALL_ED
Implemented All Universal Safety Interventions:  [place] to call system. Call bell, personal items and telephone within reach. Instruct patient to call for assistance. Room bathroom lighting operational. Non-slip footwear when patient is off stretcher. Physically safe environment: no spills, clutter or unnecessary equipment. Stretcher in lowest position, wheels locked, appropriate side rails in place.

## 2019-11-29 NOTE — ED PROVIDER NOTE - ATTENDING CONTRIBUTION TO CARE
44 year old male, pmhx HTN, CHF, presenting with sudden onset substernal chest pain described as tight, non-radiating, non-pleuritic, worse with exertion, relieved with rest, mild severity, associated with dyspnea. States it began while walking up the stairs, and states he has a hx chest pain similar to this with exertion but this is more severe. States he has had echos done but no recent stress tests. Of note, patient also endorses 2 days of RLE pain which is intermittent and crampy in nature. Denies having this before and denies trauma. Otherwise denies fevers, headache, vision changes, weakness/numbness, confusion, URI symptoms, neck pain, back pain, cough, palpitations, nausea, vomiting, abdominal pain, diarrhea, constipation, blood in stool/dark stools, urinary symptoms, penile discharge/testicular pain, leg swelling, rash, recent travel or sick contacts.    Vital Signs: I have reviewed the initial vital signs.  Constitutional: NAD, well-nourished, appears stated age, no acute distress.  HEENT: Airway patent, moist MM, no erythema/swelling/deformity of oral structures. EOMI, PERRLA.  CV: regular rate, regular rhythm, well-perfused extremities, 2+ b/l DP and radial pulses equal.  Lungs: BCTA, no increased WOB.  ABD: NTND, no guarding or rebound, no pulsatile mass, no hernias.   MSK: Neck supple, nontender, nl ROM, no stepoff. Chest nontender. Back nontender in TLS spine or to b/l bony structures or flanks. Ext nontender, nl rom, no deformity.   INTEG: Skin warm, dry, no rash.  NEURO: A&Ox3, normal strength, nl sensation throughout, normal speech.   PSYCH: Calm, cooperative, normal affect and interaction.    No CP at this time now that patient is at rest. EKG with no evidence of STEMI but non-specific abnormalities. Will obtain labs, consider D-dimer for leg pain with CP although no PE risk factors. Patient borderline tachycardic. Will also obtain CXR, re-eval.

## 2019-11-29 NOTE — ED PROVIDER NOTE - NS ED ROS FT
CONSTITUTIONAL: (-) fevers, (-) chills, (-) diaphoresis  ENT: (-) congestion, (-) rhinorrhea, (-) sinus pain, (-) sore throat  CARDIO: (+) chest pain, (-) palpitations, (-) edema  PULM: (-) cough, (-) sputum, (+) shortness of breath, (-) wheezing, (-) hemoptysis, (-) stridor  GI: (-) nausea, (-) vomiting, (-) diarrhea, (-) constipation, (-) abdominal pain, (-) melena, (-) hematochezia, (-) rectal bleeding  : (-) dysuria, (-) hematuria, (-) frequency, (-) flank pain  HEME: (-) easy bruising, (-) easy bleeding  MSK: (-) back pain, (-) myalgias, (-) gait difficulty  SKIN: (-) rashes, (-) pallor  NEURO: (-) headache, (-) dizziness, (-) lightheadedness, (-) syncope, (-) weakness    *all other systems negative except as documented above and in the HPI*

## 2019-11-29 NOTE — ED ADULT NURSE NOTE - OBJECTIVE STATEMENT
Patient c/o left sided chest pain and pressure sincethis morning. Patient states pain comes and goes and increases with activity. Patient has CHAMBERLAIN and right leg numbness. On assessment no signs of distress noted. Swelling noted to B/L LE. No redness noted to B/L LE. Patient sating 97% on room air. Denies n/v/f/c. c

## 2019-11-29 NOTE — H&P ADULT - NSHPREVIEWOFSYSTEMS_GEN_ALL_CORE
43 yo M w/ PMH of HTN and HFrEF (EF 20-25%) presents to ED with increased SOB and chest pain x 2 hours. Pt states he had two episodes of CP associated with CP in this month. Pt describes the CP as substernal, constant, pressure, non-radiating, moderate, and has it even at rest. He endores SOB with rest and increases with exertion. Currently, pt denies any HA, vision changes, cough, f/c, abd pain, n/v/d, diaphoresis, injuries/traumas/falls, or hx of DVT/PE. Of note, pt had cath 12/2018 which was unremarkable. Patient takes Lasix 40mg BID and Entresto daily. He also notices increased BLE edema and has pain in lower R ankle. No recent surgeries or long travel.     44 year old male, pmhx HTN, CHF, presenting with sudden onset substernal chest pain described as tight, non-radiating, non-pleuritic, worse with exertion, relieved with rest, mild severity, associated with dyspnea. States it began while walking up the stairs, and states he has a hx chest pain similar to this with exertion but this is more severe. States he has had echos done but no recent stress tests. Of note, patient also endorses 2 days of RLE pain which is intermittent and crampy in nature. Denies having this before and denies trauma. Otherwise denies fevers, headache, vision changes, weakness/numbness, confusion, URI symptoms, neck pain, back pain, cough, palpitations, nausea, vomiting, abdominal pain, diarrhea, constipation, blood in stool/dark stools, urinary symptoms, penile discharge/testicular pain, leg swelling, rash, recent travel or sick contacts.

## 2019-11-29 NOTE — ED ADULT NURSE NOTE - CHPI ED NUR SYMPTOMS NEG
no congestion/no nausea/no chills/no syncope/no fever/no back pain/no vomiting/no dizziness/no diaphoresis

## 2019-11-29 NOTE — H&P ADULT - ASSESSMENT
45 yo M with PMH of HFrEF (30%) and HTN presented to ED complaining of chest pain.     #) Chest pain r/o ACS  - trops -ve x1  - no acute EKG changes  - will trend trops  - consider Cardio consult. Can likely follow up as outpatient    #) HFrEF - c/w Coreg + Entresto 45 yo M with PMH of HFrEF (30%) and HTN presented to ED complaining of chest pain.     #) Chest pain r/o ACS  - trops -ve x1  - no acute EKG changes  - will trend trops  - consider Cardio consult. Can likely follow up as outpatient    #) HFrEF - c/w Coreg + Entresto    DVT ppx: Lovenox subQ  Diet: DASH  Activity: AAT  Code status: FULL  Dispo: anticipate home 45 yo M with PMH of HFrEF (30%) and HTN presented to ED complaining of chest pain.     #) Chest pain r/o ACS  - trops -ve x1, no acute EKG changes  - cath from Dec 2018 = non-obstructive CAD  - will trend trops, repeat EKG in AM  - patient and family requesting to see Dr Patel or associate in hospital - will place consult    #) HFrEF - appears euvolemic on exam, will c/w Lasix at home dose for now 40 PO BID, c/w Coreg + Entresto    DVT ppx: Lovenox subQ  Diet: DASH  Activity: AAT  Code status: FULL  Dispo: anticipate home when cleared by Cardio

## 2019-11-29 NOTE — ED PROVIDER NOTE - PHYSICAL EXAMINATION
VITALS:  I have reviewed the initial vital signs.  GENERAL: Well-developed, well-nourished, in no acute distress. Nontoxic.  HEENT: Sclera clear. No conjunctival pallor. EOMI, PERRLA. Mucous membranes moist.  NECK: supple w FROM. No JVD.  CARDIO: RRR, nl S1 and S2. No murmurs, rubs, or gallops. No peripheral edema. 2+ radial and pedal pulses bilaterally. No chest wall tenderness.  PULM: Normal effort. No tachypnea or retractions. CTA b/l without wheezes, rales, or rhonchi.  MSK: Normal, steady gait.  GI: Abdomen soft and non-distended. Nontender in all four quadrants without rebound or guarding.  SKIN: Warm, dry. No pallor or rashes. Capillary refill <2 seconds.  NEURO: A&Ox3. Speech clear. No gross motor/sensory deficits.

## 2019-11-29 NOTE — ED PROVIDER NOTE - OBJECTIVE STATEMENT
44 year old male w hx of HTN, CHF with EF 35% on echo 9/19 followed by Dr. Patel presents to the ED for evaluation of constant, non-radiating, pressure-like chest pain x 2 hours. Pain began while patient was walking up stairs, associated with exertional shortness of breath. Symptoms mildly improve at rest but are still present. Denies fevers/chills, URI sx, cough, wheezing, palpitations, abd pain, nausea, vomiting, diarrhea, leg pain/swelling, recent travel/hospitalizations/immobilizations, recent illness, black/bloody stools.

## 2019-11-30 LAB
ALBUMIN SERPL ELPH-MCNC: 4.2 G/DL — SIGNIFICANT CHANGE UP (ref 3.5–5.2)
ALP SERPL-CCNC: 79 U/L — SIGNIFICANT CHANGE UP (ref 30–115)
ALT FLD-CCNC: 31 U/L — SIGNIFICANT CHANGE UP (ref 0–41)
ANION GAP SERPL CALC-SCNC: 11 MMOL/L — SIGNIFICANT CHANGE UP (ref 7–14)
AST SERPL-CCNC: 18 U/L — SIGNIFICANT CHANGE UP (ref 0–41)
BASOPHILS # BLD AUTO: 0.06 K/UL — SIGNIFICANT CHANGE UP (ref 0–0.2)
BASOPHILS NFR BLD AUTO: 1 % — SIGNIFICANT CHANGE UP (ref 0–1)
BILIRUB SERPL-MCNC: 0.7 MG/DL — SIGNIFICANT CHANGE UP (ref 0.2–1.2)
BUN SERPL-MCNC: 12 MG/DL — SIGNIFICANT CHANGE UP (ref 10–20)
CALCIUM SERPL-MCNC: 9.5 MG/DL — SIGNIFICANT CHANGE UP (ref 8.5–10.1)
CHLORIDE SERPL-SCNC: 102 MMOL/L — SIGNIFICANT CHANGE UP (ref 98–110)
CK MB CFR SERPL CALC: 1.8 NG/ML — SIGNIFICANT CHANGE UP (ref 0.6–6.3)
CK SERPL-CCNC: 103 U/L — SIGNIFICANT CHANGE UP (ref 0–225)
CO2 SERPL-SCNC: 29 MMOL/L — SIGNIFICANT CHANGE UP (ref 17–32)
CREAT SERPL-MCNC: 1 MG/DL — SIGNIFICANT CHANGE UP (ref 0.7–1.5)
EOSINOPHIL # BLD AUTO: 0.07 K/UL — SIGNIFICANT CHANGE UP (ref 0–0.7)
EOSINOPHIL NFR BLD AUTO: 1.2 % — SIGNIFICANT CHANGE UP (ref 0–8)
GLUCOSE SERPL-MCNC: 128 MG/DL — HIGH (ref 70–99)
HCT VFR BLD CALC: 46.9 % — SIGNIFICANT CHANGE UP (ref 42–52)
HGB BLD-MCNC: 15.1 G/DL — SIGNIFICANT CHANGE UP (ref 14–18)
IMM GRANULOCYTES NFR BLD AUTO: 0.2 % — SIGNIFICANT CHANGE UP (ref 0.1–0.3)
LYMPHOCYTES # BLD AUTO: 2.78 K/UL — SIGNIFICANT CHANGE UP (ref 1.2–3.4)
LYMPHOCYTES # BLD AUTO: 47.5 % — SIGNIFICANT CHANGE UP (ref 20.5–51.1)
MAGNESIUM SERPL-MCNC: 2 MG/DL — SIGNIFICANT CHANGE UP (ref 1.8–2.4)
MCHC RBC-ENTMCNC: 28 PG — SIGNIFICANT CHANGE UP (ref 27–31)
MCHC RBC-ENTMCNC: 32.2 G/DL — SIGNIFICANT CHANGE UP (ref 32–37)
MCV RBC AUTO: 87 FL — SIGNIFICANT CHANGE UP (ref 80–94)
MONOCYTES # BLD AUTO: 0.58 K/UL — SIGNIFICANT CHANGE UP (ref 0.1–0.6)
MONOCYTES NFR BLD AUTO: 9.9 % — HIGH (ref 1.7–9.3)
NEUTROPHILS # BLD AUTO: 2.35 K/UL — SIGNIFICANT CHANGE UP (ref 1.4–6.5)
NEUTROPHILS NFR BLD AUTO: 40.2 % — LOW (ref 42.2–75.2)
NRBC # BLD: 0 /100 WBCS — SIGNIFICANT CHANGE UP (ref 0–0)
PHOSPHATE SERPL-MCNC: 3 MG/DL — SIGNIFICANT CHANGE UP (ref 2.1–4.9)
PLATELET # BLD AUTO: 216 K/UL — SIGNIFICANT CHANGE UP (ref 130–400)
POTASSIUM SERPL-MCNC: 4.3 MMOL/L — SIGNIFICANT CHANGE UP (ref 3.5–5)
POTASSIUM SERPL-SCNC: 4.3 MMOL/L — SIGNIFICANT CHANGE UP (ref 3.5–5)
PROT SERPL-MCNC: 7 G/DL — SIGNIFICANT CHANGE UP (ref 6–8)
RBC # BLD: 5.39 M/UL — SIGNIFICANT CHANGE UP (ref 4.7–6.1)
RBC # FLD: 13.1 % — SIGNIFICANT CHANGE UP (ref 11.5–14.5)
SODIUM SERPL-SCNC: 142 MMOL/L — SIGNIFICANT CHANGE UP (ref 135–146)
TROPONIN T SERPL-MCNC: <0.01 NG/ML — SIGNIFICANT CHANGE UP
WBC # BLD: 5.85 K/UL — SIGNIFICANT CHANGE UP (ref 4.8–10.8)
WBC # FLD AUTO: 5.85 K/UL — SIGNIFICANT CHANGE UP (ref 4.8–10.8)

## 2019-11-30 PROCEDURE — 99223 1ST HOSP IP/OBS HIGH 75: CPT

## 2019-11-30 RX ADMIN — SACUBITRIL AND VALSARTAN 1 TABLET(S): 24; 26 TABLET, FILM COATED ORAL at 17:31

## 2019-11-30 RX ADMIN — Medication 40 MILLIGRAM(S): at 06:23

## 2019-11-30 RX ADMIN — SACUBITRIL AND VALSARTAN 1 TABLET(S): 24; 26 TABLET, FILM COATED ORAL at 06:24

## 2019-11-30 RX ADMIN — Medication 81 MILLIGRAM(S): at 11:39

## 2019-11-30 RX ADMIN — CARVEDILOL PHOSPHATE 3.12 MILLIGRAM(S): 80 CAPSULE, EXTENDED RELEASE ORAL at 06:24

## 2019-11-30 RX ADMIN — CARVEDILOL PHOSPHATE 3.12 MILLIGRAM(S): 80 CAPSULE, EXTENDED RELEASE ORAL at 17:30

## 2019-11-30 RX ADMIN — Medication 40 MILLIGRAM(S): at 17:31

## 2019-11-30 NOTE — PROGRESS NOTE ADULT - SUBJECTIVE AND OBJECTIVE BOX
BASHIR ALCOCER 44y Male  MRN#: [id number]         SUBJECTIVE  Patient is a 44y old Male who presents with a chief complaint of CP r/o ACS (29 Nov 2019 20:30)  Currently admitted to medicine with the primary diagnosis of Chest pain    Today is hospital day 1d, and this morning pt is resting comfortably.       OBJECTIVE  PAST MEDICAL & SURGICAL HISTORY  HTN (hypertension)  CHF (congestive heart failure): EF of 20-25%  No significant past surgical history    ALLERGIES:  No Known Allergies    MEDICATIONS:  STANDING MEDICATIONS  aspirin enteric coated 81 milliGRAM(s) Oral daily  carvedilol 3.125 milliGRAM(s) Oral every 12 hours  furosemide    Tablet 40 milliGRAM(s) Oral two times a day  influenza   Vaccine 0.5 milliLiter(s) IntraMuscular once  sacubitril 24 mG/valsartan 26 mG 1 Tablet(s) Oral two times a day    PRN MEDICATIONS      Home Medications:      VITAL SIGNS: Last 24 Hours  T(C): 36.3 (30 Nov 2019 05:34), Max: 36.9 (29 Nov 2019 17:24)  T(F): 97.4 (30 Nov 2019 05:34), Max: 98.5 (29 Nov 2019 17:24)  HR: 73 (30 Nov 2019 05:34) (73 - 95)  BP: 101/56 (30 Nov 2019 05:34) (101/56 - 132/78)  BP(mean): --  RR: 19 (30 Nov 2019 05:34) (18 - 19)  SpO2: 97% (29 Nov 2019 21:27) (97% - 100%)    LABS:                        15.1   5.85  )-----------( 216      ( 30 Nov 2019 07:50 )             46.9     11-30    142  |  102  |  12  ----------------------------<  128<H>  4.3   |  29  |  1.0    Ca    9.5      30 Nov 2019 07:50  Phos  3.0     11-30  Mg     2.0     11-30    TPro  7.0  /  Alb  4.2  /  TBili  0.7  /  DBili  x   /  AST  18  /  ALT  31  /  AlkPhos  79  11-30    PT/INR - ( 29 Nov 2019 17:55 )   PT: 12.70 sec;   INR: 1.11 ratio         PTT - ( 29 Nov 2019 17:55 )  PTT:26.9 sec      Creatine Kinase, Serum: 103 U/L (11-30-19 @ 07:50)  Troponin T, Serum: <0.01 ng/mL (11-30-19 @ 07:50)  Troponin T, Serum: <0.01 ng/mL (11-29-19 @ 17:55)      CARDIAC MARKERS ( 30 Nov 2019 07:50 )  x     / <0.01 ng/mL / 103 U/L / x     / 1.8 ng/mL  CARDIAC MARKERS ( 29 Nov 2019 17:55 )  x     / <0.01 ng/mL / x     / x     / x          I&O's Summary    30 Nov 2019 07:01  -  30 Nov 2019 11:47  --------------------------------------------------------  IN: 330 mL / OUT: 0 mL / NET: 330 mL          PHYSICAL EXAM:    General: Not in distress.   HEENT: Moist mucus membranes. PERRLA.  Cardio: Regular rate and rhythm, S1, S2, no murmur, rub, or gallop.  Pulm: Clear to auscultation bilaterally. No wheezing, or rhonchi  Abdomen: Soft, non-tender, non-distended. Normoactive bowel sounds.  Extremities: No cyanosis or edema bilaterally. No calf tenderness to palpation.  Neuro: A&O x3. No focal deficits.       RADIOLOGY:    < from: Xray Chest 2 Views PA/Lat (11.29.19 @ 18:37) >  Impression:     No radiographic evidence of acute cardiopulmonary disease.    < end of copied text >

## 2019-11-30 NOTE — PROGRESS NOTE ADULT - ASSESSMENT
43 yo M with PMH of HFrEF (30%) and HTN presented to ED complaining of chest pain.     #) Chest pain r/o ACS  - trops -ve x2, no acute EKG changes  - cath from Dec 2018 = non-obstructive CAD  - F/u cardio eval; Dr. Mars    #) HFrEF - appears euvolemic on exam, will c/w Lasix at home dose for now 40 PO BID, c/w Coreg + Entresto    DVT ppx: Lovenox subQ  Diet: DASH  Activity: AAT  Code status: FULL  Dispo: anticipate home when cleared by Cardio

## 2019-11-30 NOTE — PROGRESS NOTE ADULT - ATTENDING COMMENTS
pt seen and examined at bedside. states that his symptoms have not improved at all and wishes to see cardiology today    #chest pain rule out ACS  EKG normal  trops negative x2  -pending cardiology eval today  -nitro PRN  -cont ASA  -cont telemetry monitoring     #chronic HFrEF  euvolemic on exam   endorses CHAMBERLAIN   -cont lasix 40mg PO q12h  -cont coreg and entresto   -daily weights  -I/Os    Progress Note Handoff  Pending:  cardiology  Patient/Family discussion: no questions at this time pt wishes to see cardiology prior to discharge   Disposition: home when cleared by cardiology

## 2019-12-01 ENCOUNTER — TRANSCRIPTION ENCOUNTER (OUTPATIENT)
Age: 44
End: 2019-12-01

## 2019-12-01 VITALS
DIASTOLIC BLOOD PRESSURE: 67 MMHG | HEART RATE: 98 BPM | TEMPERATURE: 99 F | SYSTOLIC BLOOD PRESSURE: 106 MMHG | RESPIRATION RATE: 18 BRPM

## 2019-12-01 DIAGNOSIS — M79.671 PAIN IN RIGHT FOOT: ICD-10-CM

## 2019-12-01 LAB
ALBUMIN SERPL ELPH-MCNC: 4.3 G/DL — SIGNIFICANT CHANGE UP (ref 3.5–5.2)
ALP SERPL-CCNC: 84 U/L — SIGNIFICANT CHANGE UP (ref 30–115)
ALT FLD-CCNC: 31 U/L — SIGNIFICANT CHANGE UP (ref 0–41)
ANION GAP SERPL CALC-SCNC: 14 MMOL/L — SIGNIFICANT CHANGE UP (ref 7–14)
AST SERPL-CCNC: 19 U/L — SIGNIFICANT CHANGE UP (ref 0–41)
BILIRUB SERPL-MCNC: 0.5 MG/DL — SIGNIFICANT CHANGE UP (ref 0.2–1.2)
BUN SERPL-MCNC: 14 MG/DL — SIGNIFICANT CHANGE UP (ref 10–20)
CALCIUM SERPL-MCNC: 9.6 MG/DL — SIGNIFICANT CHANGE UP (ref 8.5–10.1)
CHLORIDE SERPL-SCNC: 99 MMOL/L — SIGNIFICANT CHANGE UP (ref 98–110)
CO2 SERPL-SCNC: 28 MMOL/L — SIGNIFICANT CHANGE UP (ref 17–32)
CREAT SERPL-MCNC: 1.1 MG/DL — SIGNIFICANT CHANGE UP (ref 0.7–1.5)
GLUCOSE SERPL-MCNC: 174 MG/DL — HIGH (ref 70–99)
HCT VFR BLD CALC: 47.9 % — SIGNIFICANT CHANGE UP (ref 42–52)
HGB BLD-MCNC: 15.4 G/DL — SIGNIFICANT CHANGE UP (ref 14–18)
MCHC RBC-ENTMCNC: 28.1 PG — SIGNIFICANT CHANGE UP (ref 27–31)
MCHC RBC-ENTMCNC: 32.2 G/DL — SIGNIFICANT CHANGE UP (ref 32–37)
MCV RBC AUTO: 87.2 FL — SIGNIFICANT CHANGE UP (ref 80–94)
NRBC # BLD: 0 /100 WBCS — SIGNIFICANT CHANGE UP (ref 0–0)
PLATELET # BLD AUTO: 224 K/UL — SIGNIFICANT CHANGE UP (ref 130–400)
POTASSIUM SERPL-MCNC: 4.4 MMOL/L — SIGNIFICANT CHANGE UP (ref 3.5–5)
POTASSIUM SERPL-SCNC: 4.4 MMOL/L — SIGNIFICANT CHANGE UP (ref 3.5–5)
PROT SERPL-MCNC: 7.3 G/DL — SIGNIFICANT CHANGE UP (ref 6–8)
RBC # BLD: 5.49 M/UL — SIGNIFICANT CHANGE UP (ref 4.7–6.1)
RBC # FLD: 13.1 % — SIGNIFICANT CHANGE UP (ref 11.5–14.5)
SODIUM SERPL-SCNC: 141 MMOL/L — SIGNIFICANT CHANGE UP (ref 135–146)
WBC # BLD: 8.02 K/UL — SIGNIFICANT CHANGE UP (ref 4.8–10.8)
WBC # FLD AUTO: 8.02 K/UL — SIGNIFICANT CHANGE UP (ref 4.8–10.8)

## 2019-12-01 PROCEDURE — 99239 HOSP IP/OBS DSCHRG MGMT >30: CPT

## 2019-12-01 RX ORDER — ASPIRIN/CALCIUM CARB/MAGNESIUM 324 MG
1 TABLET ORAL
Qty: 30 | Refills: 0
Start: 2019-12-01 | End: 2019-12-30

## 2019-12-01 RX ORDER — SACUBITRIL AND VALSARTAN 24; 26 MG/1; MG/1
1 TABLET, FILM COATED ORAL
Qty: 60 | Refills: 0
Start: 2019-12-01 | End: 2019-12-30

## 2019-12-01 RX ORDER — FUROSEMIDE 40 MG
1 TABLET ORAL
Qty: 60 | Refills: 0 | DISCHARGE
Start: 2019-12-01 | End: 2019-12-30

## 2019-12-01 RX ORDER — CARVEDILOL PHOSPHATE 80 MG/1
1 CAPSULE, EXTENDED RELEASE ORAL
Qty: 60 | Refills: 0
Start: 2019-12-01 | End: 2019-12-30

## 2019-12-01 RX ORDER — FUROSEMIDE 40 MG
1 TABLET ORAL
Qty: 60 | Refills: 0
Start: 2019-12-01 | End: 2019-12-30

## 2019-12-01 RX ADMIN — SACUBITRIL AND VALSARTAN 1 TABLET(S): 24; 26 TABLET, FILM COATED ORAL at 05:40

## 2019-12-01 RX ADMIN — CARVEDILOL PHOSPHATE 3.12 MILLIGRAM(S): 80 CAPSULE, EXTENDED RELEASE ORAL at 05:40

## 2019-12-01 RX ADMIN — Medication 81 MILLIGRAM(S): at 11:21

## 2019-12-01 RX ADMIN — Medication 40 MILLIGRAM(S): at 05:40

## 2019-12-01 NOTE — PROGRESS NOTE ADULT - ASSESSMENT
#chest pain rule out ACS  EKG normal  trops negative x2  -seen by cardiology and EP- no further inpatient work up need cleared for discharge  -no events on device   -cont ASA      #chronic HFrEF  euvolemic on exam   endorses CHAMBERLAIN unchanged from baseline  -cont lasix 40mg PO q12h  -cont coreg and entresto       Progress Note Handoff  Pending:  cleared by cardio  Pending:  cleared by cardiology pt states he has pain along the right ankle which he has had in the past and no one knows the etiology requesting ortho eval. stated will provide outpt referral as this is a chronic issue  Disposition: home

## 2019-12-01 NOTE — CHART NOTE - NSCHARTNOTEFT_GEN_A_CORE
Electrophysiology PA Note    subcutaneous ICD interrogated  (report in chart)  working properly  no events since last device check 9/13/19

## 2019-12-01 NOTE — DISCHARGE NOTE PROVIDER - NSFOLLOWUPCLINICS_GEN_ALL_ED_FT
[facility] [place]  [facility]  .  [place]   Phone: [phone]  Fax:   Follow Up Time:

## 2019-12-01 NOTE — PROGRESS NOTE ADULT - ASSESSMENT
Patient with noted hx of DCM and AICD>     He had recent AICD placed reassured all is is stable    Pain is atypical.  Pt does not recall stress test or CATH.  seems to have had recent workup.  Will DC since stable.

## 2019-12-01 NOTE — DISCHARGE NOTE NURSING/CASE MANAGEMENT/SOCIAL WORK - PATIENT PORTAL LINK FT
You can access the FollowMyHealth Patient Portal offered by [facility] by registering at the following website: [email or web address]. By joining [facility] system’s FollowMyHealth portal, you will also be able to view your health information using other applications (apps) compatible with our system.

## 2019-12-01 NOTE — PROGRESS NOTE ADULT - SUBJECTIVE AND OBJECTIVE BOX
LEODANMATTASHLEY  44y, Male  Allergy: No Known Allergies  Hospital Day: 2d      Patient was seen and examined at bedside. complaining for chronic right ankle pain says he has been worked up before but "no one knows what it is"          VITALS:  T(F): 99.3 (12-01-19 @ 13:29), Max: 99.3 (12-01-19 @ 13:29)  HR: 98 (12-01-19 @ 13:29)  BP: 106/67 (12-01-19 @ 13:29) (99/56 - 131/81)  RR: 18 (12-01-19 @ 13:29)  SpO2: 95% (11-30-19 @ 20:00)    PHYSICAL EXAM:  GENERAL: well developed well nourished in no acute distress  NECK: No evidence of swelling no palpable lymphadenopathy  CHEST/LUNG: clear to ausculation bilaterally no wheezes, rales, or rhonchi   HEART/VASC: RRR, No murmurs, rubs, or gallops appreciated, 2+ equal bilateral radial pulse  ABDOMEN: Soft, non tender non distended +bowel sounds in all quadrants, no palpable organomegaly   EXTREMITIES:  No clubbing and range of motion intact.     TESTS & MEASUREMENTS:  Weight (Kg): 145.9 (11-29-19 @ 21:25)  BMI (kg/m2): 44.9 (11-29)    11-30-19 @ 07:01  -  12-01-19 @ 07:00  --------------------------------------------------------  IN: 730 mL / OUT: 0 mL / NET: 730 mL    12-01-19 @ 07:01  -  12-01-19 @ 14:55  --------------------------------------------------------  IN: 720 mL / OUT: 0 mL / NET: 720 mL                            15.4   8.02  )-----------( 224      ( 01 Dec 2019 08:41 )             47.9     PT/INR - ( 29 Nov 2019 17:55 )   PT: 12.70 sec;   INR: 1.11 ratio         PTT - ( 29 Nov 2019 17:55 )  PTT:26.9 sec  12-01    141  |  99  |  14  ----------------------------<  174<H>  4.4   |  28  |  1.1    Ca    9.6      01 Dec 2019 08:41  Phos  3.0     11-30  Mg     2.0     11-30    TPro  7.3  /  Alb  4.3  /  TBili  0.5  /  DBili  x   /  AST  19  /  ALT  31  /  AlkPhos  84  12-01    LIVER FUNCTIONS - ( 01 Dec 2019 08:41 )  Alb: 4.3 g/dL / Pro: 7.3 g/dL / ALK PHOS: 84 U/L / ALT: 31 U/L / AST: 19 U/L / GGT: x           CARDIAC MARKERS ( 30 Nov 2019 07:50 )  x     / <0.01 ng/mL / 103 U/L / x     / 1.8 ng/mL  CARDIAC MARKERS ( 29 Nov 2019 17:55 )  x     / <0.01 ng/mL / x     / x     / x        MEDICATIONS:  MEDICATIONS  (STANDING):  aspirin enteric coated 81 milliGRAM(s) Oral daily  carvedilol 3.125 milliGRAM(s) Oral every 12 hours  furosemide    Tablet 40 milliGRAM(s) Oral two times a day  influenza   Vaccine 0.5 milliLiter(s) IntraMuscular once  sacubitril 24 mG/valsartan 26 mG 1 Tablet(s) Oral two times a day

## 2019-12-01 NOTE — DISCHARGE NOTE PROVIDER - NSDCCPCAREPLAN_GEN_ALL_CORE_FT
PRINCIPAL DISCHARGE DIAGNOSIS  Diagnosis: Chest pain  Assessment and Plan of Treatment: You were monitored on a cardiac monitor throughout your hospital course and no events were oberseved. You were also seen and cleared by cardiology to follow up as an outpatient in [facility] clinic for continuity of care. All your medications have been refilled and you are deemed medically stable for discharge

## 2019-12-01 NOTE — DISCHARGE NOTE PROVIDER - CARE PROVIDER_API CALL
Syed Young)  Cardiovascular Disease; Interventional Cardiology  [address] [place] [address]  Phone: [phone]  Fax: [phone]  Established Patient  Follow Up Time: 2 weeks

## 2019-12-01 NOTE — PROGRESS NOTE ADULT - SUBJECTIVE AND OBJECTIVE BOX
CHIEF COMPLAINT:Patient is a 44y old  Male who presents with a chief complaint of CP r/o ACS (2019 11:47)      HISTORY OF PRESENT ILLNESS:   HPI:  43 yo M with PMH of HFrEF (30%) and HTN presented to ED complaining of chest pain. Describes it as sudden onset, substernal, tightness, non-radiating, worse with exertion, improved with rest, with associated SOB. Started while walking up stairs. Has history of similar chest pain but never as severe. Also reports crampy RLE pain, intermittent in nature. Denies fevers, chills, N/V/C/D, sick contacts, or other issues. Cardio = Dr Patle. Recent admission to [facility] in 2019 for similar issue.    In ED, trops were negative, no EKG changes. Admitted to r/o ACS. Deemed too high risk for OBS. (2019 20:30)    Patient had recent cardiac CT and MRI in  with asc aorta 4.7 cm.     CP is atypical.  CE are negative.  ECG no interval change.      PAST MEDICAL & SURGICAL HISTORY:  HTN (hypertension)  CHF (congestive heart failure): EF of 20-25%  No significant past surgical history    FAMILY HISTORY:  Family history of heart disease (Father)  Family history of diabetes mellitus (Mother)    Allergies    No Known Allergies    Intolerances    	  Home Medications:    MEDICATIONS  (STANDING):  aspirin enteric coated 81 milliGRAM(s) Oral daily  carvedilol 3.125 milliGRAM(s) Oral every 12 hours  furosemide    Tablet 40 milliGRAM(s) Oral two times a day  influenza   Vaccine 0.5 milliLiter(s) IntraMuscular once  sacubitril 24 mG/valsartan 26 mG 1 Tablet(s) Oral two times a day    MEDICATIONS  (PRN):        SOCIAL HISTORY:    [ ] Non-smoker  [ ] Smoker  [ ] Alcohol      REVIEW OF SYSTEMS:    PHYSICAL EXAM:  T(C): 36.3 (19 @ 05:19), Max: 36.6 (19 @ 13:20)  HR: 88 (19 @ 05:19) (81 - 97)  BP: 112/64 (19 @ 05:19) (99/56 - 131/81)  RR: 18 (19 @ 05:19) (18 - 18)  SpO2: 95% (19 @ 20:00) (95% - 95%)  Wt(kg): --  I&O's Summary    2019 07:  -  01 Dec 2019 07:00  --------------------------------------------------------  IN: 730 mL / OUT: 0 mL / NET: 730 mL    01 Dec 2019 07:  -  01 Dec 2019 11:52  --------------------------------------------------------  IN: 360 mL / OUT: 0 mL / NET: 360 mL      Daily     Daily Weight in k.4 (01 Dec 2019 05:19)    General Appearance: Normal	  Cardiovascular: Normal S1 S2, No JVD, No murmurs, No edema  Respiratory: Lungs clear to auscultation	  Psychiatry: A & O x 3, Mood & affect appropriate  Gastrointestinal:  Soft, Non-tender  Skin: No rashes, No ecchymoses, No cyanosis	  Neurologic: Non-focal  Extremities: Normal range of motion, No clubbing, cyanosis or edema  Vascular: Peripheral pulses palpable 2+ bilaterally        LABS:	 	                        15.4   8.02  )-----------( 224      ( 01 Dec 2019 08:41 )             47.9         141  |  99  |  14  ----------------------------<  174<H>  4.4   |  28  |  1.1      142  |  102  |  12  ----------------------------<  128<H>  4.3   |  29  |  1.0    Ca    9.6      01 Dec 2019 08:41  Ca    9.5      2019 07:50  Phos  3.0       Mg     2.0         TPro  7.3  /  Alb  4.3  /  TBili  0.5  /  DBili  x   /  AST  19  /  ALT  31  /  AlkPhos  84  12  TPro  7.0  /  Alb  4.2  /  TBili  0.7  /  DBili  x   /  AST  18  /  ALT  31  /  AlkPhos  79        proBNP:   Lipid Profile:   HgA1c:   TSH:       CARDIAC MARKERS:            TELEMETRY EVENTS: 	    ECG:  	  RADIOLOGY:  	    PREVIOUS DIAGNOSTIC TESTING:    [ ] Echocardiogram:  [ ]  Catheterization:  [ ] Stress Test:

## 2019-12-01 NOTE — DISCHARGE NOTE PROVIDER - HOSPITAL COURSE
45 yo M with PMH of HFrEF (30%) and HTN presented to ED complaining of chest pain. Describes it as sudden onset, substernal, tightness, non-radiating, worse with exertion, improved with rest, with associated SOB. Started while walking up stairs. Has history of similar chest pain but never as severe. Also reports crampy RLE pain, intermittent in nature. Denies fevers, chills, N/V/C/D, sick contacts, or other issues. Cardio = Dr Patel. Recent admission to [facility] in Sept 2019 for similar issue. In ED, trops were negative, no EKG changes. Admitted to r/o ACS. Deemed too high risk for OBS. (29 Nov 2019 20:30) Patient had recent cardiac CT and MRI in 9/19 with asc aorta 4.7 cm. CP is atypical.  CE are negative.  ECG no interval change.  Patient was seen by cardiology and EP. AICD was interrogated which showed no events. Patient was cleared to follow up as outpatient for further workup with cardiology.

## 2019-12-01 NOTE — DISCHARGE NOTE PROVIDER - NSDCMRMEDTOKEN_GEN_ALL_CORE_FT
aspirin 81 mg oral delayed release tablet: 1 tab(s) orally once a day  carvedilol 3.125 mg oral tablet: 1 tab(s) orally every 12 hours  furosemide 40 mg oral tablet: 1 tab(s) orally 2 times a day  sacubitril-valsartan 24 mg-26 mg oral tablet: 1 tab(s) orally 2 times a day aspirin 81 mg oral delayed release tablet: 1 tab(s) orally once a day  carvedilol 3.125 mg oral tablet: 1 tab(s) orally every 12 hours  furosemide 40 mg oral tablet: 1 tab(s) orally 2 times a day  sacubitril-valsartan 49 mg-51 mg oral tablet: 1 tab(s) orally 2 times a day

## 2019-12-05 DIAGNOSIS — I42.9 CARDIOMYOPATHY, UNSPECIFIED: ICD-10-CM

## 2019-12-05 DIAGNOSIS — I11.0 HYPERTENSIVE HEART DISEASE WITH HEART FAILURE: ICD-10-CM

## 2019-12-05 DIAGNOSIS — Z79.82 LONG TERM (CURRENT) USE OF ASPIRIN: ICD-10-CM

## 2019-12-05 DIAGNOSIS — R07.9 CHEST PAIN, UNSPECIFIED: ICD-10-CM

## 2019-12-05 DIAGNOSIS — I50.22 CHRONIC SYSTOLIC (CONGESTIVE) HEART FAILURE: ICD-10-CM

## 2019-12-05 DIAGNOSIS — R07.89 OTHER CHEST PAIN: ICD-10-CM

## 2019-12-23 ENCOUNTER — APPOINTMENT (OUTPATIENT)
Dept: CARDIOLOGY | Facility: CLINIC | Age: 44
End: 2019-12-23
Payer: MEDICAID

## 2019-12-23 PROCEDURE — 93295 DEV INTERROG REMOTE 1/2/MLT: CPT

## 2019-12-23 PROCEDURE — 93296 REM INTERROG EVL PM/IDS: CPT

## 2020-01-14 ENCOUNTER — CLINICAL ADVICE (OUTPATIENT)
Age: 45
End: 2020-01-14

## 2020-03-26 ENCOUNTER — APPOINTMENT (OUTPATIENT)
Dept: CARDIOLOGY | Facility: CLINIC | Age: 45
End: 2020-03-26

## 2020-05-05 ENCOUNTER — APPOINTMENT (OUTPATIENT)
Dept: CARDIOLOGY | Facility: CLINIC | Age: 45
End: 2020-05-05
Payer: MEDICAID

## 2020-05-05 PROCEDURE — 93296 REM INTERROG EVL PM/IDS: CPT

## 2020-05-05 PROCEDURE — 93295 DEV INTERROG REMOTE 1/2/MLT: CPT

## 2020-06-09 NOTE — ED PROVIDER NOTE - CONDUCTED A DETAILED DISCUSSION WITH PATIENT AND/OR GUARDIAN REGARDING, MDM
No foreign body
radiology results/need for outpatient follow-up/lab results/return to ED if symptoms worsen, persist or questions arise

## 2020-08-04 ENCOUNTER — APPOINTMENT (OUTPATIENT)
Dept: CARDIOLOGY | Facility: CLINIC | Age: 45
End: 2020-08-04

## 2020-08-10 ENCOUNTER — INPATIENT (INPATIENT)
Facility: HOSPITAL | Age: 45
LOS: 1 days | Discharge: HOME | End: 2020-08-12
Attending: INTERNAL MEDICINE | Admitting: INTERNAL MEDICINE
Payer: MEDICAID

## 2020-08-10 VITALS
OXYGEN SATURATION: 99 % | RESPIRATION RATE: 19 BRPM | DIASTOLIC BLOOD PRESSURE: 87 MMHG | HEART RATE: 82 BPM | SYSTOLIC BLOOD PRESSURE: 144 MMHG | TEMPERATURE: 98 F

## 2020-08-10 DIAGNOSIS — Z95.810 PRESENCE OF AUTOMATIC (IMPLANTABLE) CARDIAC DEFIBRILLATOR: Chronic | ICD-10-CM

## 2020-08-10 LAB
ALBUMIN SERPL ELPH-MCNC: 4.1 G/DL — SIGNIFICANT CHANGE UP (ref 3.5–5.2)
ALP SERPL-CCNC: 85 U/L — SIGNIFICANT CHANGE UP (ref 30–115)
ALT FLD-CCNC: 25 U/L — SIGNIFICANT CHANGE UP (ref 0–41)
ANION GAP SERPL CALC-SCNC: 9 MMOL/L — SIGNIFICANT CHANGE UP (ref 7–14)
APTT BLD: 27.5 SEC — SIGNIFICANT CHANGE UP (ref 27–39.2)
AST SERPL-CCNC: 20 U/L — SIGNIFICANT CHANGE UP (ref 0–41)
BASOPHILS # BLD AUTO: 0.06 K/UL — SIGNIFICANT CHANGE UP (ref 0–0.2)
BASOPHILS NFR BLD AUTO: 0.9 % — SIGNIFICANT CHANGE UP (ref 0–1)
BILIRUB SERPL-MCNC: 0.4 MG/DL — SIGNIFICANT CHANGE UP (ref 0.2–1.2)
BUN SERPL-MCNC: 15 MG/DL — SIGNIFICANT CHANGE UP (ref 10–20)
CALCIUM SERPL-MCNC: 9.4 MG/DL — SIGNIFICANT CHANGE UP (ref 8.5–10.1)
CHLORIDE SERPL-SCNC: 102 MMOL/L — SIGNIFICANT CHANGE UP (ref 98–110)
CO2 SERPL-SCNC: 29 MMOL/L — SIGNIFICANT CHANGE UP (ref 17–32)
CREAT SERPL-MCNC: 1 MG/DL — SIGNIFICANT CHANGE UP (ref 0.7–1.5)
D DIMER BLD IA.RAPID-MCNC: 69 NG/ML DDU — SIGNIFICANT CHANGE UP (ref 0–230)
EOSINOPHIL # BLD AUTO: 0.08 K/UL — SIGNIFICANT CHANGE UP (ref 0–0.7)
EOSINOPHIL NFR BLD AUTO: 1.2 % — SIGNIFICANT CHANGE UP (ref 0–8)
GLUCOSE SERPL-MCNC: 193 MG/DL — HIGH (ref 70–99)
HCT VFR BLD CALC: 46.6 % — SIGNIFICANT CHANGE UP (ref 42–52)
HGB BLD-MCNC: 15.1 G/DL — SIGNIFICANT CHANGE UP (ref 14–18)
IMM GRANULOCYTES NFR BLD AUTO: 0.4 % — HIGH (ref 0.1–0.3)
INR BLD: 1.03 RATIO — SIGNIFICANT CHANGE UP (ref 0.65–1.3)
LYMPHOCYTES # BLD AUTO: 2.28 K/UL — SIGNIFICANT CHANGE UP (ref 1.2–3.4)
LYMPHOCYTES # BLD AUTO: 33.6 % — SIGNIFICANT CHANGE UP (ref 20.5–51.1)
MAGNESIUM SERPL-MCNC: 1.8 MG/DL — SIGNIFICANT CHANGE UP (ref 1.8–2.4)
MCHC RBC-ENTMCNC: 28.7 PG — SIGNIFICANT CHANGE UP (ref 27–31)
MCHC RBC-ENTMCNC: 32.4 G/DL — SIGNIFICANT CHANGE UP (ref 32–37)
MCV RBC AUTO: 88.6 FL — SIGNIFICANT CHANGE UP (ref 80–94)
MONOCYTES # BLD AUTO: 0.59 K/UL — SIGNIFICANT CHANGE UP (ref 0.1–0.6)
MONOCYTES NFR BLD AUTO: 8.7 % — SIGNIFICANT CHANGE UP (ref 1.7–9.3)
NEUTROPHILS # BLD AUTO: 3.75 K/UL — SIGNIFICANT CHANGE UP (ref 1.4–6.5)
NEUTROPHILS NFR BLD AUTO: 55.2 % — SIGNIFICANT CHANGE UP (ref 42.2–75.2)
NRBC # BLD: 0 /100 WBCS — SIGNIFICANT CHANGE UP (ref 0–0)
NT-PROBNP SERPL-SCNC: 131 PG/ML — SIGNIFICANT CHANGE UP (ref 0–300)
PLATELET # BLD AUTO: 193 K/UL — SIGNIFICANT CHANGE UP (ref 130–400)
POTASSIUM SERPL-MCNC: 4.4 MMOL/L — SIGNIFICANT CHANGE UP (ref 3.5–5)
POTASSIUM SERPL-SCNC: 4.4 MMOL/L — SIGNIFICANT CHANGE UP (ref 3.5–5)
PROT SERPL-MCNC: 7.3 G/DL — SIGNIFICANT CHANGE UP (ref 6–8)
PROTHROM AB SERPL-ACNC: 11.8 SEC — SIGNIFICANT CHANGE UP (ref 9.95–12.87)
RBC # BLD: 5.26 M/UL — SIGNIFICANT CHANGE UP (ref 4.7–6.1)
RBC # FLD: 12.9 % — SIGNIFICANT CHANGE UP (ref 11.5–14.5)
SODIUM SERPL-SCNC: 140 MMOL/L — SIGNIFICANT CHANGE UP (ref 135–146)
TROPONIN T SERPL-MCNC: <0.01 NG/ML — SIGNIFICANT CHANGE UP
TROPONIN T SERPL-MCNC: <0.01 NG/ML — SIGNIFICANT CHANGE UP
WBC # BLD: 6.79 K/UL — SIGNIFICANT CHANGE UP (ref 4.8–10.8)
WBC # FLD AUTO: 6.79 K/UL — SIGNIFICANT CHANGE UP (ref 4.8–10.8)

## 2020-08-10 PROCEDURE — 99284 EMERGENCY DEPT VISIT MOD MDM: CPT

## 2020-08-10 PROCEDURE — 71045 X-RAY EXAM CHEST 1 VIEW: CPT | Mod: 26

## 2020-08-10 PROCEDURE — 93010 ELECTROCARDIOGRAM REPORT: CPT

## 2020-08-10 PROCEDURE — 99497 ADVNCD CARE PLAN 30 MIN: CPT | Mod: 25

## 2020-08-10 PROCEDURE — 99223 1ST HOSP IP/OBS HIGH 75: CPT

## 2020-08-10 RX ORDER — FUROSEMIDE 40 MG
40 TABLET ORAL
Refills: 0 | Status: DISCONTINUED | OUTPATIENT
Start: 2020-08-10 | End: 2020-08-12

## 2020-08-10 RX ORDER — METOPROLOL TARTRATE 50 MG
50 TABLET ORAL
Refills: 0 | Status: DISCONTINUED | OUTPATIENT
Start: 2020-08-10 | End: 2020-08-12

## 2020-08-10 RX ORDER — APIXABAN 2.5 MG/1
5 TABLET, FILM COATED ORAL
Refills: 0 | Status: DISCONTINUED | OUTPATIENT
Start: 2020-08-10 | End: 2020-08-12

## 2020-08-10 RX ORDER — ASPIRIN/CALCIUM CARB/MAGNESIUM 324 MG
325 TABLET ORAL ONCE
Refills: 0 | Status: COMPLETED | OUTPATIENT
Start: 2020-08-10 | End: 2020-08-10

## 2020-08-10 RX ORDER — ENOXAPARIN SODIUM 100 MG/ML
40 INJECTION SUBCUTANEOUS DAILY
Refills: 0 | Status: DISCONTINUED | OUTPATIENT
Start: 2020-08-10 | End: 2020-08-10

## 2020-08-10 RX ORDER — SACUBITRIL AND VALSARTAN 24; 26 MG/1; MG/1
1 TABLET, FILM COATED ORAL
Refills: 0 | Status: DISCONTINUED | OUTPATIENT
Start: 2020-08-10 | End: 2020-08-12

## 2020-08-10 RX ORDER — CHLORHEXIDINE GLUCONATE 213 G/1000ML
1 SOLUTION TOPICAL
Refills: 0 | Status: DISCONTINUED | OUTPATIENT
Start: 2020-08-10 | End: 2020-08-12

## 2020-08-10 RX ADMIN — Medication 50 MILLIGRAM(S): at 17:36

## 2020-08-10 RX ADMIN — APIXABAN 5 MILLIGRAM(S): 2.5 TABLET, FILM COATED ORAL at 17:36

## 2020-08-10 RX ADMIN — SACUBITRIL AND VALSARTAN 1 TABLET(S): 24; 26 TABLET, FILM COATED ORAL at 17:36

## 2020-08-10 RX ADMIN — Medication 325 MILLIGRAM(S): at 15:28

## 2020-08-10 RX ADMIN — Medication 40 MILLIGRAM(S): at 17:36

## 2020-08-10 NOTE — H&P ADULT - NSHPSOCIALHISTORY_GEN_ALL_CORE
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

## 2020-08-10 NOTE — ED PROVIDER NOTE - RESPIRATORY, MLM
Breath sounds clear and equal bilaterally. Epidermal Closure Graft Donor Site (Optional): simple interrupted

## 2020-08-10 NOTE — H&P ADULT - NSHPPHYSICALEXAM_GEN_ALL_CORE
CONSTITUTIONAL: No acute distress, well-developed, well-groomed, AAOx3  HEAD: Atraumatic, normocephalic  EYES: EOM intact, PERRLA, conjunctiva and sclera clear  ENT: Supple, no masses, no thyromegaly, no bruits, no JVD; moist mucous membranes  PULMONARY: Clear to auscultation bilaterally; no wheezes, rales, or rhonchi  CARDIOVASCULAR: Regular rate and rhythm; no murmurs, rubs, or gallops  GASTROINTESTINAL: Soft, non-tender, non-distended; bowel sounds present  MUSCULOSKELETAL: 2+ peripheral pulses; no clubbing, no cyanosis, no edema  NEUROLOGY: non-focal  SKIN: No rashes or lesions; warm and dry CONSTITUTIONAL: No acute distress, obese, well-groomed, AAOx3  HEAD: Atraumatic, normocephalic  EYES: EOM intact, PERRLA, conjunctiva and sclera clear  ENT: Supple, no masses, no thyromegaly, no bruits, no JVD; moist mucous membranes  PULMONARY: Clear to auscultation bilaterally; no wheezes, rales, or rhonchi  CARDIOVASCULAR: Regular rate and rhythm; no murmurs, rubs, or gallops  GASTROINTESTINAL: Soft, non-tender, non-distended; bowel sounds present  MUSCULOSKELETAL: 2+ peripheral pulses; no clubbing, no cyanosis, no edema  NEUROLOGY: decreased sensation over right anterior thigh  SKIN: No rashes or lesions; warm and dry

## 2020-08-10 NOTE — H&P ADULT - NSICDXPASTMEDICALHX_GEN_ALL_CORE_FT
PAST MEDICAL HISTORY:  Chronic HFrEF (heart failure with reduced ejection fraction)     HTN (hypertension) PAST MEDICAL HISTORY:  Atrial fibrillation     Chronic HFrEF (heart failure with reduced ejection fraction)     History of abdominal aortic aneurysm     HTN (hypertension)

## 2020-08-10 NOTE — ED ADULT NURSE NOTE - OBJECTIVE STATEMENT
Pt. came to ED for chest pain and sob that started this morning at 0200. Has never had this pain before. Denies n/v/d, chills, fever.

## 2020-08-10 NOTE — H&P ADULT - NSHPLABSRESULTS_GEN_ALL_CORE
15.1   6.79  )-----------( 193      ( 10 Aug 2020 12:15 )             46.6     08-10    140  |  102  |  15  ----------------------------<  193<H>  4.4   |  29  |  1.0    Ca    9.4      10 Aug 2020 12:15  Mg     1.8     08-10    TPro  7.3  /  Alb  4.1  /  TBili  0.4  /  DBili  x   /  AST  20  /  ALT  25  /  AlkPhos  85  08-10    PT/INR - ( 10 Aug 2020 12:15 )   PT: 11.80 sec;   INR: 1.03 ratio         PTT - ( 10 Aug 2020 12:15 )  PTT:27.5 sec      Troponin T, Serum: <0.01 ng/mL (08-10-20 @ 12:15)      CARDIAC MARKERS ( 10 Aug 2020 12:15 )  x     / <0.01 ng/mL / x     / x     / x        < from: Xray Chest 1 View AP/PA (08.10.20 @ 14:18) >  Findings:    Support devices: There is a partially imaged pacemaker lead. Telemetry leads overlie patient.  Cardiac/mediastinum/hilum: Heart and mediastinum are prominent, limited in evaluation due to AP portable technique and low lung volumes  Lung parenchyma/Pleura: Within normal limits.  Skeleton/soft tissues: Unremarkable for age    Impression:  No radiographic evidence of acute cardiopulmonary disease.  < end of copied text >

## 2020-08-10 NOTE — ED PROVIDER NOTE - OBJECTIVE STATEMENT
46 y/o male Atrial fibrillation, CHF, Ascending aorta aneurysm, AICD non-compliant medications presents to the ED c/o "I have SOB, chest tightness, palpitations on-off for 1 week. Sometimes I have left arm tingling." no cough/ fever/ chills

## 2020-08-10 NOTE — H&P ADULT - NSHPREVIEWOFSYSTEMS_GEN_ALL_CORE
CONSTITUTIONAL: No weakness, fevers or chills; No headaches  EYES: No visual changes, eye pain, or discharge  ENT: No vertigo; No ear pain or change in hearing; No sore throat or difficulty swallowing  NECK: No pain or stiffness  RESPIRATORY: No cough, wheezing, or hemoptysis; No shortness of breath  CARDIOVASCULAR: No chest pain or palpitations  GASTROINTESTINAL: No abdominal or epigastric pain; No nausea, vomiting, or hematemesis; No diarrhea or constipation; No melena or hematochezia  GENITOURINARY: No dysuria, frequency or hematuria  MUSCULOSKELETAL: No joint pain, no muscle pain, no weakness  NEUROLOGICAL: No numbness or weakness  SKIN: No itching or rashes CONSTITUTIONAL: No weakness, fevers or chills; (+) headache  EYES: No visual changes, eye pain, or discharge  ENT: No vertigo; No ear pain or change in hearing; No sore throat or difficulty swallowing  NECK: No pain or stiffness  RESPIRATORY: No cough, wheezing, or hemoptysis; (+) shortness of breath  CARDIOVASCULAR: (+) sharp left-sided chest pain that radiates down left arm, (+) palpitations, (+) dyspnea on exertion, (+) orthopnea  GASTROINTESTINAL: No abdominal or epigastric pain; No nausea, vomiting, or hematemesis; No diarrhea or constipation; No melena or hematochezia  GENITOURINARY: No dysuria, frequency or hematuria  MUSCULOSKELETAL: No joint pain, no muscle pain, no weakness  NEUROLOGICAL: (+) numbness over right anterior thigh  SKIN: No itching or rashes

## 2020-08-10 NOTE — ED PROVIDER NOTE - ATTENDING CONTRIBUTION TO CARE
46 y/o male h/o HTN CHF (EF 20-30%) with AICD, AAA, afib previously on Eliquis, but non-compliant with medication x approx 1 month, now presents with left sided CP x last night, described as tightness, sx are intermittent, radiates to left shoulder, denies modifying factors, associated CHAMBERLAIN, palpitations (described as regular fast HB), denies exertional chest pain, fever, nausea, vomiting, diaphoresis, hemoptysis, orthopnea, PND, LE pain or swelling, recent immobilization or travel or other associated complaints at present.    Previous cardiac evaluation; Followed by Dr Mars, last seen approx 7 m/a.    VSS, awake, alert, non-toxic appearing, lying comfortably in stretcher, in NAD, oropharynx clear, mmm, no JVD or bruit, no skin rash or lesions, chest CTAB, non-labored breathing, no w/r/r, +S1/S2, RRR, no m/r/g, abdomen soft, NT, ND, +BS, no peripheral edema or deformities, equal pulses upper and lower extremities, alert, clear speech and steady gait.

## 2020-08-10 NOTE — H&P ADULT - ATTENDING COMMENTS
44 YO M with a PMH of HFrEF s/p AICD, AAA (4.7cm), HTN, and Afib (no AC currently) who presents to the hospital with a c/o CP for the past x 1 day. Described as sharp, substernal, non-radiating, and intermittent. Associated with increasing CHAMBERLAIN for the past x 1 week. Comes on randomly and not associate with exertion. Denies any LE swelling, diaphoresis, N/V, fevers/chills, or cough. Of note, the pt has been out of his medications for the past x1 month due to insurance issues. In the ED, pt started on ASA. The pts cardiac enzymes and EKGs were without ischemic changes.     Physical exam shows obese pt in NAD. VSS, afebrile, not hypoxic on RA. A&Ox3. Non-focal neuro exam. Muscle strength/sensation intact. CTA B/L with no W/C/R. RRR, no M/G/R. ABD is obese, soft and non-tender, normoactive BSs. LEs without swelling. No rashes. Labs and radiology as above.     Chest pain, atypical, rule out ACS. Telem admit. Serial cardiac enzymes and EKGs. PRN pain management. ASA given in ED. Echo. Cardio consult. A1c, TSH, and Lipid panel. Restart home meds.     Hyperglycemia. A1c. FSs. Insulin PRN.    Hx of HFrEF s/p AICD, AAA (4.7cm), HTN, and Afib (no AC currently). Restart home meds. DVT PPX. Inform PCP of pt's admission to hospital. My note supersedes the residents note.

## 2020-08-10 NOTE — H&P ADULT - ASSESSMENT
Patient is a 46yo male with PMH of heart failure with reduced ejection fraction (EF 30%) s/p AICD, abdominal aortic aneurysm (4.7cm), and atrial fibrillation not on anticoagulation who presented to the ED complaining of dyspnea on exertion. Patient is a 46yo male with PMH of heart failure with reduced ejection fraction (EF 30%) s/p AICD, abdominal aortic aneurysm (4.7cm), and atrial fibrillation not on anticoagulation who presented to the ED complaining of dyspnea on exertion.    #Atypical chest pain    #Hyperglycemia  - Noted on CMP on admission  - Follow hemoglobin A1c    #Heart failure with reduced ejection fraction s/p AICD, stable  - Echo 9/11/2019: EF 30-35%, grade II diastolic dysfunction, mild MR, mild AR    #Abdominal aortic aneurysm (4.7cm)  - CT angio abdomen/pelvis 9/10/2019: stable ascending aortic aneurysm to 4.7cm    #Atrial fibrillation  - [id number]:  - HAS-BLED:  - Cardiology consult pending  - 2D echocardiogram  - Follow TSH, lipid profile, HbA1c   - Continue with carvedilol 3.125    #Misc  - DVT Prophylaxis: Lovenox 40mg SQ QD   - GI Prophylaxis: not indicated  - Diet: DASH/TLC  - Activity: increase as tolerated  - IV Fluids: not indicated  - Code Status: Full Code    Dispo: admit to telemetry, pending cardiology consult, will likely need ischemic workup Patient is a 44yo male with PMH of heart failure with reduced ejection fraction (EF 30%) s/p AICD, abdominal aortic aneurysm (4.7cm), and atrial fibrillation not on anticoagulation who presented to the ED complaining of dyspnea on exertion. He was recently admitted to [facility] in 9/2019 and 11/2019 for similar symptoms.    #Atypical chest pain, likely secondary to medication non-compliance vs unlikely ACS  - Pain is described as sharp, left-sided chest pain radiating down left arm  - Patient admits to not taking any medication for the past month due to insurance issues  - D-dimer: 69 (doubt pulmonary embolism)  - Pro-BNP: 131 (doubt acute on chronic HFrEF exacerbation)  - Troponin negative x1 (less likely ACS)  - Trend troponin at 8pm and in AM  - Interrogate AICD  - Follow 2D echocardiogram  - Pending cardiology consult  - Follow TSH, lipid profile, A1c  - Restart all home medications (furosemide 40mg PO BID, metoprolol tartrate 50mg PO BID and Entresto 49-51mg PO BID)  - Admit to telemetry    #Hyperglycemia  - Noted on CMP on admission  - Follow hemoglobin A1c    #Meralgia paresthetica  - Patient mentions loss of sensation over right anterior thigh and outlines an area that correlates with superficial femoral cutaneous nerve  - Counseled patient on weight loss and avoiding tight pants    #Heart failure with reduced ejection fraction s/p AICD, stable  - Echo 9/11/2019: EF 30-35%, grade II diastolic dysfunction, mild MR, mild AR  - Cardiac MRI 9/12/2019: mildly dilated LV with global LV hypokinesis, normal RV size and function, no evidence of late gadolinium enhancement  - Repeat echocardiogram  - Interrogate AICD    #Atrial fibrillation  - Patient is in normal sinus rhythm on monitoring in ED  - [id number]: 2  - HAS-BLED: 1  - Cardiology consult pending  - 2D echocardiogram  - Follow TSH, lipid profile, HbA1c   - Continue with metoprolol tartrate 50mg PO BID and Eliquis 5mg PO BID    #Hypertension  - Continue with metoprolol tartrate 50mg PO BID and Entresto 49-51mg PO BID    #Abdominal aortic aneurysm (4.7cm)  - CT angio abdomen/pelvis 9/10/2019: stable ascending aortic aneurysm to 4.7cm  - Follow outpatient for repeat imaging    #Morbid obesity  - Counseled patient on weight loss and lifestyle modifications  - Possible candidate for bariatric surgery     #Misc  - DVT Prophylaxis: Eliquis 5mg PO BID  - GI Prophylaxis: not indicated  - Diet: DASH/TLC  - Activity: increase as tolerated  - IV Fluids: not indicated  - Code Status: Full Code    Dispo: admit to telemetry, pending cardiology consult, will likely need ischemic workup

## 2020-08-11 DIAGNOSIS — I50.22 CHRONIC SYSTOLIC (CONGESTIVE) HEART FAILURE: ICD-10-CM

## 2020-08-11 DIAGNOSIS — R07.9 CHEST PAIN, UNSPECIFIED: ICD-10-CM

## 2020-08-11 DIAGNOSIS — I10 ESSENTIAL (PRIMARY) HYPERTENSION: ICD-10-CM

## 2020-08-11 DIAGNOSIS — I48.91 UNSPECIFIED ATRIAL FIBRILLATION: ICD-10-CM

## 2020-08-11 LAB
A1C WITH ESTIMATED AVERAGE GLUCOSE RESULT: 7.4 % — HIGH (ref 4–5.6)
ANION GAP SERPL CALC-SCNC: 10 MMOL/L — SIGNIFICANT CHANGE UP (ref 7–14)
BUN SERPL-MCNC: 14 MG/DL — SIGNIFICANT CHANGE UP (ref 10–20)
CALCIUM SERPL-MCNC: 9 MG/DL — SIGNIFICANT CHANGE UP (ref 8.5–10.1)
CHLORIDE SERPL-SCNC: 101 MMOL/L — SIGNIFICANT CHANGE UP (ref 98–110)
CHOLEST SERPL-MCNC: 176 MG/DL — SIGNIFICANT CHANGE UP (ref 100–200)
CO2 SERPL-SCNC: 27 MMOL/L — SIGNIFICANT CHANGE UP (ref 17–32)
CREAT SERPL-MCNC: 1 MG/DL — SIGNIFICANT CHANGE UP (ref 0.7–1.5)
ESTIMATED AVERAGE GLUCOSE: 166 MG/DL — HIGH (ref 68–114)
GLUCOSE BLDC GLUCOMTR-MCNC: 140 MG/DL — HIGH (ref 70–99)
GLUCOSE SERPL-MCNC: 248 MG/DL — HIGH (ref 70–99)
HCT VFR BLD CALC: 49.3 % — SIGNIFICANT CHANGE UP (ref 42–52)
HDLC SERPL-MCNC: 37 MG/DL — LOW
HGB BLD-MCNC: 15.7 G/DL — SIGNIFICANT CHANGE UP (ref 14–18)
LIPID PNL WITH DIRECT LDL SERPL: 108 MG/DL — SIGNIFICANT CHANGE UP (ref 4–129)
MAGNESIUM SERPL-MCNC: 1.9 MG/DL — SIGNIFICANT CHANGE UP (ref 1.8–2.4)
MCHC RBC-ENTMCNC: 28.4 PG — SIGNIFICANT CHANGE UP (ref 27–31)
MCHC RBC-ENTMCNC: 31.8 G/DL — LOW (ref 32–37)
MCV RBC AUTO: 89.2 FL — SIGNIFICANT CHANGE UP (ref 80–94)
NRBC # BLD: 0 /100 WBCS — SIGNIFICANT CHANGE UP (ref 0–0)
PLATELET # BLD AUTO: 202 K/UL — SIGNIFICANT CHANGE UP (ref 130–400)
POTASSIUM SERPL-MCNC: 4.2 MMOL/L — SIGNIFICANT CHANGE UP (ref 3.5–5)
POTASSIUM SERPL-SCNC: 4.2 MMOL/L — SIGNIFICANT CHANGE UP (ref 3.5–5)
RBC # BLD: 5.53 M/UL — SIGNIFICANT CHANGE UP (ref 4.7–6.1)
RBC # FLD: 13.1 % — SIGNIFICANT CHANGE UP (ref 11.5–14.5)
SARS-COV-2 RNA SPEC QL NAA+PROBE: SIGNIFICANT CHANGE UP
SODIUM SERPL-SCNC: 138 MMOL/L — SIGNIFICANT CHANGE UP (ref 135–146)
TOTAL CHOLESTEROL/HDL RATIO MEASUREMENT: 4.8 RATIO — SIGNIFICANT CHANGE UP (ref 4–5.5)
TRIGL SERPL-MCNC: 232 MG/DL — HIGH (ref 10–149)
TROPONIN T SERPL-MCNC: <0.01 NG/ML — SIGNIFICANT CHANGE UP
TSH SERPL-MCNC: 1.23 UIU/ML — SIGNIFICANT CHANGE UP (ref 0.27–4.2)
WBC # BLD: 7.98 K/UL — SIGNIFICANT CHANGE UP (ref 4.8–10.8)
WBC # FLD AUTO: 7.98 K/UL — SIGNIFICANT CHANGE UP (ref 4.8–10.8)

## 2020-08-11 PROCEDURE — 93306 TTE W/DOPPLER COMPLETE: CPT | Mod: 26

## 2020-08-11 PROCEDURE — 99233 SBSQ HOSP IP/OBS HIGH 50: CPT

## 2020-08-11 PROCEDURE — 99497 ADVNCD CARE PLAN 30 MIN: CPT

## 2020-08-11 RX ORDER — DEXTROSE 50 % IN WATER 50 %
15 SYRINGE (ML) INTRAVENOUS ONCE
Refills: 0 | Status: DISCONTINUED | OUTPATIENT
Start: 2020-08-11 | End: 2020-08-12

## 2020-08-11 RX ORDER — INSULIN LISPRO 100/ML
5 VIAL (ML) SUBCUTANEOUS
Refills: 0 | Status: DISCONTINUED | OUTPATIENT
Start: 2020-08-11 | End: 2020-08-12

## 2020-08-11 RX ORDER — SODIUM CHLORIDE 9 MG/ML
1000 INJECTION, SOLUTION INTRAVENOUS
Refills: 0 | Status: DISCONTINUED | OUTPATIENT
Start: 2020-08-11 | End: 2020-08-12

## 2020-08-11 RX ORDER — DEXTROSE 50 % IN WATER 50 %
25 SYRINGE (ML) INTRAVENOUS ONCE
Refills: 0 | Status: DISCONTINUED | OUTPATIENT
Start: 2020-08-11 | End: 2020-08-12

## 2020-08-11 RX ORDER — ATORVASTATIN CALCIUM 80 MG/1
40 TABLET, FILM COATED ORAL AT BEDTIME
Refills: 0 | Status: DISCONTINUED | OUTPATIENT
Start: 2020-08-11 | End: 2020-08-12

## 2020-08-11 RX ORDER — DEXTROSE 50 % IN WATER 50 %
12.5 SYRINGE (ML) INTRAVENOUS ONCE
Refills: 0 | Status: DISCONTINUED | OUTPATIENT
Start: 2020-08-11 | End: 2020-08-12

## 2020-08-11 RX ORDER — INSULIN GLARGINE 100 [IU]/ML
15 INJECTION, SOLUTION SUBCUTANEOUS AT BEDTIME
Refills: 0 | Status: DISCONTINUED | OUTPATIENT
Start: 2020-08-11 | End: 2020-08-12

## 2020-08-11 RX ORDER — GLUCAGON INJECTION, SOLUTION 0.5 MG/.1ML
1 INJECTION, SOLUTION SUBCUTANEOUS ONCE
Refills: 0 | Status: DISCONTINUED | OUTPATIENT
Start: 2020-08-11 | End: 2020-08-12

## 2020-08-11 RX ADMIN — Medication 50 MILLIGRAM(S): at 17:49

## 2020-08-11 RX ADMIN — CHLORHEXIDINE GLUCONATE 1 APPLICATION(S): 213 SOLUTION TOPICAL at 06:14

## 2020-08-11 RX ADMIN — ATORVASTATIN CALCIUM 40 MILLIGRAM(S): 80 TABLET, FILM COATED ORAL at 21:25

## 2020-08-11 RX ADMIN — APIXABAN 5 MILLIGRAM(S): 2.5 TABLET, FILM COATED ORAL at 17:49

## 2020-08-11 RX ADMIN — SACUBITRIL AND VALSARTAN 1 TABLET(S): 24; 26 TABLET, FILM COATED ORAL at 06:15

## 2020-08-11 RX ADMIN — Medication 50 MILLIGRAM(S): at 06:14

## 2020-08-11 RX ADMIN — APIXABAN 5 MILLIGRAM(S): 2.5 TABLET, FILM COATED ORAL at 06:15

## 2020-08-11 RX ADMIN — SACUBITRIL AND VALSARTAN 1 TABLET(S): 24; 26 TABLET, FILM COATED ORAL at 17:49

## 2020-08-11 RX ADMIN — Medication 40 MILLIGRAM(S): at 06:15

## 2020-08-11 RX ADMIN — Medication 40 MILLIGRAM(S): at 17:49

## 2020-08-11 NOTE — CONSULT NOTE ADULT - PROBLEM SELECTOR RECOMMENDATION 9
atypical cp, neg troponins x3 , cardiac cath with no significant cad  no further w/u for non cardiac cp from a cardiac standpoint

## 2020-08-11 NOTE — PROGRESS NOTE ADULT - ASSESSMENT
Patient is a 46 y/o male with PMH of HFrEF (30%) s/p ACD, abdominal aortic aneurysm and a-fib not controlled with anticoagulation presenting with dyspnea on exertion and chest pain radiating down the left arm.     Atypical Chest Pain:  -Patient has been noncompliant with medication for past month due to insurance issues  -The pain was described as a shooting pain and radiated down the left arm  -D-dimer is 69: less likely it is a PE  -troponin was negative 3x : less likely it is ACS  -proBNP is 131: less likely it is CHF exacerbation   Plan  - follow Echo  - Cardiology consult   - Restart home medication: furosemide 40mg PO BID, metoprolol tartrate 50mg PO BID, Entresto 49-51 mg PO BID  - follow TSH and lipid levels    Hyperglycemia:  - Blood sugar levels were noted as 193 on admission  Plan  - Follow Hbg A1c     Meralgia Paresthetica:  - Patient had loss of sensation over right anterior thigh overlying are correlating with superficial femoral cutaneous nerve   Plan  -  patient on use of tight pants and importance of weight loss     HFrEF s/p AICD:  - Echo 9/11/2019: EF: 30-35%, grade II diastolic dysfunction, mild MR, mild AR  - Cardiac MI 9/12/2019: mildly dilated LV and global LV hypokinesis, normal RV size and function, no evidence of late gadolinium enhancement  Plan  - repeat Echocardiogram    A-Fib  -Patient is currently on normal sinus rhythm  -[id number]:2  -HAS-BLED: 1  - Cardio consult pending  -2D Echocardiogram  -Follow TSH, lipid profile and HgbA1c  -Continue with metoprolol tartrate 50mg PO BID and Eliquis 5mg PO BID     Hypertension:  -continue with metoprolol tartrate 50 mg PO BID and Entresto 49-51 mg PO BID     Abdomin Patient is a 44 y/o male with PMH of HFrEF (30%) s/p ACD, ascending aortic aneurysm and a-fib not controlled with anticoagulation presenting with dyspnea on exertion and chest pain radiating down the left arm.     Atypical Chest Pain:  -Patient has been noncompliant with medication for past month due to insurance issues  -The pain was described as a shooting pain and radiated down the left arm  -D-dimer is 69: less likely it is a PE  -troponin was negative 3x : less likely it is ACS  -proBNP is 131: less likely it is CHF exacerbation   Plan  - follow Echo  - Cardiology consult   - Restart home medication: furosemide 40mg PO BID, metoprolol tartrate 50mg PO BID, Entresto 49-51 mg PO BID  - follow TSH and lipid levels    Hyperglycemia:  - Blood sugar levels were noted as 193 on admission  Plan  - Follow Hbg A1c     Meralgia Paresthetica:  - Patient had loss of sensation over right anterior thigh overlying are correlating with superficial femoral cutaneous nerve   Plan  -  patient on use of tight pants and importance of weight loss     HFrEF s/p AICD:  - Echo 9/11/2019: EF: 30-35%, grade II diastolic dysfunction, mild MR, mild AR  - Cardiac MI 9/12/2019: mildly dilated LV and global LV hypokinesis, normal RV size and function, no evidence of late gadolinium enhancement  Plan  - repeat Echocardiogram    A-Fib  -Patient is currently on normal sinus rhythm  -[id number]:2  -HAS-BLED: 1  Plan:  - Cardio consult pending  -2D Echocardiogram  -Follow TSH, lipid profile and HgbA1c  -Continue with metoprolol tartrate 50mg PO BID and Eliquis 5mg PO BID     Hypertension:  -continue with metoprolol tartrate 50 mg PO BID and Entresto 49-51 mg PO BID     Ascending Aortic Aneurysm (4.7cm):  - CT angio abdomen/pelvis 9/10/2019: stable ascending aortic aneurysm to 4.7 cm  Plan:  -Follow outpatient for repeat imaging     Morbid Obesity:  Plan:  - patient on weight loss and lifestyle modifications including the DASH diet and fluid intake    Misc:  -DVT prophylaxis: Eliquis 5mg PO BID   -Diet: DASH/TLC  -Activity: increase as needed   -IV Fluids not indicated

## 2020-08-11 NOTE — PROGRESS NOTE ADULT - SUBJECTIVE AND OBJECTIVE BOX
HPI:  [45y man]    CC: dyspnea on exertion    PMH: heart failure with reduced ejection fraction (EF 30%) s/p AICD, abdominal aortic aneurysm (4.7cm), atrial fibrillation not on anticoagulation    PSH: AICD    History of present illness goes back to 6 months ago when the patient was in [place] visiting his sister. He was seated at rest when he felt like "his heart wasn't beating right" and was unable to catch his breath. He went to a hospital in [place], where his medications aspirin and carvedilol were switched to Eliquis and metoprolol, respectively. He was in a good state of health after that hospital visit until about 3-4 days ago when the patient began complaining of shortness of breath and dyspnea on exertion. He becomes short of breath after walking less than a block. He also admits to orthopnea and requires two pillows to sleep at night. The patient felt a sharp, left sided chest pain that radiates to the left arm this morning at around 2am. He said was about 5-6/10. Out of fear of a heart attack, the patient decided to come to the ED.    The patient admits to not taking any of his medications over the past month due to insurance issues. Of note, the patient was admitted to [facility] on 9/2019 and 11/2019 for similar complaints but no acute intervention was performed during either visit.    In the ED, vital signs were Tmax 98.3F, HR 82, /87, RR 19, SpO2 99% on room air. Patient was given aspirin 325mg PO. Troponin negative x1, pro-, and D-dimer 69. (10 Aug 2020 16:09)    Currently admitted to medicine with the primary diagnosis of Chest pain     Today is hospital day 1d.     INTERVAL HPI / OVERNIGHT EVENTS:  Patient was examined and seen at bedside. This morning he is resting comfortably in bed and reports no new issues or overnight events. He states that his chest pain is relieved.     ROS: Otherwise unremarkable     PAST MEDICAL & SURGICAL HISTORY  History of abdominal aortic aneurysm  Atrial fibrillation  Chronic HFrEF (heart failure with reduced ejection fraction (30%))  HTN (hypertension)  S/P implantation of automatic cardioverter/defibrillator (AICD)    ALLERGIES  No Known Allergies    MEDICATIONS  STANDING MEDICATIONS  apixaban 5 milliGRAM(s) Oral two times a day  atorvastatin 40 milliGRAM(s) Oral at bedtime  chlorhexidine 4% Liquid 1 Application(s) Topical <User Schedule>  furosemide    Tablet 40 milliGRAM(s) Oral two times a day  metoprolol tartrate 50 milliGRAM(s) Oral two times a day  sacubitril 49 mG/valsartan 51 mG 1 Tablet(s) Oral two times a day    PRN MEDICATIONS    VITALS:  T(F): 98.4  HR: 92  BP: 129/82  RR: 18  SpO2: 96%    PHYSICAL EXAM  GEN: NAD, Resting comfortably in bed  PULM: Clear to auscultation bilaterally, No wheezes  CVS: Regular rate and rhythm, S1-S2, no murmurs  ABD: Soft, non-tender, non-distended, no guarding  EXT: No edema  NEURO: A&Ox3, no focal deficits    LABS                        15.7   7.98  )-----------( 202      ( 11 Aug 2020 06:18 )             49.3     08-11    138  |  101  |  14  ----------------------------<  248<H>  4.2   |  27  |  1.0    Ca    9.0      11 Aug 2020 06:18  Mg     1.9     08-11    TPro  7.3  /  Alb  4.1  /  TBili  0.4  /  DBili  x   /  AST  20  /  ALT  25  /  AlkPhos  85  08-10    PT/INR - ( 10 Aug 2020 12:15 )   PT: 11.80 sec;   INR: 1.03 ratio         PTT - ( 10 Aug 2020 12:15 )  PTT:27.5 sec      Troponin T, Serum: <0.01 ng/mL (08-11-20 @ 06:18)  Troponin T, Serum: <0.01 ng/mL (08-10-20 @ 20:35)  Troponin T, Serum: <0.01 ng/mL (08-10-20 @ 12:15)      CARDIAC MARKERS ( 11 Aug 2020 06:18 )  x     / <0.01 ng/mL / x     / x     / x      CARDIAC MARKERS ( 10 Aug 2020 20:35 )  x     / <0.01 ng/mL / x     / x     / x      CARDIAC MARKERS ( 10 Aug 2020 12:15 )  x     / <0.01 ng/mL / x     / x     / x          RADIOLOGY HPI:  [45y man]    CC: dyspnea on exertion    PMH: heart failure with reduced ejection fraction (EF 30%) s/p AICD, ascending aortic aneurysm (4.7cm), atrial fibrillation not on anticoagulation    PSH: AICD    History of present illness goes back to 6 months ago when the patient was in [place] visiting his sister. He was seated at rest when he felt like "his heart wasn't beating right" and was unable to catch his breath. He went to a hospital in [place], where his medications aspirin and carvedilol were switched to Eliquis and metoprolol, respectively. He was in a good state of health after that hospital visit until about 3-4 days ago when the patient began complaining of shortness of breath and dyspnea on exertion. He becomes short of breath after walking less than a block. He also admits to orthopnea and requires two pillows to sleep at night. The patient felt a sharp, left sided chest pain that radiates to the left arm this morning at around 2am. He said was about 5-6/10. Out of fear of a heart attack, the patient decided to come to the ED.    The patient admits to not taking any of his medications over the past month due to insurance issues. Of note, the patient was admitted to [facility] on 9/2019 and 11/2019 for similar complaints but no acute intervention was performed during either visit.    In the ED, vital signs were Tmax 98.3F, HR 82, /87, RR 19, SpO2 99% on room air. Patient was given aspirin 325mg PO. Troponin negative x1, pro-, and D-dimer 69. (10 Aug 2020 16:09)    Currently admitted to medicine with the primary diagnosis of Chest pain     Today is hospital day 1d.     INTERVAL HPI / OVERNIGHT EVENTS:  Patient was examined and seen at bedside. This morning he is resting comfortably in bed and reports no new issues or overnight events. He states that his chest pain is relieved.     ROS: Otherwise unremarkable     PAST MEDICAL & SURGICAL HISTORY  History of ascending aortic aneurysm  Atrial fibrillation  Chronic HFrEF (heart failure with reduced ejection fraction (30%))  HTN (hypertension)  S/P implantation of automatic cardioverter/defibrillator (AICD)    ALLERGIES  No Known Allergies    MEDICATIONS  STANDING MEDICATIONS  apixaban 5 milliGRAM(s) Oral two times a day  atorvastatin 40 milliGRAM(s) Oral at bedtime  chlorhexidine 4% Liquid 1 Application(s) Topical <User Schedule>  furosemide    Tablet 40 milliGRAM(s) Oral two times a day  metoprolol tartrate 50 milliGRAM(s) Oral two times a day  sacubitril 49 mG/valsartan 51 mG 1 Tablet(s) Oral two times a day    PRN MEDICATIONS    VITALS:  T(F): 98.4  HR: 92  BP: 129/82  RR: 18  SpO2: 96%    PHYSICAL EXAM  GEN: NAD, Resting comfortably in bed  PULM: Clear to auscultation bilaterally, No wheezes  CVS: Regular rate and rhythm, S1-S2, no murmurs  ABD: Soft, non-tender, non-distended, no guarding  EXT: No edema  NEURO: A&Ox3, no focal deficits    LABS                        15.7   7.98  )-----------( 202      ( 11 Aug 2020 06:18 )             49.3     08-11    138  |  101  |  14  ----------------------------<  248<H>  4.2   |  27  |  1.0    Ca    9.0      11 Aug 2020 06:18  Mg     1.9     08-11    TPro  7.3  /  Alb  4.1  /  TBili  0.4  /  DBili  x   /  AST  20  /  ALT  25  /  AlkPhos  85  08-10    PT/INR - ( 10 Aug 2020 12:15 )   PT: 11.80 sec;   INR: 1.03 ratio         PTT - ( 10 Aug 2020 12:15 )  PTT:27.5 sec      Troponin T, Serum: <0.01 ng/mL (08-11-20 @ 06:18)  Troponin T, Serum: <0.01 ng/mL (08-10-20 @ 20:35)  Troponin T, Serum: <0.01 ng/mL (08-10-20 @ 12:15)      CARDIAC MARKERS ( 11 Aug 2020 06:18 )  x     / <0.01 ng/mL / x     / x     / x      CARDIAC MARKERS ( 10 Aug 2020 20:35 )  x     / <0.01 ng/mL / x     / x     / x      CARDIAC MARKERS ( 10 Aug 2020 12:15 )  x     / <0.01 ng/mL / x     / x     / x          RADIOLOGY

## 2020-08-11 NOTE — CONSULT NOTE ADULT - SUBJECTIVE AND OBJECTIVE BOX
Patient is a 45y old  Male who presents with a chief complaint of shooting rt sided cp and sob.        HPI:  [45y man]    CC: dyspnea on exertion    PMH: heart failure with reduced ejection fraction (EF 30%) s/p AICD, thoracic  aortic aneurysm (4.7cm), paroxysmal atrial fibrillation.  PSH: AICD    History of present illness goes back to 6 months ago when the patient was in [place] visiting his sister. He was seated at rest when he felt like "his heart wasn't beating right" and was unable to catch his breath. He went to a hospital in [place], where his medications aspirin and carvedilol were switched to Eliquis and metoprolol, respectively. He was in a good state of health after that hospital visit until about 3-4 days ago when the patient began complaining of shortness of breath and dyspnea on exertion. He becomes short of breath after walking less than a block. He also admits to orthopnea and requires two pillows to sleep at night. The patient felt a sharp, right  sided electrical  chest pain that radiates to the rt arm this morning at around 2am. He said was about 5-6/10. Out of fear of a heart attack, the patient decided to come to the ED.    The patient admits to not taking any of his medications over the past month due to insurance issues. Of note, the patient was admitted to [facility] on 9/2019 and 11/2019 for similar complaints but no acute intervention was performed during either visit.    In the ED, vital signs were Tmax 98.3F, HR 82, /87, RR 19, SpO2 99% on room air. Patient was given aspirin 325mg PO. Troponin negative x1, pro-, and D-dimer 69. (10 Aug 2020 16:09)      PAST MEDICAL & SURGICAL HISTORY:  History of thoracic aortic aneurysm  Atrial fibrillation  Chronic HFrEF (heart failure with reduced ejection fraction)  HTN (hypertension)  S/P implantation of automatic cardioverter/defibrillator (AICD)      PREVIOUS DIAGNOSTIC TESTING:      ECHO  FINDINGS:< from: Transthoracic Echocardiogram (08.11.20 @ 11:06) >  Summary:   1. Left ventricular ejection fraction, by visual estimation, is 40 to 45%.   2. Mildly decreased global left ventricular systolic function.   3. Mildly increased LV wall thickness.   4. Mild aortic regurgitation.   5. Dilatation of the ascending aorta.   6. Asc aorta 4.8 cm.   7. No evidence of aortic stenosis.        CATHETERIZATION  FINDINGS: 2 years ago with no significant cad.      MEDICATIONS  (STANDING):  apixaban 5 milliGRAM(s) Oral two times a day  atorvastatin 40 milliGRAM(s) Oral at bedtime  chlorhexidine 4% Liquid 1 Application(s) Topical <User Schedule>  dextrose 5%. 1000 milliLiter(s) (50 mL/Hr) IV Continuous <Continuous>  dextrose 50% Injectable 12.5 Gram(s) IV Push once  dextrose 50% Injectable 25 Gram(s) IV Push once  dextrose 50% Injectable 25 Gram(s) IV Push once  furosemide    Tablet 40 milliGRAM(s) Oral two times a day  insulin glargine Injectable (LANTUS) 15 Unit(s) SubCutaneous at bedtime  insulin lispro Injectable (HumaLOG) 5 Unit(s) SubCutaneous three times a day before meals  metoprolol tartrate 50 milliGRAM(s) Oral two times a day  sacubitril 49 mG/valsartan 51 mG 1 Tablet(s) Oral two times a day    MEDICATIONS  (PRN):  dextrose 40% Gel 15 Gram(s) Oral once PRN Blood Glucose LESS THAN 70 milliGRAM(s)/deciliter  glucagon  Injectable 1 milliGRAM(s) IntraMuscular once PRN Glucose LESS THAN 70 milligrams/deciliter      FAMILY HISTORY:  Family history of heart disease (Father)  Family history of diabetes mellitus (Mother)      SOCIAL HISTORY:  CIGARETTES: never  ALCOHOL: none  DRUGS:none                      REVIEW OF SYSTEMS:  CONSTITUTIONAL: No distress, Looks stable  NECK: No pain or stiffness  RESPIRATORY: No cough, wheezing, (+)shortness of breath  CARDIOVASCULAR: (+) chest pain,(+) SOB, no palpitations, leg swelling  GASTROINTESTINAL: No abdominal or epigastric pain. No nausea, vomiting, or hematemesis;  No melena.  NEUROLOGICAL: No dizziness, headaches, memory loss, loss of strength  SKIN: No itching, burning, rashes, or lesions   ENDOCRINE: No heat or cold intolerance  MUSCULOSKELETAL: No joint pain, No  swelling; No muscle pain  PSYCHIATRIC: No depression, anxiety, mood swings, or difficulty sleeping  ALLERGY: No hives, itching, rash          Vital Signs Last 24 Hrs  T(C): 36.6 (11 Aug 2020 13:53), Max: 36.9 (10 Aug 2020 23:00)  T(F): 97.8 (11 Aug 2020 13:53), Max: 98.4 (10 Aug 2020 23:00)  HR: 75 (11 Aug 2020 13:53) (75 - 92)  BP: 131/84 (11 Aug 2020 13:53) (129/82 - 131/84)  BP(mean): --  RR: 18 (11 Aug 2020 13:53) (18 - 18)  SpO2: 96% (10 Aug 2020 23:00) (96% - 96%)                      PHYSICAL EXAM:  GENERAL: No distress, well developed  HEAD:  Atraumatic, Normocephalic  NECK: Supple, No JVD, No Bruit of either carotid arteries  NERVOUS SYSTEM:  Alert, Awake, Oriented to time, place, person; Normal memory and speech; Normal motor Strength 5/5 B/L upper and lower extremities  CHEST/LUNG: Normal air entry to lung base bilaterally; No wheeze, crackle, rales, rhonchi  HEART: Regular heart beat, S1, A2, P2, No S3, No S4, No gallop, No murmur  ABDOMEN: Soft, Non tender, Non distended; Bowel sounds present  EXTREMITIES:  2+ Peripheral Pulses, No clubbing, No edema  SKIN: No rashes or lesions    TELEMETRY: nsr    ECG:< from: 12 Lead ECG (08.10.20 @ 12:30) >  Diagnosis Line Normal sinus rhythm  Normal ECG      < end of copied text >  CXRAY   < from: Xray Chest 1 View AP/PA (08.10.20 @ 14:18) >  Impression:    No radiographic evidence of acute cardiopulmonary disease.      < end of copied text >    I&O's Detail    11 Aug 2020 07:01  -  11 Aug 2020 19:10  --------------------------------------------------------  IN:    Oral Fluid: 840 mL  Total IN: 840 mL    OUT:  Total OUT: 0 mL    Total NET: 840 mL          LABS:                        15.7   7.98  )-----------( 202      ( 11 Aug 2020 06:18 )             49.3     08-11    138  |  101  |  14  ----------------------------<  248<H>  4.2   |  27  |  1.0    Ca    9.0      11 Aug 2020 06:18  Mg     1.9     08-11    TPro  7.3  /  Alb  4.1  /  TBili  0.4  /  DBili  x   /  AST  20  /  ALT  25  /  AlkPhos  85  08-10    CARDIAC MARKERS ( 11 Aug 2020 06:18 )  x     / <0.01 ng/mL / x     / x     / x      CARDIAC MARKERS ( 10 Aug 2020 20:35 )  x     / <0.01 ng/mL / x     / x     / x      CARDIAC MARKERS ( 10 Aug 2020 12:15 )  x     / <0.01 ng/mL / x     / x     / x          PT/INR - ( 10 Aug 2020 12:15 )   PT: 11.80 sec;   INR: 1.03 ratio         PTT - ( 10 Aug 2020 12:15 )  PTT:27.5 sec    I&O's Summary    11 Aug 2020 07:01  -  11 Aug 2020 19:10  --------------------------------------------------------  IN: 840 mL / OUT: 0 mL / NET: 840 mL        RADIOLOGY & ADDITIONAL STUDIES:

## 2020-08-11 NOTE — PROGRESS NOTE ADULT - SUBJECTIVE AND OBJECTIVE BOX
HPI  Patient is a 45y old Male who presents with a chief complaint of chest pain    Currently admitted to medicine with the primary diagnosis of Chest pain r/o ACS     Today is hospital day 1d.     INTERVAL HPI / OVERNIGHT EVENTS:  Patient was examined and seen at bedside. This morning he is resting comfortably in bed and reports no new issues or overnight events. Denies any chest pain, SOB    ROS: Otherwise unremarkable     PAST MEDICAL & SURGICAL HISTORY  History of abdominal aortic aneurysm  Atrial fibrillation  Chronic HFrEF (heart failure with reduced ejection fraction)  HTN (hypertension)  S/P implantation of automatic cardioverter/defibrillator (AICD)    ALLERGIES  No Known Allergies    MEDICATIONS  STANDING MEDICATIONS  apixaban 5 milliGRAM(s) Oral two times a day  atorvastatin 40 milliGRAM(s) Oral at bedtime  chlorhexidine 4% Liquid 1 Application(s) Topical <User Schedule>  furosemide    Tablet 40 milliGRAM(s) Oral two times a day  metoprolol tartrate 50 milliGRAM(s) Oral two times a day  sacubitril 49 mG/valsartan 51 mG 1 Tablet(s) Oral two times a day    PRN MEDICATIONS    VITALS:  T(F): 98.4  HR: 92  BP: 129/82  RR: 18  SpO2: 96%    PHYSICAL EXAM  GEN: NAD, Resting comfortably in bed  PULM: Clear to auscultation bilaterally, No wheezes  CVS: Regular rate and rhythm, S1-S2, no murmurs  ABD: Soft, non-tender, non-distended, no guarding  EXT: No edema  NEURO: A&Ox3, no focal deficits    LABS                        15.7   7.98  )-----------( 202      ( 11 Aug 2020 06:18 )             49.3     08-11    138  |  101  |  14  ----------------------------<  248<H>  4.2   |  27  |  1.0    Ca    9.0      11 Aug 2020 06:18  Mg     1.9     08-11    TPro  7.3  /  Alb  4.1  /  TBili  0.4  /  DBili  x   /  AST  20  /  ALT  25  /  AlkPhos  85  08-10    PT/INR - ( 10 Aug 2020 12:15 )   PT: 11.80 sec;   INR: 1.03 ratio         PTT - ( 10 Aug 2020 12:15 )  PTT:27.5 sec      Troponin T, Serum: <0.01 ng/mL (08-11-20 @ 06:18)  Troponin T, Serum: <0.01 ng/mL (08-10-20 @ 20:35)      CARDIAC MARKERS ( 11 Aug 2020 06:18 )  x     / <0.01 ng/mL / x     / x     / x      CARDIAC MARKERS ( 10 Aug 2020 20:35 )  x     / <0.01 ng/mL / x     / x     / x      CARDIAC MARKERS ( 10 Aug 2020 12:15 )  x     / <0.01 ng/mL / x     / x     / x          RADIOLOGY

## 2020-08-11 NOTE — PROGRESS NOTE ADULT - ASSESSMENT
Patient is a 44yo male with PMH of heart failure with reduced ejection fraction (EF 30%) s/p AICD, abdominal aortic aneurysm (4.7cm), and atrial fibrillation not on anticoagulation who presented to the ED complaining of dyspnea on exertion. He was recently admitted to [facility] in 9/2019 and 11/2019 for similar symptoms.    #Atypical chest pain, likely secondary to medication non-compliance vs unlikely ACS  - Pain is described as sharp, left-sided chest pain radiating down left arm  - Patient admits to not taking any medication for the past month due to insurance issues  - D-dimer: 69 (doubt pulmonary embolism)  - Pro-BNP: 131 (doubt acute on chronic HFrEF exacerbation)  - Troponin negative x2 (less likely ACS)  - Trend troponin  - Interrogate AICD --> EP unable to interrogate AICD as this a defibrillator that only shocks, no recording  - Follow 2D echocardiogram  - Follow TSH, lipid profile, A1c  - Restarted all home medications (furosemide 40mg PO BID, metoprolol tartrate 50mg PO BID and Entresto 49-51mg PO BID)  - d/c planning for tomorrow     #Hyperglycemia  - Noted on CMP on admission  - Follow up hemoglobin A1c    #Meralgia paresthetica  - Patient mentions loss of sensation over right anterior thigh and outlines an area that correlates with superficial femoral cutaneous nerve  - Counseled patient on weight loss and avoiding tight pants    #Heart failure with reduced ejection fraction s/p AICD, stable  - Echo 9/11/2019: EF 30-35%, grade II diastolic dysfunction, mild MR, mild AR  - Cardiac MRI 9/12/2019: mildly dilated LV with global LV hypokinesis, normal RV size and function, no evidence of late gadolinium enhancement  - Repeat echocardiogram      #Atrial fibrillation  - Patient is in normal sinus rhythm on monitoring in ED  - [id number]: 2  - HAS-BLED: 1  - 2D echocardiogram pending  - Follow TSH, lipid profile, HbA1c   - Continue with metoprolol tartrate 50mg PO BID and Eliquis 5mg PO BID    #Hypertension  - Continue with metoprolol tartrate 50mg PO BID and Entresto 49-51mg PO BID    #Abdominal aortic aneurysm (4.7cm)  - CT angio abdomen/pelvis 9/10/2019: stable ascending aortic aneurysm to 4.7cm  - Follow outpatient for repeat imaging    #Morbid obesity  - Counseled patient on weight loss and lifestyle modifications  - Possible candidate for bariatric surgery Patient is a 46yo male with PMH of heart failure with reduced ejection fraction (EF 30%) s/p AICD, abdominal aortic aneurysm (4.7cm), and atrial fibrillation not on anticoagulation who presented to the ED complaining of dyspnea on exertion. He was recently admitted to [facility] in 9/2019 and 11/2019 for similar symptoms.    #Atypical chest pain, likely secondary to medication non-compliance vs unlikely ACS  - Pain is described as sharp, left-sided chest pain radiating down left arm  - Patient admits to not taking any medication for the past month due to insurance issues  - D-dimer: 69 (doubt pulmonary embolism)  - Pro-BNP: 131 (doubt acute on chronic HFrEF exacerbation)  - Troponin negative x2 (less likely ACS)  - Trend troponin  - Interrogate AICD --> EP unable to interrogate AICD as this a defibrillator that only shocks, no recording  - Echo 08/11/20: EF 45-50%, mildly decreased global left ventricular systolic function, mildly increased LV wall thickness, mild AR, Ascending aortic aneurysm 4.8cm   - Restarted all home medications (furosemide 40mg PO BID, metoprolol tartrate 50mg PO BID and Entresto 49-51mg PO BID)  - d/c planning for tomorrow     #Hyperglycemia  - Noted on CMP on admission  - hemoglobin A1c 7.4, Glucose 289  - started on Lantus 15U at bedtime with Lispro 5U qhs    #Meralgia paresthetica  - Patient mentions loss of sensation over right anterior thigh and outlines an area that correlates with superficial femoral cutaneous nerve  - Counseled patient on weight loss and avoiding tight pants    #Heart failure with reduced ejection fraction s/p AICD, stable  - Echo 9/11/2019: EF 30-35%, grade II diastolic dysfunction, mild MR, mild AR  - Cardiac MRI 9/12/2019: mildly dilated LV with global LV hypokinesis, normal RV size and function, no evidence of late gadolinium enhancement  - Echo 08/11/20: EF 45-50%      #Atrial fibrillation  - Patient is in normal sinus rhythm on monitoring in ED  - [id number]: 2  - HAS-BLED: 1  - Continue with metoprolol tartrate 50mg PO BID and Eliquis 5mg PO BID    #Hypertension  - Continue with metoprolol tartrate 50mg PO BID and Entresto 49-51mg PO BID    #Abdominal aortic aneurysm (4.7cm)  - CT angio abdomen/pelvis 9/10/2019: stable ascending aortic aneurysm to 4.7cm  - Follow outpatient for repeat imaging    #Morbid obesity  - Counseled patient on weight loss and lifestyle modifications  - Possible candidate for bariatric surgery

## 2020-08-11 NOTE — PROGRESS NOTE ADULT - ASSESSMENT
A 44yo male with PMH of heart failure with reduced ejection fraction (EF 30%) s/p AICD, abdominal aortic aneurysm (4.7cm), and atrial fibrillation not on anticoagulation who presented to the ED complaining of dyspnea on exertion and orthopnea. Also C/O CP radiating to Lt arm. A 46yo male with PMH of heart failure with reduced ejection fraction (EF 30%) s/p AICD, thoracic aortic aneurysm (4.7cm), and atrial fibrillation not on anticoagulation who presented to the ED complaining of dyspnea on exertion and orthopnea. Also C/O CP radiating to Lt arm.     1.	CP musculoskeletal  2.	SOB due to chronic HFrEF  3.	S/P AICD  4.	A-Fib not on A/C          PLAN:    ·	Care D/W the cardiology. Pt had cath about 2 years ago and had normal coronaries. Pt has NICM. CP is musculoskeletal.   ·	CE x 3 are negative  ·	ECHO reviewed. EF is 30-35%  ·	Cardiology recommended AICD interrogation by EP  ·	Pt is non compliant and stopped taking his meds a few months ago  ·	Restarted on Entresto, and Metoprolol  ·	Cont Lasix 40 mg po q 12h.   ·	Check i's and o's and daily wt.   ·	Low salt diet and water restriction to 1.5 L/D  ·	D/C planning after AICD interrogation.

## 2020-08-11 NOTE — CONSULT NOTE ADULT - PROBLEM SELECTOR RECOMMENDATION 2
pt with improved ef, non compliant with meds.  pt was told in the past that if insurance was an issue to call to the office for samples  cont entresto and metoprolol  icd as per ep  cont lasix po

## 2020-08-11 NOTE — PROGRESS NOTE ADULT - SUBJECTIVE AND OBJECTIVE BOX
BASHIR ALCOCER  45y Male    CHIEF COMPLAINT:    Patient is a 45y old  Male who presents with a chief complaint of rule out ACS (10 Aug 2020 16:09)      INTERVAL HPI/OVERNIGHT EVENTS:    Patient seen and examined.    ROS: All other systems are negative.    Vital Signs:    T(F): 98.4 (08-10-20 @ 23:00), Max: 98.4 (08-10-20 @ 23:00)  HR: 92 (08-10-20 @ 23:00) (68 - 92)  BP: 129/82 (08-10-20 @ 23:00) (129/82 - 144/87)  RR: 18 (08-10-20 @ 23:00) (18 - 19)  SpO2: 96% (08-10-20 @ 23:00) (96% - 99%)  I&O's Summary    Daily     Daily   CAPILLARY BLOOD GLUCOSE          PHYSICAL EXAM:    GENERAL:  NAD  SKIN: No rashes or lesions  HENT: Atrumatic. Normocephalic. PERRL. Moist membranes.  NECK: Supple, No JVD. No lymphadenopathy.  PULMONARY: CTA B/L. No wheezing. No rales  CVS: Normal S1, S2. Rate and Rythm are regular. No murmurs.  ABDOMEN/GI: Soft, Nontender, Nondistended; BS present  EXTREMITIES: Peripheral pulses intact. No edema B/L LE.  NEUROLOGIC:  No motor or sensory deficit.  PSYCH: Alert & oriented x 3    Consultant(s) Notes Reviewed:  [x ] YES  [ ] NO  Care Discussed with Consultants/Other Providers [ x] YES  [ ] NO    EKG reviewed  Telemetry reviewed    LABS:                        15.1   6.79  )-----------( 193      ( 10 Aug 2020 12:15 )             46.6     08-10    140  |  102  |  15  ----------------------------<  193<H>  4.4   |  29  |  1.0    Ca    9.4      10 Aug 2020 12:15  Mg     1.8     08-10    TPro  7.3  /  Alb  4.1  /  TBili  0.4  /  DBili  x   /  AST  20  /  ALT  25  /  AlkPhos  85  08-10    PT/INR - ( 10 Aug 2020 12:15 )   PT: 11.80 sec;   INR: 1.03 ratio         PTT - ( 10 Aug 2020 12:15 )  PTT:27.5 sec  Serum Pro-Brain Natriuretic Peptide: 131 pg/mL (08-10-20 @ 12:15)    Trop <0.01, CKMB --, CK --, 08-10-20 @ 20:35  Trop <0.01, CKMB --, CK --, 08-10-20 @ 12:15        RADIOLOGY & ADDITIONAL TESTS:      Imaging or report Personally Reviewed:  [ ] YES  [ ] NO    Medications:  Standing  apixaban 5 milliGRAM(s) Oral two times a day  chlorhexidine 4% Liquid 1 Application(s) Topical <User Schedule>  furosemide    Tablet 40 milliGRAM(s) Oral two times a day  metoprolol tartrate 50 milliGRAM(s) Oral two times a day  sacubitril 49 mG/valsartan 51 mG 1 Tablet(s) Oral two times a day    PRN Meds      Case discussed with resident    Care discussed with pt/family BASHIR ALCOCER  45y Male    CHIEF COMPLAINT:    Patient is a 45y old  Male who presents with a chief complaint of rule out ACS (10 Aug 2020 16:09)      INTERVAL HPI/OVERNIGHT EVENTS:    Patient seen and examined. C/O CHAMBERLAIN and on & off shooting type of cp. No palpitations.     ROS: All other systems are negative.    Vital Signs:    T(F): 98.4 (08-10-20 @ 23:00), Max: 98.4 (08-10-20 @ 23:00)  HR: 92 (08-10-20 @ 23:00) (68 - 92)  BP: 129/82 (08-10-20 @ 23:00) (129/82 - 144/87)  RR: 18 (08-10-20 @ 23:00) (18 - 19)  SpO2: 96% (08-10-20 @ 23:00) (96% - 99%)  I&O's Summary    Daily     Daily   CAPILLARY BLOOD GLUCOSE          PHYSICAL EXAM:    GENERAL:  NAD  SKIN: No rashes or lesions  HENT: Atraumatic Normocephalic. PERRL. Moist membranes.  NECK: Supple, No JVD. No lymphadenopathy.  PULMONARY: CTA B/L. No wheezing. No rales  CVS: Normal S1, S2. Rate and Rhythm are regular. No murmurs.  ABDOMEN/GI: Soft, Nontender, Nondistended; BS present  EXTREMITIES: Peripheral pulses intact. No edema B/L LE.  NEUROLOGIC:  No motor or sensory deficit.  PSYCH: Alert & oriented x 3    Consultant(s) Notes Reviewed:  [x ] YES  [ ] NO  Care Discussed with Consultants/Other Providers [ x] YES  [ ] NO    EKG reviewed  Telemetry reviewed    LABS:                        15.1   6.79  )-----------( 193      ( 10 Aug 2020 12:15 )             46.6     08-10    140  |  102  |  15  ----------------------------<  193<H>  4.4   |  29  |  1.0    Ca    9.4      10 Aug 2020 12:15  Mg     1.8     08-10    TPro  7.3  /  Alb  4.1  /  TBili  0.4  /  DBili  x   /  AST  20  /  ALT  25  /  AlkPhos  85  08-10    PT/INR - ( 10 Aug 2020 12:15 )   PT: 11.80 sec;   INR: 1.03 ratio         PTT - ( 10 Aug 2020 12:15 )  PTT:27.5 sec  Serum Pro-Brain Natriuretic Peptide: 131 pg/mL (08-10-20 @ 12:15)    Trop <0.01, CKMB --, CK --, 08-10-20 @ 20:35  Trop <0.01, CKMB --, CK --, 08-10-20 @ 12:15        RADIOLOGY & ADDITIONAL TESTS:    < from: Transthoracic Echocardiogram (09.11.19 @ 12:15) >    Summary:   1. Left ventricular ejection fraction, by visual estimation, is 30 to   35%.   2. Spectral Doppler shows pseudonormal pattern of left ventricular   myocardial filling (Grade II diastolic dysfunction).   3. Mild mitral valve regurgitation.   4. Mild aortic regurgitation.   5. Dilatation of the aortic root and ascending aorta.    < end of copied text >    Imaging or report Personally Reviewed:  [ ] YES  [ ] NO    Medications:  Standing  apixaban 5 milliGRAM(s) Oral two times a day  chlorhexidine 4% Liquid 1 Application(s) Topical <User Schedule>  furosemide    Tablet 40 milliGRAM(s) Oral two times a day  metoprolol tartrate 50 milliGRAM(s) Oral two times a day  sacubitril 49 mG/valsartan 51 mG 1 Tablet(s) Oral two times a day    PRN Meds      Case discussed with resident    Care discussed with pt/family

## 2020-08-11 NOTE — PATIENT PROFILE ADULT - FALL HARM RISK TYPE OF ASSESSMENT
admission Lids with acceptable appearance and movement/Pupils equally round and react to light/Pupil red reflexes present and equal/Acceptable eye movement/Conjunctiva clear

## 2020-08-12 ENCOUNTER — TRANSCRIPTION ENCOUNTER (OUTPATIENT)
Age: 45
End: 2020-08-12

## 2020-08-12 VITALS
SYSTOLIC BLOOD PRESSURE: 114 MMHG | DIASTOLIC BLOOD PRESSURE: 70 MMHG | HEART RATE: 72 BPM | TEMPERATURE: 98 F | RESPIRATION RATE: 18 BRPM

## 2020-08-12 LAB
ALBUMIN SERPL ELPH-MCNC: 4.1 G/DL — SIGNIFICANT CHANGE UP (ref 3.5–5.2)
ALP SERPL-CCNC: 86 U/L — SIGNIFICANT CHANGE UP (ref 30–115)
ALT FLD-CCNC: 24 U/L — SIGNIFICANT CHANGE UP (ref 0–41)
ANION GAP SERPL CALC-SCNC: 10 MMOL/L — SIGNIFICANT CHANGE UP (ref 7–14)
AST SERPL-CCNC: 18 U/L — SIGNIFICANT CHANGE UP (ref 0–41)
BASOPHILS # BLD AUTO: 0.07 K/UL — SIGNIFICANT CHANGE UP (ref 0–0.2)
BASOPHILS NFR BLD AUTO: 0.9 % — SIGNIFICANT CHANGE UP (ref 0–1)
BILIRUB SERPL-MCNC: 0.6 MG/DL — SIGNIFICANT CHANGE UP (ref 0.2–1.2)
BUN SERPL-MCNC: 14 MG/DL — SIGNIFICANT CHANGE UP (ref 10–20)
CALCIUM SERPL-MCNC: 9.2 MG/DL — SIGNIFICANT CHANGE UP (ref 8.5–10.1)
CHLORIDE SERPL-SCNC: 100 MMOL/L — SIGNIFICANT CHANGE UP (ref 98–110)
CO2 SERPL-SCNC: 28 MMOL/L — SIGNIFICANT CHANGE UP (ref 17–32)
CREAT SERPL-MCNC: 0.9 MG/DL — SIGNIFICANT CHANGE UP (ref 0.7–1.5)
EOSINOPHIL # BLD AUTO: 0.15 K/UL — SIGNIFICANT CHANGE UP (ref 0–0.7)
EOSINOPHIL NFR BLD AUTO: 1.8 % — SIGNIFICANT CHANGE UP (ref 0–8)
GLUCOSE BLDC GLUCOMTR-MCNC: 117 MG/DL — HIGH (ref 70–99)
GLUCOSE BLDC GLUCOMTR-MCNC: 123 MG/DL — HIGH (ref 70–99)
GLUCOSE BLDC GLUCOMTR-MCNC: 132 MG/DL — HIGH (ref 70–99)
GLUCOSE BLDC GLUCOMTR-MCNC: 166 MG/DL — HIGH (ref 70–99)
GLUCOSE BLDC GLUCOMTR-MCNC: 240 MG/DL — HIGH (ref 70–99)
GLUCOSE SERPL-MCNC: 142 MG/DL — HIGH (ref 70–99)
HCT VFR BLD CALC: 49.2 % — SIGNIFICANT CHANGE UP (ref 42–52)
HGB BLD-MCNC: 15.8 G/DL — SIGNIFICANT CHANGE UP (ref 14–18)
IMM GRANULOCYTES NFR BLD AUTO: 0.2 % — SIGNIFICANT CHANGE UP (ref 0.1–0.3)
LYMPHOCYTES # BLD AUTO: 2.92 K/UL — SIGNIFICANT CHANGE UP (ref 1.2–3.4)
LYMPHOCYTES # BLD AUTO: 35.7 % — SIGNIFICANT CHANGE UP (ref 20.5–51.1)
MAGNESIUM SERPL-MCNC: 2 MG/DL — SIGNIFICANT CHANGE UP (ref 1.8–2.4)
MCHC RBC-ENTMCNC: 28.6 PG — SIGNIFICANT CHANGE UP (ref 27–31)
MCHC RBC-ENTMCNC: 32.1 G/DL — SIGNIFICANT CHANGE UP (ref 32–37)
MCV RBC AUTO: 89 FL — SIGNIFICANT CHANGE UP (ref 80–94)
MONOCYTES # BLD AUTO: 0.95 K/UL — HIGH (ref 0.1–0.6)
MONOCYTES NFR BLD AUTO: 11.6 % — HIGH (ref 1.7–9.3)
NEUTROPHILS # BLD AUTO: 4.06 K/UL — SIGNIFICANT CHANGE UP (ref 1.4–6.5)
NEUTROPHILS NFR BLD AUTO: 49.8 % — SIGNIFICANT CHANGE UP (ref 42.2–75.2)
NRBC # BLD: 0 /100 WBCS — SIGNIFICANT CHANGE UP (ref 0–0)
PLATELET # BLD AUTO: 189 K/UL — SIGNIFICANT CHANGE UP (ref 130–400)
POTASSIUM SERPL-MCNC: 4.2 MMOL/L — SIGNIFICANT CHANGE UP (ref 3.5–5)
POTASSIUM SERPL-SCNC: 4.2 MMOL/L — SIGNIFICANT CHANGE UP (ref 3.5–5)
PROT SERPL-MCNC: 6.9 G/DL — SIGNIFICANT CHANGE UP (ref 6–8)
RBC # BLD: 5.53 M/UL — SIGNIFICANT CHANGE UP (ref 4.7–6.1)
RBC # FLD: 13.1 % — SIGNIFICANT CHANGE UP (ref 11.5–14.5)
SODIUM SERPL-SCNC: 138 MMOL/L — SIGNIFICANT CHANGE UP (ref 135–146)
WBC # BLD: 8.17 K/UL — SIGNIFICANT CHANGE UP (ref 4.8–10.8)
WBC # FLD AUTO: 8.17 K/UL — SIGNIFICANT CHANGE UP (ref 4.8–10.8)

## 2020-08-12 PROCEDURE — 99233 SBSQ HOSP IP/OBS HIGH 50: CPT

## 2020-08-12 RX ORDER — LISINOPRIL 2.5 MG/1
1 TABLET ORAL
Qty: 30 | Refills: 0
Start: 2020-08-12 | End: 2020-09-10

## 2020-08-12 RX ORDER — APIXABAN 2.5 MG/1
1 TABLET, FILM COATED ORAL
Qty: 60 | Refills: 0
Start: 2020-08-12 | End: 2020-09-10

## 2020-08-12 RX ORDER — APIXABAN 2.5 MG/1
1 TABLET, FILM COATED ORAL
Qty: 0 | Refills: 0 | DISCHARGE

## 2020-08-12 RX ORDER — ATORVASTATIN CALCIUM 80 MG/1
1 TABLET, FILM COATED ORAL
Qty: 30 | Refills: 0
Start: 2020-08-12 | End: 2020-09-10

## 2020-08-12 RX ORDER — APIXABAN 2.5 MG/1
1 TABLET, FILM COATED ORAL
Qty: 30 | Refills: 0
Start: 2020-08-12 | End: 2020-09-10

## 2020-08-12 RX ORDER — METOPROLOL TARTRATE 50 MG
1 TABLET ORAL
Qty: 0 | Refills: 0 | DISCHARGE

## 2020-08-12 RX ORDER — FUROSEMIDE 40 MG
1 TABLET ORAL
Qty: 60 | Refills: 0
Start: 2020-08-12 | End: 2020-09-10

## 2020-08-12 RX ORDER — METFORMIN HYDROCHLORIDE 850 MG/1
1 TABLET ORAL
Qty: 60 | Refills: 0
Start: 2020-08-12 | End: 2020-09-10

## 2020-08-12 RX ORDER — METOPROLOL TARTRATE 50 MG
1 TABLET ORAL
Qty: 30 | Refills: 0
Start: 2020-08-12 | End: 2020-09-10

## 2020-08-12 RX ADMIN — SACUBITRIL AND VALSARTAN 1 TABLET(S): 24; 26 TABLET, FILM COATED ORAL at 05:21

## 2020-08-12 RX ADMIN — Medication 50 MILLIGRAM(S): at 05:21

## 2020-08-12 RX ADMIN — APIXABAN 5 MILLIGRAM(S): 2.5 TABLET, FILM COATED ORAL at 05:21

## 2020-08-12 RX ADMIN — Medication 40 MILLIGRAM(S): at 05:20

## 2020-08-12 NOTE — DISCHARGE NOTE PROVIDER - NSDCCPCAREPLAN_GEN_ALL_CORE_FT
PRINCIPAL DISCHARGE DIAGNOSIS  Diagnosis: Chest pain  Assessment and Plan of Treatment: Chest Pain  Chest pain can be caused by many different conditions which may or may not be dangerous. Causes include heartburn, lung infections, heart attack, blood clot in lungs, skin infections, strain or damage to muscle, cartilage, or bones, etc. In addition to a history and physical examination, an electrocardiogram (ECG) or other lab tests may have been performed to determine the cause of your chest pain. Follow up with your primary care provider or with a cardiologist as instructed.   Your chest pain was most likley from your heart issues and from not taking your medications. Please take all your medcations as prescribed and call your doctor if you are unable to fill a prescription.  SEEK IMMEDIATE MEDICAL CARE IF YOU HAVE ANY OF THE FOLLOWING SYMPTOMS: worsening chest pain, coughing up blood, unexplained back/neck/jaw pain, severe abdominal pain, dizziness or lightheadedness, fainting, shortness of breath, sweaty or clammy skin, vomiting, or racing heart beat. These symptoms may represent a serious problem that is an emergency. Do not wait to see if the symptoms will go away. Get medical help right away. Call 911 and do not drive yourself to the hospital.

## 2020-08-12 NOTE — DISCHARGE NOTE PROVIDER - NSDCFUADDINST_GEN_ALL_CORE_FT
Please follow up with your regular doctor for further care and health maintenance.  We included numbers for your general doctor and for an endocrinologist. Please follow up with both.  Please take all your medications as prescribed. Please follow up with your regular doctor for further care and health maintenance.  We included numbers for your general doctor, a cardiologist, and for an endocrinologist. Please follow up with both.  Please take all your medications as prescribed.

## 2020-08-12 NOTE — DISCHARGE NOTE NURSING/CASE MANAGEMENT/SOCIAL WORK - PATIENT PORTAL LINK FT
You can access the FollowMyHealth Patient Portal offered by [facility] by registering at the following website: [email or web address]. By joining [employer]’s FollowMyHealth portal, you will also be able to view your health information using other applications (apps) compatible with our system.

## 2020-08-12 NOTE — DISCHARGE NOTE PROVIDER - PROVIDER TOKENS
PROVIDER:[TOKEN:[[id number]],FOLLOWUP:[1 week]],PROVIDER:[TOKEN:[[id number]],FOLLOWUP:[2 weeks]] PROVIDER:[TOKEN:[[id number]],FOLLOWUP:[1 week]],PROVIDER:[TOKEN:[[id number]],FOLLOWUP:[2 weeks]],PROVIDER:[TOKEN:[[id number]],FOLLOWUP:[1 week]]

## 2020-08-12 NOTE — PROGRESS NOTE ADULT - ASSESSMENT
A 46yo male with PMH of heart failure with reduced ejection fraction (EF 30%) s/p AICD, thoracic aortic aneurysm (4.7cm), and atrial fibrillation not on anticoagulation who presented to the ED complaining of dyspnea on exertion and orthopnea. Also C/O CP radiating to Lt arm.     1.	CP musculoskeletal  2.	SOB due to chronic HFrEF  3.	S/P AICD  4.	A-Fib not on A/C  5.	Ascending aortic aneurysm (4.7 cm)  6.	New onset DM-2  7.	Morbid obesity          PLAN:    ·	ECHO reviewed. EF is 40-45%. Ascending aortic 4.7 cm aneurysm.   ·	Care D/W the cardiology. Pt had cath about 2 years ago and had normal coronaries. Pt has NICM. CP is musculoskeletal.   ·	CE x 3 are negative  ·	Will switch him to Lisinopril 5 mg po daily and Metoprolol succinate 100 mg po daily.   ·	Cardiology recommended AICD interrogation by EP  ·	Pt has no Insurance. D/W the . Will give him one month supply of meds on D/C  ·	Hb A1c is 7.4. New onset DM-2. Advised to reduce wt. Started him on Metformin 500 mg po q 12h with meals.   ·	Cont Lasix 40 mg po q 12h.    ·	Low salt diet and water restriction to 1.5 L/D  ·	D/C planning.

## 2020-08-12 NOTE — DISCHARGE NOTE PROVIDER - NSDCMRMEDTOKEN_GEN_ALL_CORE_FT
atorvastatin 40 mg oral tablet: 1 tab(s) orally once a day (at bedtime)  Eliquis 5 mg oral tablet: 1 tab(s) orally 2 times a day   Eliquis 5 mg oral tablet: 1 tab(s) orally 2 times a day  furosemide 40 mg oral tablet: 1 tab(s) orally 2 times a day  Lasix 40 mg oral tablet: 1 tab(s) orally 2 times a day   lisinopril 10 mg oral tablet: 1 tab(s) orally once a day   metFORMIN 500 mg oral tablet: 1 tab(s) orally 2 times a day   Metoprolol Succinate  mg oral tablet, extended release: 1 tab(s) orally once a day   metoprolol tartrate 50 mg oral tablet: 1 tab(s) orally 2 times a day  sacubitril-valsartan 49 mg-51 mg oral tablet: 1 tab(s) orally 2 times a day atorvastatin 40 mg oral tablet: 1 tab(s) orally once a day (at bedtime)  Eliquis 5 mg oral tablet: 1 tab(s) orally 2 times a day   furosemide 40 mg oral tablet: 1 tab(s) orally 2 times a day  Lasix 40 mg oral tablet: 1 tab(s) orally 2 times a day   lisinopril 5 mg oral tablet: 1 tab(s) orally once a day   metFORMIN 500 mg oral tablet: 1 tab(s) orally 2 times a day   Metoprolol Succinate  mg oral tablet, extended release: 1 tab(s) orally once a day

## 2020-08-12 NOTE — PROGRESS NOTE ADULT - SUBJECTIVE AND OBJECTIVE BOX
JOSHUAMATT LANDAASHLEY  45y Male    CHIEF COMPLAINT:    Patient is a 45y old  Male who presents with a chief complaint of rule out ACS (12 Aug 2020 10:31)      INTERVAL HPI/OVERNIGHT EVENTS:    Patient seen and examined. Feels better today. No sob. No cp. No palpitations.     ROS: All other systems are negative.    Vital Signs:    T(F): 98.3 (20 @ 04:55), Max: 98.3 (20 @ 04:55)  HR: 64 (20 @ 04:55) (64 - 75)  BP: 120/69 (20 @ 04:55) (120/69 - 131/90)  RR: 18 (20 @ 04:55) (14 - 18)  SpO2: 97% (20 @ 07:37) (97% - 97%)  I&O's Summary    11 Aug 2020 07  -  12 Aug 2020 07:00  --------------------------------------------------------  IN: 840 mL / OUT: 0 mL / NET: 840 mL    12 Aug 2020 07:  -  12 Aug 2020 11:54  --------------------------------------------------------  IN: 100 mL / OUT: 0 mL / NET: 100 mL      Daily Height in cm: 180.34 (11 Aug 2020 13:53)    Daily Weight in k.2 (12 Aug 2020 04:55)  CAPILLARY BLOOD GLUCOSE      POCT Blood Glucose.: 166 mg/dL (12 Aug 2020 11:10)  POCT Blood Glucose.: 123 mg/dL (12 Aug 2020 08:17)  POCT Blood Glucose.: 132 mg/dL (12 Aug 2020 04:57)  POCT Blood Glucose.: 140 mg/dL (11 Aug 2020 21:54)      PHYSICAL EXAM:    GENERAL:  NAD  SKIN: No rashes or lesions  HENT: Atraumatic Normocephalic. PERRL. Moist membranes.  NECK: Supple, No JVD. No lymphadenopathy.  PULMONARY: CTA B/L. No wheezing. No rales  CVS: Normal S1, S2. Rate and Rhythm are regular. No murmurs.  ABDOMEN/GI: Soft, Nontender, Nondistended; BS present  EXTREMITIES: Peripheral pulses intact. No edema B/L LE.  NEUROLOGIC:  No motor or sensory deficit.  PSYCH: Alert & oriented x 3    Consultant(s) Notes Reviewed:  [x ] YES  [ ] NO  Care Discussed with Consultants/Other Providers [ x] YES  [ ] NO    EKG reviewed  Telemetry reviewed    LABS:                        15.8   8.17  )-----------( 189      ( 12 Aug 2020 05:18 )             49.2         138  |  100  |  14  ----------------------------<  142<H>  4.2   |  28  |  0.9    Ca    9.2      12 Aug 2020 05:18  Mg     2.0         TPro  6.9  /  Alb  4.1  /  TBili  0.6  /  DBili  x   /  AST  18  /  ALT  24  /  AlkPhos  86      PT/INR - ( 10 Aug 2020 12:15 )   PT: 11.80 sec;   INR: 1.03 ratio         PTT - ( 10 Aug 2020 12:15 )  PTT:27.5 sec  Serum Pro-Brain Natriuretic Peptide: 131 pg/mL (08-10-20 @ 12:15)    Trop <0.01, CKMB --, CK --, 20 @ 06:18  Trop <0.01, CKMB --, CK --, 08-10-20 @ 20:35  Trop <0.01, CKMB --, CK --, 08-10-20 @ 12:15        RADIOLOGY & ADDITIONAL TESTS:    < from: Transthoracic Echocardiogram (20 @ 11:06) >    Summary:   1. Left ventricular ejection fraction, by visual estimation, is 40 to 45%.   2. Mildly decreased global left ventricular systolic function.   3. Mildly increased LV wall thickness.   4. Mild aortic regurgitation.   5. Dilatation of the ascending aorta.   6. Asc aorta 4.8 cm.   7. No evidence of aortic stenosis.    < end of copied text >    Imaging or report Personally Reviewed:  [ ] YES  [ ] NO    Medications:  Standing  apixaban 5 milliGRAM(s) Oral two times a day  atorvastatin 40 milliGRAM(s) Oral at bedtime  chlorhexidine 4% Liquid 1 Application(s) Topical <User Schedule>  dextrose 5%. 1000 milliLiter(s) IV Continuous <Continuous>  dextrose 50% Injectable 12.5 Gram(s) IV Push once  dextrose 50% Injectable 25 Gram(s) IV Push once  dextrose 50% Injectable 25 Gram(s) IV Push once  furosemide    Tablet 40 milliGRAM(s) Oral two times a day  insulin glargine Injectable (LANTUS) 15 Unit(s) SubCutaneous at bedtime  insulin lispro Injectable (HumaLOG) 5 Unit(s) SubCutaneous three times a day before meals  metoprolol tartrate 50 milliGRAM(s) Oral two times a day  sacubitril 49 mG/valsartan 51 mG 1 Tablet(s) Oral two times a day    PRN Meds  dextrose 40% Gel 15 Gram(s) Oral once PRN  glucagon  Injectable 1 milliGRAM(s) IntraMuscular once PRN      Case discussed with resident    Care discussed with pt/family

## 2020-08-12 NOTE — PROGRESS NOTE ADULT - SUBJECTIVE AND OBJECTIVE BOX
BASHIR ALCOCER 45y Male  MRN#: [id number]   CODE STATUS:________      SUBJECTIVE  Patient is a 45y old Male who presents with a chief complaint of chest pain, rule out ACS (11 Aug 2020 19:10).  He reported left-sided chest pain radiating to left arm, "electric" sensation. This occurred at 2am, and patient visited ED that morning.    HPI:  He has experienced associated worsening dyspnea with exertion, and occasionally dyspnea without exertion, or last 3-4 days.  Has not been taking his medications for the last month due to lack of insurance.   Today is hospital day 2d, and this morning he appears alert, comfortable, and ambulating, and reports no overnight events.     Present Today:           White Catheter ()No/ ()Yes? Indication:          Central Line ()No/ ()Yes? Indication:          IV Fluids ()No/ ()Yes? Type:  Rate:  Indication:      OBJECTIVE  PAST MEDICAL & SURGICAL HISTORY  History of abdominal aortic aneurysm  Atrial fibrillation  Chronic HFrEF (heart failure with reduced ejection fraction)  HTN (hypertension)  S/P implantation of automatic cardioverter/defibrillator (AICD)    ALLERGIES:  No Known Allergies    MEDICATIONS:  STANDING MEDICATIONS  apixaban 5 milliGRAM(s) Oral two times a day  atorvastatin 40 milliGRAM(s) Oral at bedtime  chlorhexidine 4% Liquid 1 Application(s) Topical <User Schedule>  dextrose 5%. 1000 milliLiter(s) IV Continuous <Continuous>  dextrose 50% Injectable 12.5 Gram(s) IV Push once  dextrose 50% Injectable 25 Gram(s) IV Push once  dextrose 50% Injectable 25 Gram(s) IV Push once  furosemide    Tablet 40 milliGRAM(s) Oral two times a day  insulin glargine Injectable (LANTUS) 15 Unit(s) SubCutaneous at bedtime  insulin lispro Injectable (HumaLOG) 5 Unit(s) SubCutaneous three times a day before meals  metoprolol tartrate 50 milliGRAM(s) Oral two times a day  sacubitril 49 mG/valsartan 51 mG 1 Tablet(s) Oral two times a day    PRN MEDICATIONS  dextrose 40% Gel 15 Gram(s) Oral once PRN  glucagon  Injectable 1 milliGRAM(s) IntraMuscular once PRN      VITAL SIGNS: Last 24 Hours  T(C): 36.8 (12 Aug 2020 04:55), Max: 36.8 (12 Aug 2020 04:55)  T(F): 98.3 (12 Aug 2020 04:55), Max: 98.3 (12 Aug 2020 04:55)  HR: 64 (12 Aug 2020 04:55) (64 - 75)  BP: 120/69 (12 Aug 2020 04:55) (120/69 - 131/90)  BP(mean): --  RR: 18 (12 Aug 2020 04:55) (14 - 18)  SpO2: 97% (12 Aug 2020 07:37) (97% - 97%)    LABS:                        15.8   8.17  )-----------( 189      ( 12 Aug 2020 05:18 )             49.2     08-12    138  |  100  |  14  ----------------------------<  142<H>  4.2   |  28  |  0.9    Ca    9.2      12 Aug 2020 05:18  Mg     2.0     08-12    TPro  6.9  /  Alb  4.1  /  TBili  0.6  /  DBili  x   /  AST  18  /  ALT  24  /  AlkPhos  86  08-12    PT/INR - ( 10 Aug 2020 12:15 )   PT: 11.80 sec;   INR: 1.03 ratio         PTT - ( 10 Aug 2020 12:15 )  PTT:27.5 sec          CARDIAC MARKERS ( 11 Aug 2020 06:18 )  x     / <0.01 ng/mL / x     / x     / x      CARDIAC MARKERS ( 10 Aug 2020 20:35 )  x     / <0.01 ng/mL / x     / x     / x      CARDIAC MARKERS ( 10 Aug 2020 12:15 )  x     / <0.01 ng/mL / x     / x     / x          RADIOLOGY:      PHYSICAL EXAM:    GENERAL: NAD, well-developed, AAOx3  HEENT:  Atraumatic, Normocephalic. EOMI, PERRLA, conjunctiva and sclera clear, No JVD  PULMONARY: Clear to auscultation bilaterally; No wheeze. Some end-expiratory crackles  CARDIOVASCULAR: Regular rate and rhythm; No murmurs, rubs, or gallops  GASTROINTESTINAL: Soft, Nontender, Nondistended; Bowel sounds present  MUSCULOSKELETAL:  2+ Peripheral Pulses, No clubbing, cyanosis, or edema  NEUROLOGY: non-focal  SKIN: No rashes or lesions      ADMISSION SUMMARY  Patient is a 45y old Male who presents with a chief complaint of rule out ACS (11 Aug 2020 19:10)  Currently admitted to medicine with the primary diagnosis of Chest pain      ASSESSMENT & PLAN    #CHEST PAIN- Insufficient evidence for ACS or PE. Suspected exacerbation of HFrEF from medication nonadherence.    #Diabetes mellitus type 2-  #History of abdominal aortic aneurysm    #atrial fibrillation- ECG shows regular rate and rhythm. Changing treatment to   #Chronic HFrEF (heart failure with reduced ejection fraction)  #HTN (hypertension)        Present today:  ( ) Congestive Heart Failure, Yes? ( )Acute / ( )Acute on Chronic / ( )Chronic  :  ( )Systolic / ( )Diastolic               Plan:  ( ) Complicated Pneumonia, Type?  ( )Parapneumonic effusion / ( )Abscess / ( ) Multilobar / ( )Other               Plan:  ( ) Morbid Obesity, Yes? BMI:               Plan:  ( ) Functional Quadriplegia               Plan:  ( ) Encephalopathy               Plan:    ( ) Discussion with patient and/or family regarding goals of care  ( ) Discussed Case and Plan with Medical Attending, Name:      # Planned Disposition: ________ BASHIR ALCOCER 45y Male  MRN#: [id number]   CODE STATUS:________      SUBJECTIVE  Patient is a 45y old Male who presents with a chief complaint of chest pain, rule out ACS (11 Aug 2020 19:10).  He reported left-sided chest pain radiating to left arm, "electric" sensation. This occurred at 2am, and patient visited ED that morning.    HPI:  He has experienced associated worsening dyspnea with exertion, and occasionally dyspnea without exertion, or last 3-4 days.  Patient has not been taking his medications for the last month due to lack of insurance.   Today is hospital day 2d, and this morning he appears alert, comfortable, and ambulating, and reports no overnight events.     Present Today:           White Catheter ()No/ ()Yes? Indication:          Central Line ()No/ ()Yes? Indication:          IV Fluids ()No/ ()Yes? Type:  Rate:  Indication:      OBJECTIVE  PAST MEDICAL & SURGICAL HISTORY  History of abdominal aortic aneurysm  Atrial fibrillation  Chronic HFrEF (heart failure with reduced ejection fraction)  HTN (hypertension)  S/P implantation of automatic cardioverter/defibrillator (AICD)    ALLERGIES:  No Known Allergies    MEDICATIONS:  STANDING MEDICATIONS  apixaban 5 milliGRAM(s) Oral two times a day  atorvastatin 40 milliGRAM(s) Oral at bedtime  chlorhexidine 4% Liquid 1 Application(s) Topical <User Schedule>  dextrose 5%. 1000 milliLiter(s) IV Continuous <Continuous>  dextrose 50% Injectable 12.5 Gram(s) IV Push once  dextrose 50% Injectable 25 Gram(s) IV Push once  dextrose 50% Injectable 25 Gram(s) IV Push once  furosemide    Tablet 40 milliGRAM(s) Oral two times a day  insulin glargine Injectable (LANTUS) 15 Unit(s) SubCutaneous at bedtime  insulin lispro Injectable (HumaLOG) 5 Unit(s) SubCutaneous three times a day before meals  metoprolol tartrate 50 milliGRAM(s) Oral two times a day  sacubitril 49 mG/valsartan 51 mG 1 Tablet(s) Oral two times a day    PRN MEDICATIONS  dextrose 40% Gel 15 Gram(s) Oral once PRN  glucagon  Injectable 1 milliGRAM(s) IntraMuscular once PRN      VITAL SIGNS: Last 24 Hours  T(C): 36.8 (12 Aug 2020 04:55), Max: 36.8 (12 Aug 2020 04:55)  T(F): 98.3 (12 Aug 2020 04:55), Max: 98.3 (12 Aug 2020 04:55)  HR: 64 (12 Aug 2020 04:55) (64 - 75)  BP: 120/69 (12 Aug 2020 04:55) (120/69 - 131/90)  BP(mean): --  RR: 18 (12 Aug 2020 04:55) (14 - 18)  SpO2: 97% (12 Aug 2020 07:37) (97% - 97%)    LABS:                        15.8   8.17  )-----------( 189      ( 12 Aug 2020 05:18 )             49.2     08-12    138  |  100  |  14  ----------------------------<  142<H>  4.2   |  28  |  0.9    Ca    9.2      12 Aug 2020 05:18  Mg     2.0     08-12    TPro  6.9  /  Alb  4.1  /  TBili  0.6  /  DBili  x   /  AST  18  /  ALT  24  /  AlkPhos  86  08-12    PT/INR - ( 10 Aug 2020 12:15 )   PT: 11.80 sec;   INR: 1.03 ratio         PTT - ( 10 Aug 2020 12:15 )  PTT:27.5 sec          CARDIAC MARKERS ( 11 Aug 2020 06:18 )  x     / <0.01 ng/mL / x     / x     / x      CARDIAC MARKERS ( 10 Aug 2020 20:35 )  x     / <0.01 ng/mL / x     / x     / x      CARDIAC MARKERS ( 10 Aug 2020 12:15 )  x     / <0.01 ng/mL / x     / x     / x          RADIOLOGY:      PHYSICAL EXAM:    GENERAL: NAD, well-developed, AAOx3  HEENT:  Atraumatic, Normocephalic. EOMI, PERRLA, conjunctiva and sclera clear, No JVD  PULMONARY: Clear to auscultation bilaterally; No wheeze. Some end-expiratory crackles  CARDIOVASCULAR: Regular rate and rhythm; No murmurs, rubs, or gallops  GASTROINTESTINAL: Soft, Nontender, Nondistended; Bowel sounds present  MUSCULOSKELETAL:  2+ Peripheral Pulses, No clubbing, cyanosis, or edema  NEUROLOGY: non-focal  SKIN: No rashes or lesions      ADMISSION SUMMARY  Patient is a 45y old Male who presents with a chief complaint of rule out ACS (11 Aug 2020 19:10)  Currently admitted to medicine with the primary diagnosis of Chest pain      ASSESSMENT & PLAN    #CHEST PAIN- Insufficient evidence for ACS or PE. Suspected exacerbation of HFrEF from medication nonadherence.    #Diabetes mellitus type 2- New onset, starting on Metformin 500 mg po q 12h with meals. Counseling for weight loss administered  #History of abdominal aortic aneurysm, (4.7 cm)    #atrial fibrillation- ECG shows regular rate and rhythm. Adding anticoagulant treatment  #Chronic HFrEF-   #HTN (hypertension)  #Insurance status        Present today:  ( ) Congestive Heart Failure, Yes? ( )Acute / ( )Acute on Chronic / ( )Chronic  :  ( )Systolic / ( )Diastolic               Plan:  ( ) Complicated Pneumonia, Type?  ( )Parapneumonic effusion / ( )Abscess / ( ) Multilobar / ( )Other               Plan:  ( ) Morbid Obesity, Yes? BMI:               Plan:  ( ) Functional Quadriplegia               Plan:  ( ) Encephalopathy               Plan:    ( ) Discussion with patient and/or family regarding goals of care  ( ) Discussed Case and Plan with Medical Attending, Name:      # Planned Disposition: ________ BASHIR ALCOCER 45y Male  MRN#: [id number]   CODE STATUS:________      SUBJECTIVE  Patient is a 45y old Male who presents with a chief complaint of chest pain, rule out ACS (11 Aug 2020 19:10).  He reported left-sided chest pain radiating to left arm, "electric" sensation. This occurred at 2am, and patient visited ED that morning.    HPI:  He has experienced associated worsening dyspnea with exertion, and occasionally dyspnea without exertion, or last 3-4 days.  Patient has not been taking his medications for the last month due to lack of insurance.   Today is hospital day 2d, and this morning he appears alert, comfortable, and ambulating, and reports no overnight events.     Present Today:           White Catheter ()No/ ()Yes? Indication:          Central Line ()No/ ()Yes? Indication:          IV Fluids ()No/ ()Yes? Type:  Rate:  Indication:      OBJECTIVE  PAST MEDICAL & SURGICAL HISTORY  History of abdominal aortic aneurysm  Atrial fibrillation  Chronic HFrEF (heart failure with reduced ejection fraction)  HTN (hypertension)  S/P implantation of automatic cardioverter/defibrillator (AICD)    ALLERGIES:  No Known Allergies    MEDICATIONS:  STANDING MEDICATIONS  apixaban 5 milliGRAM(s) Oral two times a day  atorvastatin 40 milliGRAM(s) Oral at bedtime  chlorhexidine 4% Liquid 1 Application(s) Topical <User Schedule>  dextrose 5%. 1000 milliLiter(s) IV Continuous <Continuous>  dextrose 50% Injectable 12.5 Gram(s) IV Push once  dextrose 50% Injectable 25 Gram(s) IV Push once  dextrose 50% Injectable 25 Gram(s) IV Push once  furosemide    Tablet 40 milliGRAM(s) Oral two times a day  insulin glargine Injectable (LANTUS) 15 Unit(s) SubCutaneous at bedtime  insulin lispro Injectable (HumaLOG) 5 Unit(s) SubCutaneous three times a day before meals  metoprolol tartrate 50 milliGRAM(s) Oral two times a day  sacubitril 49 mG/valsartan 51 mG 1 Tablet(s) Oral two times a day    PRN MEDICATIONS  dextrose 40% Gel 15 Gram(s) Oral once PRN  glucagon  Injectable 1 milliGRAM(s) IntraMuscular once PRN      VITAL SIGNS: Last 24 Hours  T(C): 36.8 (12 Aug 2020 04:55), Max: 36.8 (12 Aug 2020 04:55)  T(F): 98.3 (12 Aug 2020 04:55), Max: 98.3 (12 Aug 2020 04:55)  HR: 64 (12 Aug 2020 04:55) (64 - 75)  BP: 120/69 (12 Aug 2020 04:55) (120/69 - 131/90)  BP(mean): --  RR: 18 (12 Aug 2020 04:55) (14 - 18)  SpO2: 97% (12 Aug 2020 07:37) (97% - 97%)    LABS:                        15.8   8.17  )-----------( 189      ( 12 Aug 2020 05:18 )             49.2     08-12    138  |  100  |  14  ----------------------------<  142<H>  4.2   |  28  |  0.9    Ca    9.2      12 Aug 2020 05:18  Mg     2.0     08-12    TPro  6.9  /  Alb  4.1  /  TBili  0.6  /  DBili  x   /  AST  18  /  ALT  24  /  AlkPhos  86  08-12    PT/INR - ( 10 Aug 2020 12:15 )   PT: 11.80 sec;   INR: 1.03 ratio         PTT - ( 10 Aug 2020 12:15 )  PTT:27.5 sec          CARDIAC MARKERS ( 11 Aug 2020 06:18 )  x     / <0.01 ng/mL / x     / x     / x      CARDIAC MARKERS ( 10 Aug 2020 20:35 )  x     / <0.01 ng/mL / x     / x     / x      CARDIAC MARKERS ( 10 Aug 2020 12:15 )  x     / <0.01 ng/mL / x     / x     / x          RADIOLOGY:      PHYSICAL EXAM:    GENERAL: NAD, well-developed, AAOx3  HEENT:  Atraumatic, Normocephalic. EOMI, PERRLA, conjunctiva and sclera clear, No JVD  PULMONARY: Clear to auscultation bilaterally; No wheeze. Some end-expiratory crackles  CARDIOVASCULAR: Regular rate and rhythm; No murmurs, rubs, or gallops  GASTROINTESTINAL: Soft, Nontender, Nondistended; Bowel sounds present  MUSCULOSKELETAL:  2+ Peripheral Pulses, No clubbing, cyanosis, or edema  NEUROLOGY: non-focal  SKIN: No rashes or lesions      ADMISSION SUMMARY  Patient is a 45y old Male who presents with a chief complaint of rule out ACS (11 Aug 2020 19:10)  Currently admitted to medicine with the primary diagnosis of Chest pain      ASSESSMENT & PLAN    #CHEST PAIN- Care D/W the cardiology. Pt had cath about 2 years ago and had normal coronaries. Pt has NICM. CP is musculoskeletal.     #Diabetes mellitus type 2- New onset with Hba1c of 7.4, starting on Metformin 500 mg po q 12h with meals. Counseled and encouraged to lose weight.    #History of abdominal aortic aneurysm, (4.7 cm)-     #atrial fibrillation- ECG shows regular rate and rhythm. Adding anticoagulant treatment    #Chronic HFrEF- switching medications to Lisinopril 5 mg po daily and Metoprolol succinate 100 mg po daily.     #HTN (hypertension)- Low salt diet and water restriction to 1.5 L/D    #Insurance status- t has no Insurance. D/W the . Will give him one month supply of meds on D/C          Present today:  ( ) Congestive Heart Failure, Yes? ( )Acute / ( )Acute on Chronic / ( )Chronic  :  ( )Systolic / ( )Diastolic               Plan:  ( ) Complicated Pneumonia, Type?  ( )Parapneumonic effusion / ( )Abscess / ( ) Multilobar / ( )Other               Plan:  ( ) Morbid Obesity, Yes? BMI:               Plan:  ( ) Functional Quadriplegia               Plan:  ( ) Encephalopathy               Plan:    ( ) Discussion with patient and/or family regarding goals of care  ( ) Discussed Case and Plan with Medical Attending, Name:      # Planned Disposition: ________

## 2020-08-12 NOTE — DISCHARGE NOTE PROVIDER - CARE PROVIDERS DIRECT ADDRESSES
,DirectAddress_Unknown,[email or web address] ,DirectAddress_Unknown,[email or web address],DirectAddress_Unknown

## 2020-08-12 NOTE — DISCHARGE NOTE PROVIDER - CARE PROVIDER_API CALL
AUGUSTO GONSALES  [id number]  [address] [place] [address]  Phone: [phone]  Fax: ()-  Follow Up Time: 1 week    Haider Davila  INTERNAL MEDICINE  [address] [place] [address]  Phone: [phone]  Fax: [phone]  Follow Up Time: 2 weeks AUGUSTO GONSALES  [id number]  [address] [place] [address]  Phone: [phone]  Fax: ()-  Follow Up Time: 1 week    Haider Davila  INTERNAL MEDICINE  [address] [place] [address]  Phone: [phone]  Fax: [phone]  Follow Up Time: 2 weeks    Avelino Francisco  Cardiovascular Disease  [address] [place] [address]  Phone: [phone]  Fax: [phone]  Follow Up Time: 1 week

## 2020-08-12 NOTE — DISCHARGE NOTE PROVIDER - HOSPITAL COURSE
History of Present Illness:     [45y man]        CC: dyspnea on exertion        PMH: heart failure with reduced ejection fraction (EF 30%) s/p AICD, abdominal aortic aneurysm (4.7cm), atrial fibrillation not on anticoagulation        PSH: AICD        History of present illness goes back to 6 months ago when the patient was in [place] visiting his sister. He was seated at rest when he felt like "his heart wasn't beating right" and was unable to catch his breath. He went to a hospital in [place], where his medications aspirin and carvedilol were switched to Eliquis and metoprolol, respectively. He was in a good state of health after that hospital visit until about 3-4 days ago when the patient began complaining of shortness of breath and dyspnea on exertion. He becomes short of breath after walking less than a block. He also admits to orthopnea and requires two pillows to sleep at night. The patient felt a sharp, left sided chest pain that radiates to the left arm this morning at around 2am. He said was about 5-6/10. Out of fear of a heart attack, the patient decided to come to the ED.        The patient admits to not taking any of his medications over the past month due to insurance issues. Of note, the patient was admitted to [facility] on 9/2019 and 11/2019 for similar complaints but no acute intervention was performed during either visit.        In the ED, vital signs were Tmax 98.3F, HR 82, /87, RR 19, SpO2 99% on room air. Patient was given aspirin 325mg PO. Troponin negative x1, pro-, and D-dimer 69.        Hospital Course:        #Atypical chest pain, likely secondary to medication non-compliance vs unlikely ACS    - Pain is described as sharp, left-sided chest pain radiating down left arm    - Patient admits to not taking any medication for the past month due to insurance issues    - D-dimer: 69 (doubt pulmonary embolism)    - Pro-BNP: 131 (doubt acute on chronic HFrEF exacerbation)    - Troponin negative x2 (less likely ACS)    - Trend troponin    - Interrogate AICD --> EP unable to interrogate AICD as this a defibrillator that only shocks, no recording    - Echo 08/11/20: EF 45-50%, mildly decreased global left ventricular systolic function, mildly increased LV wall thickness, mild AR, Ascending aortic aneurysm 4.8cm     - Restarted all home medications (furosemide 40mg PO BID, metoprolol tartrate 50mg PO BID and Entresto 49-51mg PO BID)    - d/c planning for tomorrow         #Hyperglycemia    - Noted on CMP on admission    - hemoglobin A1c 7.4, Glucose 289    - started on Lantus 15U at bedtime with Lispro 5U qhs History of Present Illness:     [45y man]        CC: dyspnea on exertion        PMH: heart failure with reduced ejection fraction (EF 30%) s/p AICD, abdominal aortic aneurysm (4.7cm), atrial fibrillation not on anticoagulation        PSH: AICD        History of present illness goes back to 6 months ago when the patient was in [place] visiting his sister. He was seated at rest when he felt like "his heart wasn't beating right" and was unable to catch his breath. He went to a hospital in [place], where his medications aspirin and carvedilol were switched to Eliquis and metoprolol, respectively. He was in a good state of health after that hospital visit until about 3-4 days ago when the patient began complaining of shortness of breath and dyspnea on exertion. He becomes short of breath after walking less than a block. He also admits to orthopnea and requires two pillows to sleep at night. The patient felt a sharp, left sided chest pain that radiates to the left arm this morning at around 2am. He said was about 5-6/10. Out of fear of a heart attack, the patient decided to come to the ED.        The patient admits to not taking any of his medications over the past month due to insurance issues. Of note, the patient was admitted to [facility] on 9/2019 and 11/2019 for similar complaints but no acute intervention was performed during either visit.        In the ED, vital signs were Tmax 98.3F, HR 82, /87, RR 19, SpO2 99% on room air. Patient was given aspirin 325mg PO. Troponin negative x1, pro-, and D-dimer 69.        Hospital Course:        #Atypical chest pain, likely secondary to medication non-compliance, not likely ACS    - Pain is described as sharp, left-sided chest pain radiating down left arm    - Patient admits to not taking any medication for the past month due to insurance issues, [facility] and he is able to cover to intial price    - D-dimer: 69 (doubt pulmonary embolism)    - Pro-BNP: 131 (doubt acute on chronic HFrEF exacerbation)    - Troponin negative x2 (less likely ACS), AICD unable to be intergated based on model, Echo 08/11/20: EF 45-50%, mildly decreased global left ventricular systolic function, mildly increased LV wall thickness, mild AR, Ascending aortic aneurysm 4.8cm    - A1c 7.4, fs between 140-290    - prescribed the pt linisopril 5mg, metoprolol succinate 100, metformin 500 BID, statin 40mg, eliquis 5mg BID     - pt is asymtopatic and is stable for d/c        #Hyperglycemia    - Noted on CMP on admission    - hemoglobin A1c 7.4, Glucose 289    - started on Lantus 15U at bedtime with Lispro 5U qhs    - metformin 500 BID    - f/u with endocrinology as an outpt

## 2020-08-17 DIAGNOSIS — R07.89 OTHER CHEST PAIN: ICD-10-CM

## 2020-08-17 DIAGNOSIS — I71.2 THORACIC AORTIC ANEURYSM, WITHOUT RUPTURE: ICD-10-CM

## 2020-08-17 DIAGNOSIS — I11.0 HYPERTENSIVE HEART DISEASE WITH HEART FAILURE: ICD-10-CM

## 2020-08-17 DIAGNOSIS — R07.9 CHEST PAIN, UNSPECIFIED: ICD-10-CM

## 2020-08-17 DIAGNOSIS — Z79.01 LONG TERM (CURRENT) USE OF ANTICOAGULANTS: ICD-10-CM

## 2020-08-17 DIAGNOSIS — Z59.7 INSUFFICIENT SOCIAL INSURANCE AND WELFARE SUPPORT: ICD-10-CM

## 2020-08-17 DIAGNOSIS — I48.0 PAROXYSMAL ATRIAL FIBRILLATION: ICD-10-CM

## 2020-08-17 DIAGNOSIS — Z91.14 PATIENT'S OTHER NONCOMPLIANCE WITH MEDICATION REGIMEN: ICD-10-CM

## 2020-08-17 DIAGNOSIS — G57.10 MERALGIA PARESTHETICA, UNSPECIFIED LOWER LIMB: ICD-10-CM

## 2020-08-17 DIAGNOSIS — E11.65 TYPE 2 DIABETES MELLITUS WITH HYPERGLYCEMIA: ICD-10-CM

## 2020-08-17 DIAGNOSIS — I42.8 OTHER CARDIOMYOPATHIES: ICD-10-CM

## 2020-08-17 DIAGNOSIS — Z95.810 PRESENCE OF AUTOMATIC (IMPLANTABLE) CARDIAC DEFIBRILLATOR: ICD-10-CM

## 2020-08-17 DIAGNOSIS — E66.01 MORBID (SEVERE) OBESITY DUE TO EXCESS CALORIES: ICD-10-CM

## 2020-08-17 DIAGNOSIS — I50.22 CHRONIC SYSTOLIC (CONGESTIVE) HEART FAILURE: ICD-10-CM

## 2020-08-17 SDOH — ECONOMIC STABILITY - INCOME SECURITY: INSUFFICIENT SOCIAL INSURANCE AND WELFARE SUPPORT: Z59.7

## 2020-08-18 ENCOUNTER — FORM ENCOUNTER (OUTPATIENT)
Age: 45
End: 2020-08-18

## 2020-08-19 PROBLEM — I48.91 UNSPECIFIED ATRIAL FIBRILLATION: Chronic | Status: ACTIVE | Noted: 2020-08-10

## 2020-08-19 PROBLEM — I50.22 CHRONIC SYSTOLIC (CONGESTIVE) HEART FAILURE: Chronic | Status: ACTIVE | Noted: 2020-08-10

## 2020-08-19 PROBLEM — Z86.79 PERSONAL HISTORY OF OTHER DISEASES OF THE CIRCULATORY SYSTEM: Chronic | Status: ACTIVE | Noted: 2020-08-10

## 2020-10-26 ENCOUNTER — NON-APPOINTMENT (OUTPATIENT)
Age: 45
End: 2020-10-26

## 2020-11-03 ENCOUNTER — APPOINTMENT (OUTPATIENT)
Dept: CARDIOLOGY | Facility: CLINIC | Age: 45
End: 2020-11-03

## 2020-11-12 ENCOUNTER — FORM ENCOUNTER (OUTPATIENT)
Age: 45
End: 2020-11-12

## 2021-01-06 NOTE — ED ADULT NURSE NOTE - NS ED NURSE LEVEL OF CONSCIOUSNESS MENTAL STATUS
Alert
[FreeTextEntry1] : I tried to manage the patient and her husbands expectations regarding the planned robotically assisted laparoscopic repair of multiple ventral hernias with mesh, possible open, possible bilateral inguinal hernia repairs with mesh. \par She would like to proceed with the surgery which we will schedule for next week. She has already undergone PST and will re-test for Covid 489 to 72 hrs prior to surgery.

## 2021-01-26 ENCOUNTER — INPATIENT (INPATIENT)
Facility: HOSPITAL | Age: 46
LOS: 0 days | Discharge: HOME | End: 2021-01-27
Attending: INTERNAL MEDICINE | Admitting: INTERNAL MEDICINE
Payer: MEDICAID

## 2021-01-26 VITALS
HEART RATE: 77 BPM | SYSTOLIC BLOOD PRESSURE: 117 MMHG | OXYGEN SATURATION: 99 % | TEMPERATURE: 96 F | HEIGHT: 71 IN | WEIGHT: 300.05 LBS | RESPIRATION RATE: 18 BRPM | DIASTOLIC BLOOD PRESSURE: 65 MMHG

## 2021-01-26 DIAGNOSIS — Z95.810 PRESENCE OF AUTOMATIC (IMPLANTABLE) CARDIAC DEFIBRILLATOR: Chronic | ICD-10-CM

## 2021-01-26 LAB
ALBUMIN SERPL ELPH-MCNC: 3.9 G/DL — SIGNIFICANT CHANGE UP (ref 3.5–5.2)
ALP SERPL-CCNC: 85 U/L — SIGNIFICANT CHANGE UP (ref 30–115)
ALT FLD-CCNC: 23 U/L — SIGNIFICANT CHANGE UP (ref 0–41)
ANION GAP SERPL CALC-SCNC: 7 MMOL/L — SIGNIFICANT CHANGE UP (ref 7–14)
APTT BLD: 28.7 SEC — SIGNIFICANT CHANGE UP (ref 27–39.2)
AST SERPL-CCNC: 23 U/L — SIGNIFICANT CHANGE UP (ref 0–41)
BASOPHILS # BLD AUTO: 0.05 K/UL — SIGNIFICANT CHANGE UP (ref 0–0.2)
BASOPHILS NFR BLD AUTO: 0.7 % — SIGNIFICANT CHANGE UP (ref 0–1)
BILIRUB SERPL-MCNC: 0.4 MG/DL — SIGNIFICANT CHANGE UP (ref 0.2–1.2)
BLD GP AB SCN SERPL QL: SIGNIFICANT CHANGE UP
BUN SERPL-MCNC: 15 MG/DL — SIGNIFICANT CHANGE UP (ref 10–20)
CALCIUM SERPL-MCNC: 9.1 MG/DL — SIGNIFICANT CHANGE UP (ref 8.5–10.1)
CHLORIDE SERPL-SCNC: 102 MMOL/L — SIGNIFICANT CHANGE UP (ref 98–110)
CO2 SERPL-SCNC: 28 MMOL/L — SIGNIFICANT CHANGE UP (ref 17–32)
CREAT SERPL-MCNC: 1.1 MG/DL — SIGNIFICANT CHANGE UP (ref 0.7–1.5)
EOSINOPHIL # BLD AUTO: 0.13 K/UL — SIGNIFICANT CHANGE UP (ref 0–0.7)
EOSINOPHIL NFR BLD AUTO: 1.8 % — SIGNIFICANT CHANGE UP (ref 0–8)
GLUCOSE SERPL-MCNC: 224 MG/DL — HIGH (ref 70–99)
HCT VFR BLD CALC: 43 % — SIGNIFICANT CHANGE UP (ref 42–52)
HGB BLD-MCNC: 14.2 G/DL — SIGNIFICANT CHANGE UP (ref 14–18)
IMM GRANULOCYTES NFR BLD AUTO: 0.3 % — SIGNIFICANT CHANGE UP (ref 0.1–0.3)
INR BLD: 1.07 RATIO — SIGNIFICANT CHANGE UP (ref 0.65–1.3)
LYMPHOCYTES # BLD AUTO: 2.53 K/UL — SIGNIFICANT CHANGE UP (ref 1.2–3.4)
LYMPHOCYTES # BLD AUTO: 35.2 % — SIGNIFICANT CHANGE UP (ref 20.5–51.1)
MAGNESIUM SERPL-MCNC: 2 MG/DL — SIGNIFICANT CHANGE UP (ref 1.8–2.4)
MCHC RBC-ENTMCNC: 28.5 PG — SIGNIFICANT CHANGE UP (ref 27–31)
MCHC RBC-ENTMCNC: 33 G/DL — SIGNIFICANT CHANGE UP (ref 32–37)
MCV RBC AUTO: 86.2 FL — SIGNIFICANT CHANGE UP (ref 80–94)
MONOCYTES # BLD AUTO: 0.6 K/UL — SIGNIFICANT CHANGE UP (ref 0.1–0.6)
MONOCYTES NFR BLD AUTO: 8.4 % — SIGNIFICANT CHANGE UP (ref 1.7–9.3)
NEUTROPHILS # BLD AUTO: 3.85 K/UL — SIGNIFICANT CHANGE UP (ref 1.4–6.5)
NEUTROPHILS NFR BLD AUTO: 53.6 % — SIGNIFICANT CHANGE UP (ref 42.2–75.2)
NRBC # BLD: 0 /100 WBCS — SIGNIFICANT CHANGE UP (ref 0–0)
NT-PROBNP SERPL-SCNC: 21 PG/ML — SIGNIFICANT CHANGE UP (ref 0–300)
PLATELET # BLD AUTO: 179 K/UL — SIGNIFICANT CHANGE UP (ref 130–400)
POTASSIUM SERPL-MCNC: 4.6 MMOL/L — SIGNIFICANT CHANGE UP (ref 3.5–5)
POTASSIUM SERPL-SCNC: 4.6 MMOL/L — SIGNIFICANT CHANGE UP (ref 3.5–5)
PROT SERPL-MCNC: 6.3 G/DL — SIGNIFICANT CHANGE UP (ref 6–8)
PROTHROM AB SERPL-ACNC: 12.3 SEC — SIGNIFICANT CHANGE UP (ref 9.95–12.87)
RBC # BLD: 4.99 M/UL — SIGNIFICANT CHANGE UP (ref 4.7–6.1)
RBC # FLD: 12.8 % — SIGNIFICANT CHANGE UP (ref 11.5–14.5)
SARS-COV-2 RNA SPEC QL NAA+PROBE: SIGNIFICANT CHANGE UP
SODIUM SERPL-SCNC: 137 MMOL/L — SIGNIFICANT CHANGE UP (ref 135–146)
TROPONIN T SERPL-MCNC: <0.01 NG/ML — SIGNIFICANT CHANGE UP
WBC # BLD: 7.18 K/UL — SIGNIFICANT CHANGE UP (ref 4.8–10.8)
WBC # FLD AUTO: 7.18 K/UL — SIGNIFICANT CHANGE UP (ref 4.8–10.8)

## 2021-01-26 PROCEDURE — 93010 ELECTROCARDIOGRAM REPORT: CPT

## 2021-01-26 PROCEDURE — 74174 CTA ABD&PLVS W/CONTRAST: CPT | Mod: 26

## 2021-01-26 PROCEDURE — 71045 X-RAY EXAM CHEST 1 VIEW: CPT | Mod: 26

## 2021-01-26 PROCEDURE — 93010 ELECTROCARDIOGRAM REPORT: CPT | Mod: 77

## 2021-01-26 PROCEDURE — 99285 EMERGENCY DEPT VISIT HI MDM: CPT

## 2021-01-26 PROCEDURE — 71275 CT ANGIOGRAPHY CHEST: CPT | Mod: 26

## 2021-01-26 RX ORDER — SODIUM CHLORIDE 9 MG/ML
1000 INJECTION, SOLUTION INTRAVENOUS
Refills: 0 | Status: DISCONTINUED | OUTPATIENT
Start: 2021-01-26 | End: 2021-01-27

## 2021-01-26 RX ORDER — INSULIN GLARGINE 100 [IU]/ML
15 INJECTION, SOLUTION SUBCUTANEOUS AT BEDTIME
Refills: 0 | Status: DISCONTINUED | OUTPATIENT
Start: 2021-01-27 | End: 2021-01-27

## 2021-01-26 RX ORDER — FUROSEMIDE 40 MG
40 TABLET ORAL DAILY
Refills: 0 | Status: DISCONTINUED | OUTPATIENT
Start: 2021-01-26 | End: 2021-01-27

## 2021-01-26 RX ORDER — DEXTROSE 50 % IN WATER 50 %
15 SYRINGE (ML) INTRAVENOUS ONCE
Refills: 0 | Status: DISCONTINUED | OUTPATIENT
Start: 2021-01-26 | End: 2021-01-27

## 2021-01-26 RX ORDER — GLUCAGON INJECTION, SOLUTION 0.5 MG/.1ML
1 INJECTION, SOLUTION SUBCUTANEOUS ONCE
Refills: 0 | Status: DISCONTINUED | OUTPATIENT
Start: 2021-01-26 | End: 2021-01-27

## 2021-01-26 RX ORDER — ASPIRIN/CALCIUM CARB/MAGNESIUM 324 MG
81 TABLET ORAL ONCE
Refills: 0 | Status: COMPLETED | OUTPATIENT
Start: 2021-01-26 | End: 2021-01-26

## 2021-01-26 RX ORDER — DEXTROSE 50 % IN WATER 50 %
25 SYRINGE (ML) INTRAVENOUS ONCE
Refills: 0 | Status: DISCONTINUED | OUTPATIENT
Start: 2021-01-26 | End: 2021-01-27

## 2021-01-26 RX ORDER — METOPROLOL TARTRATE 50 MG
50 TABLET ORAL
Refills: 0 | Status: DISCONTINUED | OUTPATIENT
Start: 2021-01-26 | End: 2021-01-27

## 2021-01-26 RX ORDER — APIXABAN 2.5 MG/1
5 TABLET, FILM COATED ORAL EVERY 12 HOURS
Refills: 0 | Status: DISCONTINUED | OUTPATIENT
Start: 2021-01-26 | End: 2021-01-27

## 2021-01-26 RX ORDER — INSULIN LISPRO 100/ML
5 VIAL (ML) SUBCUTANEOUS
Refills: 0 | Status: DISCONTINUED | OUTPATIENT
Start: 2021-01-26 | End: 2021-01-27

## 2021-01-26 RX ORDER — PANTOPRAZOLE SODIUM 20 MG/1
40 TABLET, DELAYED RELEASE ORAL
Refills: 0 | Status: DISCONTINUED | OUTPATIENT
Start: 2021-01-26 | End: 2021-01-26

## 2021-01-26 RX ORDER — SACUBITRIL AND VALSARTAN 24; 26 MG/1; MG/1
1 TABLET, FILM COATED ORAL
Refills: 0 | Status: DISCONTINUED | OUTPATIENT
Start: 2021-01-26 | End: 2021-01-27

## 2021-01-26 RX ORDER — DEXTROSE 50 % IN WATER 50 %
12.5 SYRINGE (ML) INTRAVENOUS ONCE
Refills: 0 | Status: DISCONTINUED | OUTPATIENT
Start: 2021-01-26 | End: 2021-01-27

## 2021-01-26 RX ADMIN — Medication 50 MILLIGRAM(S): at 22:35

## 2021-01-26 RX ADMIN — APIXABAN 5 MILLIGRAM(S): 2.5 TABLET, FILM COATED ORAL at 22:35

## 2021-01-26 NOTE — H&P ADULT - NSHPPHYSICALEXAM_GEN_ALL_CORE
CONSTITUTIONAL: No acute distress, well-developed, well-groomed, AAOx3  HEAD: Atraumatic, normocephalic  EYES: EOM intact, PERRLA, conjunctiva and sclera clear  ENT: Supple, no masses, no thyromegaly, no bruits, no JVD; moist mucous membranes  PULMONARY: Clear to auscultation bilaterally; no wheezes, rales, or rhonchi  CARDIOVASCULAR: Regular rate and rhythm; no murmurs, rubs, or gallops  GASTROINTESTINAL: Soft, non-tender, non-distended; bowel sounds present  MUSCULOSKELETAL: 2+ peripheral pulses; no clubbing, no cyanosis, no edema  NEUROLOGY: AAO X4, non-focal  SKIN: No rashes or lesions; warm and dry

## 2021-01-26 NOTE — H&P ADULT - NSHPLABSRESULTS_GEN_ALL_CORE
LABS:                        14.2   7.18  )-----------( 179      ( 26 Jan 2021 20:02 )             43.0     26 Jan 2021 20:02    137    |  102    |  15     ----------------------------<  224    4.6     |  28     |  1.1      Ca    9.1        26 Jan 2021 20:02  Mg     2.0       26 Jan 2021 20:02    TPro  6.3    /  Alb  3.9    /  TBili  0.4    /  DBili  x      /  AST  23     /  ALT  23     /  AlkPhos  85     26 Jan 2021 20:02    PT/INR - ( 26 Jan 2021 20:02 )   PT: 12.30 sec;   INR: 1.07 ratio         PTT - ( 26 Jan 2021 20:02 )  PTT:28.7 sec  < from: CT Angio Chest w/ IV Cont (01.26.21 @ 19:50) >    IMPRESSION:    1. There is aneurysmal dilatation of the ascendingthoracic aorta, measuring 4.4 cm.    2. 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    3. There is no evidence of thoracic or abdominal aortic dissection.    4. There is no evidence of abdominal aortic aneurysm.    < end of copied text >

## 2021-01-26 NOTE — H&P ADULT - HISTORY OF PRESENT ILLNESS
45 yr M with NICM, HFrEF 45%, s/p AICD, Paroxysmal Afib on Eliquis HTN, DM, morbid obesity, Ascending aortic aneurysm presents due to chest pain.  pt denies fever, head45 yr M with NICM, HFrEF 45%, s/p AICD, Paroxysmal Afib on Eliquis HTN, DM, morbid obesity, Ascending aortic aneurysm presents due to chest pain.     the pt was doing well till 11am this morning, he felt left sided chest pain, felt like pressure, radiated to left shoulder, lasted for 10 min, associated with intermittent SOB, palpitation and epigastric pain and pulsating sensation over the upper abdomen,  the pain resolved without intervention, he had 2 episode of similar pain before coming to ER, lasted for few min and resolved with no intervention.  pt is complaints with his meds except the liptior and DM med he doesn't take them, he was discharged on lasix 40mg BID last admission, since then he only saw his cardiologist  via tele health 2 months ago, he takes lasix 2-3x/week as he never was SOB and had no legs swelling.     pt also has been having heartburn after eating since the past 2 weeks,   pt denies fever, headache, syncope, seizure, abd pain, N/V, diarrhea, bloody BM or melena,   pt has no insurance, [place],    In ER; VS wnl, EKG sinus Rhythm,  trop neg x1, CT angio stable AAA, no dissection, CXR and CT no significant pulm congestion, pt admitted for further eval

## 2021-01-26 NOTE — ED PROVIDER NOTE - PHYSICAL EXAMINATION
GENERAL: Well-nourished, Well-developed. NAD.  HEAD: Normocephalic, atraumatic  Eyes: PERRLA, EOMI. No asymmetry. No nystagmus. No conjunctival injection. Non-icteric sclera.  Neck: Supple. FROM  CVS: RRR. No reproducible chest wall tenderness. Normal S1,S2.   RESP: No use of accessory muscles. Chest rise symmetrical with good expansion. Lungs clear to auscultation B/L. No wheezing, rales, or rhonchi auscultated.  GI: Normal auscultation of bowel sounds in all 4 quadrants. Soft, Nontender, Nondistended. No palpable bounding aorta, no aortic brewi. No guarding or rebound tenderness.   Skin: Warm, Dry. No rashes or lesions. Good cap refill < 2 sec B/L.  EXT: Radial and pedal pulses present B/L. No calf tenderness or swelling B/L. No palpable cords. No pedal edema B/L. GENERAL: Well-nourished, Well-developed. NAD.  HEAD: Normocephalic, atraumatic  Eyes: PERRLA, EOMI. No asymmetry. No nystagmus. No conjunctival injection. Non-icteric sclera.  Neck: Supple. FROM  CVS: RRR. No reproducible chest wall tenderness. Normal S1,S2.   RESP: No use of accessory muscles. Chest rise symmetrical with good expansion. Lungs clear to auscultation B/L. No wheezing, rales, or rhonchi auscultated.  GI: Normal auscultation of bowel sounds in all 4 quadrants. Soft, Nontender, Nondistended. No palpable bounding pulsatile mass, no aortic bruit auscultated. No guarding or rebound tenderness.   Skin: Warm, Dry. No rashes or lesions. Good cap refill < 2 sec B/L.  EXT: Radial and pedal pulses present B/L. No calf tenderness or swelling B/L. No palpable cords. No pedal edema B/L.

## 2021-01-26 NOTE — H&P ADULT - NSICDXPASTMEDICALHX_GEN_ALL_CORE_FT
PAST MEDICAL HISTORY:  Atrial fibrillation     Chronic HFrEF (heart failure with reduced ejection fraction)     History of abdominal aortic aneurysm     HTN (hypertension)

## 2021-01-26 NOTE — H&P ADULT - ATTENDING COMMENTS
46 YO M with a PMH of NICM, HFrEF s/p AICD, Paroxysmal Afib (Eliquis), HTN, DM2, morbid obesity, and AAA who presents to the hospital with a c/o CP for the past x 1 day. Described as pressure, left-sided, radiating to LUE, and intermittent lasting only 10 minutes. - worse with exertion. Associated with + SOB, - nausea, and +diaphoresis. Did not take SLN or ASA prior to arrival. Denies any fevers/chills, cough, ABD pain, LE swelling, or rashes. Of note, pt is non-compliant with most of his medications. In the ED, cardiac enzymes were negative and an EKG showed no ischemic changes. Chest X-Ray and CTA-CAP w/ chronic findings only. ASA given in the ED.     Physical exam shows obese pt in NAD, resting comfortably. VSS, afebrile, not hypoxic on RA. A&Ox3. Neuro exam intact. CTA B/L with no W/C/R. RRR, no M/G/R. ABD is obese, soft and non-tender to palpation, normoactive BSs. LEs without swelling, pulses palpated bilaterally. No rashes. Labs and imaging as above resident note.     Chest pain, atypical, rule out ACS vs GERD. Admit to tele. Serial cardiac enzymes and EKGs. A1c, Lipids, and TSH. ASA given in the ED. Echo. Stress test out-pt. PRN pain meds. Restart home meds.   -If initial work-up is negative, pt can be discharged with out-pt cardio FU    Diabetes mellitus with hyperglycemia, medication non-compliance. A1c. FSs. Insulin PRN.     Hx of NICM, HFrEF s/p AICD, Paroxysmal Afib (Eliquis), HTN, DM2, morbid obesity, and AAA. Restart home meds, except as stated above. DVT PPX. Inform PCP of pt's admission to hospital. My note supersedes the residents note.

## 2021-01-26 NOTE — ED PROVIDER NOTE - ATTENDING CONTRIBUTION TO CARE
44 yo with PMH of Afib (on Eliquis), CHF with EF of 45%, AICD, AAA, who presents to the ER for Left sided CP with radiation to the left shoulder today. Pain started around 11 am, intermittent in nature, pressure/pain, associated with SOB. Pt denies any N/V/diarrhea/fatigue/cough/leg swelling/fever/rash/calf pain/back pain. States he also feels some pulsations/pumping sensation to the epigastric area. +occ dizziness. No HA/neck pain/dysuria/numbness/weakness/incontinence. Exam ncat, perrl, eomi, lungs ctab, no flank tenderness, heart s1s2 regular, Abdomen obese, no pulsatile mass seen or palpated however body habitus could prohibit visualization of any AAA, +BS, no masses, no rebound/guarding or tenderness, CHEST no chest wall tenderness, Ext no calf tenderness, no edema, Neuro intact. Check labs, ekg, xray, CTA dissection protocol, reassess    ALL: nkda  PMH as above  Meds: Eliquis, Entresto, Metoprolol  SH: no smoking, no etoh  PMD Juan F Peterson

## 2021-01-26 NOTE — ED ADULT NURSE NOTE - PMH
Atrial fibrillation    Chronic HFrEF (heart failure with reduced ejection fraction)    History of abdominal aortic aneurysm    HTN (hypertension)

## 2021-01-26 NOTE — H&P ADULT - ASSESSMENT
45 yr M with NICM, HFrEF 45%, s/p AICD, Paroxysmal Afib on Eliquis HTN, DM, morbid obesity, Ascending aortic aneurysm presents due to chest pain.     #chest pain, r/u ACS  - trop neg x1, trend CE, serial EKGs, Echo, tele monitoring and Cardio eval  - cardiac cath 2018 no significant CAD  - AAA rupture or dissection ruled by CTA    #NICM, HFrEF 45%, s/p AICD  - c/w lasix, Bb, entresto    #Ascending aortic aneurysm  - currently measures 4.4cm, was 4.7cm on 2019  - no clinical or radiological evidence of rupture or dissection     #Paroxysmal Afib  - c/w Eliquis and BB    #HTN:  - c/w Entresto and BB    #hepatic steatosis on CT: normal LFTs  - likely NGUYEN from obesity, f/u as outpt eith PCP   - counselled about importance of weight loss    #DM:  - glucose > 180, start insulin basal and bolus    #Morbid obesity  - pt counselled for weight loss and life style modifications      #Diet: DASH  #DVT pro: Eliquis  #GI pro: not indicated  #Dispo: tele, from home   45 yr M with NICM, HFrEF 45%, s/p AICD, Paroxysmal Afib on Eliquis HTN, DM, morbid obesity, Ascending aortic aneurysm presents due to chest pain.     #chest pain, r/u ACS  - trop neg x1, trend CE, serial EKGs, Echo, tele monitoring and Cardio eval  - cardiac cath 2018 no significant CAD  - AAA rupture or dissection ruled by CTA    #NICM, HFrEF 45%, s/p AICD  - c/w lasix, Bb, ACEI    #Ascending aortic aneurysm  - currently measures 4.4cm, was 4.7cm on 2019  - no clinical or radiological evidence of rupture or dissection     #Paroxysmal Afib  - c/w Eliquis and BB    #HTN:  - c/w ACEI and BB    #hepatic steatosis on CT: normal LFTs  - likely NGUYEN from obesity, f/u as outpt eith PCP   - counselled about importance of weight loss    #DM:  - glucose > 180, start insulin basal and bolus    #Morbid obesity  - pt counselled for weight loss and life style modifications      #Diet: DASH  #DVT pro: Eliquis  #GI pro: not indicated  #Dispo: tele, from home   45 yr M with NICM, HFrEF 45%, s/p AICD, Paroxysmal Afib on Eliquis HTN, DM, morbid obesity, Ascending aortic aneurysm presents due to chest and epigastric pain.     #chest pain: DDx: likely GI pain (GERD), but need to r/u ACS given his sig cardiac Hx  - trop neg x1, trend CE, send BNP, serial EKGs, Echo, tele monitoring and Cardio eval  - cardiac cath 2018 no significant CAD  - aneurysm rupture or dissection ruled by CTA  - if cardiac work up neg, can give trial of PPI or H2 blocker and send work up of dyspepsia    #NICM, HFrEF 45%, s/p AICD; euvolmic on exam   - resume Lasix 40mg qd, Bb and Entresto   - last Echo 45% 6 months ago    #Ascending aortic aneurysm  - currently measures 4.4cm, was 4.7cm on MRI 2019 and 4.8 on Echo 2020  - no clinical or radiological evidence of abd aneurysm, rupture or dissection of any vessels     #Paroxysmal Afib  - c/w Eliquis and BB    #HTN:  - c/w ACEI and BB    #hepatic steatosis on CT: normal LFTs  - likely NGUYEN from obesity, f/u as outpt eith PCP   - counselled about importance of weight loss    #DM:  - glucose > 180, start insulin basal and bolus    #Morbid obesity  - pt counselled for weight loss and life style modifications    #suspected MADHAVI  - pulm as outpt    #Diet: DASH  #DVT pro: Eliquis  #GI pro: not indicated  #Dispo: tele, from home   45 yr M with NICM, HFrEF 45%, s/p AICD, Paroxysmal Afib on Eliquis HTN, DM, morbid obesity, Ascending aortic aneurysm presents due to chest and epigastric pain.     #chest pain: DDx: likely GI pain (GERD), but need to r/u ACS given his sig cardiac Hx  - trop neg x1, trend CE, send BNP, serial EKGs, Echo, tele monitoring, check A1c and lipid panel, give one dose of ASA, CARD EVAL   - cardiac cath 2018 no significant CAD  - aneurysm rupture or dissection ruled by CTA  - if cardiac work up neg, can give trial of PPI or H2 blocker and send work up of dyspepsia    #NICM, HFrEF 45%, s/p AICD; euvolmic on exam   - resume Lasix 40mg qd, Bb and Entresto   - last Echo 45% 6 months ago    #Ascending aortic aneurysm  - currently measures 4.4cm, was 4.7cm on MRI 2019 and 4.8 on Echo 2020  - no clinical or radiological evidence of abd aneurysm, rupture or dissection of any vessels     #Paroxysmal Afib  - c/w Eliquis and BB    #HTN:  - c/w ACEI and BB    #hepatic steatosis on CT: normal LFTs  - likely NGUYEN from obesity, f/u as outpt eith PCP   - counselled about importance of weight loss    #DM:  - glucose > 180, start insulin basal and bolus  - not taking med at home, check A1C, consider oral agent on discharge    #Morbid obesity  - pt counselled for weight loss and life style modifications    #suspected MADHAVI  - pulm as outpt    #Diet: DASH  #DVT pro: Eliquis  #GI pro: not indicated  #Dispo: tele, from home

## 2021-01-26 NOTE — ED PROVIDER NOTE - OBJECTIVE STATEMENT
44 yo M pmh afib (on Eloquis), CHF (EF 45%), AICD, HTN, ascending aortic aneurysm presenting to the ED c/o intermittent, moderate, sharp, left sided chest pain that radiates to his left arm x 1 day associated with mild SOB. Pt also reports a "pulsing" sensation located in the epigastric abdominal region that began today. Pt Cardiologist Dr. Patel, last echo was 6 months ago (EF 45%), cardiac cath done 2 years ago ("vessels clear" as per pt). Pt denies smoking or alcohol use. Denies fever, chills, nausea, vomiting, back pain, headache, neck pain, dizziness, weakness, numbness/tingling, cough, visual changes, leg swelling  .

## 2021-01-27 ENCOUNTER — TRANSCRIPTION ENCOUNTER (OUTPATIENT)
Age: 46
End: 2021-01-27

## 2021-01-27 VITALS
DIASTOLIC BLOOD PRESSURE: 82 MMHG | OXYGEN SATURATION: 97 % | HEART RATE: 77 BPM | RESPIRATION RATE: 17 BRPM | TEMPERATURE: 98 F | SYSTOLIC BLOOD PRESSURE: 111 MMHG

## 2021-01-27 DIAGNOSIS — I48.91 UNSPECIFIED ATRIAL FIBRILLATION: ICD-10-CM

## 2021-01-27 DIAGNOSIS — R07.9 CHEST PAIN, UNSPECIFIED: ICD-10-CM

## 2021-01-27 DIAGNOSIS — I10 ESSENTIAL (PRIMARY) HYPERTENSION: ICD-10-CM

## 2021-01-27 LAB
A1C WITH ESTIMATED AVERAGE GLUCOSE RESULT: 8.2 % — HIGH (ref 4–5.6)
A1C WITH ESTIMATED AVERAGE GLUCOSE RESULT: 8.3 % — HIGH (ref 4–5.6)
ALBUMIN SERPL ELPH-MCNC: 4 G/DL — SIGNIFICANT CHANGE UP (ref 3.5–5.2)
ALP SERPL-CCNC: 89 U/L — SIGNIFICANT CHANGE UP (ref 30–115)
ALT FLD-CCNC: 30 U/L — SIGNIFICANT CHANGE UP (ref 0–41)
ANION GAP SERPL CALC-SCNC: 9 MMOL/L — SIGNIFICANT CHANGE UP (ref 7–14)
AST SERPL-CCNC: 31 U/L — SIGNIFICANT CHANGE UP (ref 0–41)
BASOPHILS # BLD AUTO: 0.04 K/UL — SIGNIFICANT CHANGE UP (ref 0–0.2)
BASOPHILS NFR BLD AUTO: 0.6 % — SIGNIFICANT CHANGE UP (ref 0–1)
BILIRUB SERPL-MCNC: 0.6 MG/DL — SIGNIFICANT CHANGE UP (ref 0.2–1.2)
BUN SERPL-MCNC: 15 MG/DL — SIGNIFICANT CHANGE UP (ref 10–20)
CALCIUM SERPL-MCNC: 9.1 MG/DL — SIGNIFICANT CHANGE UP (ref 8.5–10.1)
CHLORIDE SERPL-SCNC: 104 MMOL/L — SIGNIFICANT CHANGE UP (ref 98–110)
CHOLEST SERPL-MCNC: 173 MG/DL — SIGNIFICANT CHANGE UP
CK MB CFR SERPL CALC: 1.9 NG/ML — SIGNIFICANT CHANGE UP (ref 0.6–6.3)
CO2 SERPL-SCNC: 27 MMOL/L — SIGNIFICANT CHANGE UP (ref 17–32)
CREAT SERPL-MCNC: 0.8 MG/DL — SIGNIFICANT CHANGE UP (ref 0.7–1.5)
EOSINOPHIL # BLD AUTO: 0.11 K/UL — SIGNIFICANT CHANGE UP (ref 0–0.7)
EOSINOPHIL NFR BLD AUTO: 1.6 % — SIGNIFICANT CHANGE UP (ref 0–8)
ESTIMATED AVERAGE GLUCOSE: 189 MG/DL — HIGH (ref 68–114)
ESTIMATED AVERAGE GLUCOSE: 192 MG/DL — HIGH (ref 68–114)
GLUCOSE BLDC GLUCOMTR-MCNC: 107 MG/DL — HIGH (ref 70–99)
GLUCOSE BLDC GLUCOMTR-MCNC: 137 MG/DL — HIGH (ref 70–99)
GLUCOSE SERPL-MCNC: 111 MG/DL — HIGH (ref 70–99)
HCT VFR BLD CALC: 44.1 % — SIGNIFICANT CHANGE UP (ref 42–52)
HDLC SERPL-MCNC: 34 MG/DL — LOW
HGB BLD-MCNC: 14.2 G/DL — SIGNIFICANT CHANGE UP (ref 14–18)
IMM GRANULOCYTES NFR BLD AUTO: 0.3 % — SIGNIFICANT CHANGE UP (ref 0.1–0.3)
LIPID PNL WITH DIRECT LDL SERPL: 122 MG/DL — HIGH
LYMPHOCYTES # BLD AUTO: 2.7 K/UL — SIGNIFICANT CHANGE UP (ref 1.2–3.4)
LYMPHOCYTES # BLD AUTO: 38.1 % — SIGNIFICANT CHANGE UP (ref 20.5–51.1)
MCHC RBC-ENTMCNC: 28.1 PG — SIGNIFICANT CHANGE UP (ref 27–31)
MCHC RBC-ENTMCNC: 32.2 G/DL — SIGNIFICANT CHANGE UP (ref 32–37)
MCV RBC AUTO: 87.3 FL — SIGNIFICANT CHANGE UP (ref 80–94)
MONOCYTES # BLD AUTO: 0.66 K/UL — HIGH (ref 0.1–0.6)
MONOCYTES NFR BLD AUTO: 9.3 % — SIGNIFICANT CHANGE UP (ref 1.7–9.3)
NEUTROPHILS # BLD AUTO: 3.55 K/UL — SIGNIFICANT CHANGE UP (ref 1.4–6.5)
NEUTROPHILS NFR BLD AUTO: 50.1 % — SIGNIFICANT CHANGE UP (ref 42.2–75.2)
NON HDL CHOLESTEROL: 139 MG/DL — HIGH
NRBC # BLD: 0 /100 WBCS — SIGNIFICANT CHANGE UP (ref 0–0)
NT-PROBNP SERPL-SCNC: 27 PG/ML — SIGNIFICANT CHANGE UP (ref 0–300)
PLATELET # BLD AUTO: 184 K/UL — SIGNIFICANT CHANGE UP (ref 130–400)
POTASSIUM SERPL-MCNC: 4.4 MMOL/L — SIGNIFICANT CHANGE UP (ref 3.5–5)
POTASSIUM SERPL-SCNC: 4.4 MMOL/L — SIGNIFICANT CHANGE UP (ref 3.5–5)
PROT SERPL-MCNC: 6.5 G/DL — SIGNIFICANT CHANGE UP (ref 6–8)
RBC # BLD: 5.05 M/UL — SIGNIFICANT CHANGE UP (ref 4.7–6.1)
RBC # FLD: 12.8 % — SIGNIFICANT CHANGE UP (ref 11.5–14.5)
SODIUM SERPL-SCNC: 140 MMOL/L — SIGNIFICANT CHANGE UP (ref 135–146)
TRIGL SERPL-MCNC: 169 MG/DL — HIGH
TROPONIN T SERPL-MCNC: <0.01 NG/ML — SIGNIFICANT CHANGE UP
TROPONIN T SERPL-MCNC: <0.01 NG/ML — SIGNIFICANT CHANGE UP
WBC # BLD: 7.08 K/UL — SIGNIFICANT CHANGE UP (ref 4.8–10.8)
WBC # FLD AUTO: 7.08 K/UL — SIGNIFICANT CHANGE UP (ref 4.8–10.8)

## 2021-01-27 PROCEDURE — 93010 ELECTROCARDIOGRAM REPORT: CPT

## 2021-01-27 PROCEDURE — 99223 1ST HOSP IP/OBS HIGH 75: CPT | Mod: AI

## 2021-01-27 RX ORDER — SACUBITRIL AND VALSARTAN 24; 26 MG/1; MG/1
1 TABLET, FILM COATED ORAL
Qty: 60 | Refills: 0
Start: 2021-01-27 | End: 2021-02-25

## 2021-01-27 RX ORDER — ATORVASTATIN CALCIUM 80 MG/1
1 TABLET, FILM COATED ORAL
Qty: 30 | Refills: 0
Start: 2021-01-27 | End: 2021-02-25

## 2021-01-27 RX ORDER — SACUBITRIL AND VALSARTAN 24; 26 MG/1; MG/1
1 TABLET, FILM COATED ORAL
Qty: 0 | Refills: 0 | DISCHARGE

## 2021-01-27 RX ORDER — FUROSEMIDE 40 MG
1 TABLET ORAL
Qty: 60 | Refills: 0
Start: 2021-01-27 | End: 2021-02-25

## 2021-01-27 RX ORDER — APIXABAN 2.5 MG/1
1 TABLET, FILM COATED ORAL
Qty: 60 | Refills: 0
Start: 2021-01-27 | End: 2021-02-25

## 2021-01-27 RX ORDER — METOPROLOL TARTRATE 50 MG
1 TABLET ORAL
Qty: 0 | Refills: 0 | DISCHARGE

## 2021-01-27 RX ORDER — ATORVASTATIN CALCIUM 80 MG/1
40 TABLET, FILM COATED ORAL AT BEDTIME
Refills: 0 | Status: DISCONTINUED | OUTPATIENT
Start: 2021-01-27 | End: 2021-01-27

## 2021-01-27 RX ORDER — METOPROLOL TARTRATE 50 MG
1 TABLET ORAL
Qty: 60 | Refills: 0
Start: 2021-01-27 | End: 2021-02-25

## 2021-01-27 RX ORDER — PANTOPRAZOLE SODIUM 20 MG/1
1 TABLET, DELAYED RELEASE ORAL
Qty: 60 | Refills: 0
Start: 2021-01-27 | End: 2021-03-27

## 2021-01-27 RX ADMIN — Medication 50 MILLIGRAM(S): at 06:32

## 2021-01-27 RX ADMIN — Medication 40 MILLIGRAM(S): at 06:32

## 2021-01-27 RX ADMIN — Medication 81 MILLIGRAM(S): at 00:17

## 2021-01-27 RX ADMIN — SACUBITRIL AND VALSARTAN 1 TABLET(S): 24; 26 TABLET, FILM COATED ORAL at 06:32

## 2021-01-27 RX ADMIN — APIXABAN 5 MILLIGRAM(S): 2.5 TABLET, FILM COATED ORAL at 06:32

## 2021-01-27 NOTE — DISCHARGE NOTE NURSING/CASE MANAGEMENT/SOCIAL WORK - PATIENT PORTAL LINK FT
You can access the FollowMyHealth Patient Portal offered by [facility] by registering at the following website: [email or web address]. By joining [employer]’s FollowMyHealth portal, you will also be able to view your health information using other applications (apps) compatible with our system.

## 2021-01-27 NOTE — DISCHARGE NOTE PROVIDER - NSDCMRMEDTOKEN_GEN_ALL_CORE_FT
atorvastatin 40 mg oral tablet: 1 tab(s) orally once a day (at bedtime)   Eliquis 5 mg oral tablet: 1 tab(s) orally 2 times a day   Entresto 49 mg-51 mg oral tablet: 1 tab(s) orally 2 times a day  furosemide 40 mg oral tablet: 1 tab(s) orally 2 times a day PRN  Metoprolol Tartrate 50 mg oral tablet: 1 tab(s) orally 2 times a day  pantoprazole 40 mg oral delayed release tablet: 1 tab(s) orally once a day

## 2021-01-27 NOTE — DISCHARGE NOTE PROVIDER - NSDCFUSCHEDAPPT_GEN_ALL_CORE_FT
BASHIR ALCOCER ; 02/03/2021 ; [facility]  ABSHIR ALCOCER ; 02/03/2021 ; [facility] [address] [place] [address]

## 2021-01-27 NOTE — DISCHARGE NOTE PROVIDER - CARE PROVIDER_API CALL
AUGUSTO GONSALES  [id number]  [address] [place] [address]  Phone: [phone]  Fax: ()-  Follow Up Time: 2 weeks   AUGUSTO GONSALES  [id number]  [address] [place] [address]  Phone: [phone]  Fax: ()-  Follow Up Time: 2 weeks    Avelino Francisco  Cardiovascular Disease  [address] [place] [address]  Phone: [phone]  Fax: [phone]  Established Patient  Follow Up Time: 2 weeks   AUGUSTO GONSALES  [id number]  [address] [place] [address]  Phone: [phone]  Fax: ()-  Follow Up Time: 2 weeks    Avelino Francisco  Cardiovascular Disease  [address] [place] [address]  Phone: [phone]  Fax: [phone]  Established Patient  Follow Up Time: 1-3 days

## 2021-01-27 NOTE — DISCHARGE NOTE PROVIDER - NSDCCPCAREPLAN_GEN_ALL_CORE_FT
PRINCIPAL DISCHARGE DIAGNOSIS  Diagnosis: Chest pain  Assessment and Plan of Treatment: you presented for chest pain that is not secondary to your heart. Workup was negative for ischemia. it is likely secondary to reflux. you will be dicharged on ppi for 8 weeks. take the medications as prescribed and follow up with your primary care doctor.       PRINCIPAL DISCHARGE DIAGNOSIS  Diagnosis: Chest pain  Assessment and Plan of Treatment: you presented for chest pain that is not secondary to your heart. Workup was negative for ischemia. it is likely secondary to reflux. you will be dicharged on ppi for 8 weeks. take the medications as prescribed and follow up with your primary care doctor and cardiologist within 2 weeks.

## 2021-01-27 NOTE — DISCHARGE NOTE PROVIDER - PROVIDER TOKENS
PROVIDER:[TOKEN:[[id number]],FOLLOWUP:[2 weeks]] PROVIDER:[TOKEN:[[id number]],FOLLOWUP:[2 weeks]],PROVIDER:[TOKEN:[[id number]],FOLLOWUP:[2 weeks],ESTABLISHEDPATIENT:[T]] PROVIDER:[TOKEN:[[id number]],FOLLOWUP:[2 weeks]],PROVIDER:[TOKEN:[[id number]],FOLLOWUP:[1-3 days],ESTABLISHEDPATIENT:[T]]

## 2021-01-27 NOTE — DISCHARGE NOTE PROVIDER - HOSPITAL COURSE
45 yr M with NICM, HFrEF 45%, s/p AICD, Paroxysmal Afib on Eliquis HTN, DM, morbid obesity, Ascending aortic aneurysm presents due to chest pain.   ECG and troponin negative for ischemic changes.  Patient had a  cardiac cath in 2018 that was normal.  he has a non ischemic cardiomyopathy.  He was evaluated by cardiology who recommended no further treatment or investigations.  His chest pain is likely secondary to GERD and he will be discharged on PPI for 8 weeks.  He will follow up with his PMD in the clinics.     45 yr M with NICM, HFrEF 45%, s/p AICD, Paroxysmal Afib on Eliquis HTN, DM, morbid obesity, Ascending aortic aneurysm presents due to chest pain.   ECG and troponin negative for ischemic changes.  Patient had a  cardiac cath in 2018 that was normal.  he has a non ischemic cardiomyopathy.  He was evaluated by cardiology who recommended no further treatment or investigations.  His chest pain is likely secondary to GERD and he will be discharged on PPI for 8 weeks.  He will follow up with his PMD in the clinics.    Attending Note:  Patient seen and examined independently. I personally had a face-to-face encounter with the patient, examined the patient myself and reviewed the plan of care with the housestaff. Agree with resident's note but my note supersedes that of the resident in the matters hereby listed.   Patient with cardiac history as above, p/w chest pain (described as sometimes burning, starts in epigastrium and radiates in chest. Sometimes   felt in left arm.) Only since yesterday. No relation to exercise.   Trops, EKG, CTA negative.   Cardio [place]rne saw the patient and advised clinic f/u for outpatient stress testing.   STable for d/c on his home meds.  Patient says he has his arrangements for getting his meds. Does not need any financial assistance.  Agrees to be compliant with his meds now.   D/c to home.   F/u Cardio/PCP within 2 weeks.

## 2021-01-27 NOTE — PROVIDER CONTACT NOTE (OTHER) - SITUATION
MD made aware that patient is no longer compliant with maintaining cardiac monitoring despite education

## 2021-01-28 LAB
SARS-COV-2 IGG SERPL QL IA: NEGATIVE — SIGNIFICANT CHANGE UP
SARS-COV-2 IGM SERPL IA-ACNC: 0.07 INDEX — SIGNIFICANT CHANGE UP

## 2021-02-03 ENCOUNTER — APPOINTMENT (OUTPATIENT)
Dept: CARDIOLOGY | Facility: CLINIC | Age: 46
End: 2021-02-03

## 2021-02-03 DIAGNOSIS — Z79.01 LONG TERM (CURRENT) USE OF ANTICOAGULANTS: ICD-10-CM

## 2021-02-03 DIAGNOSIS — Z95.810 PRESENCE OF AUTOMATIC (IMPLANTABLE) CARDIAC DEFIBRILLATOR: ICD-10-CM

## 2021-02-03 DIAGNOSIS — I42.8 OTHER CARDIOMYOPATHIES: ICD-10-CM

## 2021-02-03 DIAGNOSIS — E66.01 MORBID (SEVERE) OBESITY DUE TO EXCESS CALORIES: ICD-10-CM

## 2021-02-03 DIAGNOSIS — Z79.84 LONG TERM (CURRENT) USE OF ORAL HYPOGLYCEMIC DRUGS: ICD-10-CM

## 2021-02-03 DIAGNOSIS — K21.9 GASTRO-ESOPHAGEAL REFLUX DISEASE WITHOUT ESOPHAGITIS: ICD-10-CM

## 2021-02-03 DIAGNOSIS — I48.0 PAROXYSMAL ATRIAL FIBRILLATION: ICD-10-CM

## 2021-02-03 DIAGNOSIS — G47.33 OBSTRUCTIVE SLEEP APNEA (ADULT) (PEDIATRIC): ICD-10-CM

## 2021-02-03 DIAGNOSIS — I11.0 HYPERTENSIVE HEART DISEASE WITH HEART FAILURE: ICD-10-CM

## 2021-02-03 DIAGNOSIS — E11.65 TYPE 2 DIABETES MELLITUS WITH HYPERGLYCEMIA: ICD-10-CM

## 2021-02-03 DIAGNOSIS — R07.9 CHEST PAIN, UNSPECIFIED: ICD-10-CM

## 2021-02-03 DIAGNOSIS — I50.22 CHRONIC SYSTOLIC (CONGESTIVE) HEART FAILURE: ICD-10-CM

## 2021-02-03 DIAGNOSIS — I71.4 ABDOMINAL AORTIC ANEURYSM, WITHOUT RUPTURE: ICD-10-CM

## 2021-02-03 DIAGNOSIS — Z91.14 PATIENT'S OTHER NONCOMPLIANCE WITH MEDICATION REGIMEN: ICD-10-CM

## 2021-02-06 NOTE — DISCHARGE NOTE NURSING/CASE MANAGEMENT/SOCIAL WORK - NSDCPEELIQUISFU_GEN_ALL_CORE
Go for blood tests as directed. Your doctor will do lab tests at regular visits to monitor the effects of this medicine. Please follow up with your doctor and keep your health care provider appointments.
No

## 2021-02-18 ENCOUNTER — FORM ENCOUNTER (OUTPATIENT)
Age: 46
End: 2021-02-18

## 2021-03-01 ENCOUNTER — APPOINTMENT (OUTPATIENT)
Dept: CARDIOLOGY | Facility: CLINIC | Age: 46
End: 2021-03-01

## 2021-03-04 NOTE — CONSULT NOTE ADULT - SUBJECTIVE AND OBJECTIVE BOX
Patient is a 45y old  Male who presents with a chief complaint of chest pain (27 Jan 2021 11:28)      HPI:  pt denies fever, head45 yr M with NICM, HFrEF 45%, s/p AICD, Paroxysmal Afib on Eliquis HTN, DM, morbid obesity, Ascending aortic aneurysm presents due to chest pain.     the pt was doing well till 11am yesterday     he felt left sided chest pain, felt like pressure, radiated to left shoulder, lasted for 10 min, associated with intermittent SOB, palpitation and epigastric pain and pulsating sensation over the upper abdomen,  the pain resolved without intervention, he had 2 episode of similar pain before coming to ER, lasted for few min and resolved with no intervention.  pt is compliant with his meds except the liptior and DM med he doesn't take them, he was discharged on lasix 40mg BID last admission,.  via [employer] 2 months ago, he takes lasix 2-3x/week as he never was SOB and had no legs swelling.     pt also has been having heartburn after eating since the past 2 weeks,   pt denies fever, headache, syncope, seizure, abd pain, N/V, diarrhea, bloody BM or melena,   pt has no insurance, [place],    In ER; VS wnl, EKG sinus Rhythm,  trop neg x1, CT angio stable AAA, no dissection, CXR and CT no significant pulm congestion, pt admitted for further eval  (26 Jan 2021 21:33)      PAST MEDICAL & SURGICAL HISTORY:  History of abdominal aortic aneurysm    Atrial fibrillation    Chronic HFrEF (heart failure with reduced ejection fraction)    HTN (hypertension)    S/P implantation of automatic cardioverter/defibrillator (AICD)        PREVIOUS DIAGNOSTIC TESTING:      ECHO  FINDINGS:Summary:   1. Left ventricular ejection fraction, by visual estimation, is 40 to 45%.   2. Mildly decreased global left ventricular systolic function.   3. Mildly increased LV wall thickness.   4. Mild aortic regurgitation.   5. Dilatation of the ascending aorta.   6. Asc aorta 4.8 cm.   7. No evidence of aortic stenosis.      CATHETERIZATION  FINDINGS: no significan cad    MEDICATIONS  (STANDING):  apixaban 5 milliGRAM(s) Oral every 12 hours  atorvastatin 40 milliGRAM(s) Oral at bedtime  dextrose 40% Gel 15 Gram(s) Oral once  dextrose 5%. 1000 milliLiter(s) (50 mL/Hr) IV Continuous <Continuous>  dextrose 5%. 1000 milliLiter(s) (100 mL/Hr) IV Continuous <Continuous>  dextrose 50% Injectable 25 Gram(s) IV Push once  dextrose 50% Injectable 12.5 Gram(s) IV Push once  dextrose 50% Injectable 25 Gram(s) IV Push once  furosemide    Tablet 40 milliGRAM(s) Oral daily  glucagon  Injectable 1 milliGRAM(s) IntraMuscular once  insulin glargine Injectable (LANTUS) 15 Unit(s) SubCutaneous at bedtime  insulin lispro Injectable (ADMELOG) 5 Unit(s) SubCutaneous three times a day before meals  metoprolol tartrate 50 milliGRAM(s) Oral two times a day  sacubitril 49 mG/valsartan 51 mG 1 Tablet(s) Oral two times a day    MEDICATIONS  (PRN):      FAMILY HISTORY:  Family history of heart disease (Father)    Family history of diabetes mellitus (Mother)        SOCIAL HISTORY:  CIGARETTES: none  ALCOHOL: none  DRUGS: none                      REVIEW OF SYSTEMS:  CONSTITUTIONAL: No distress, Looks stable  NECK: No pain   RESPIRATORY: No cough, wheezing, shortness of breath  CARDIOVASCULAR: (+) chest pain, no SOB, palpitations, leg swelling  GASTROINTESTINAL: No abdominal or epigastric pain. No nausea, vomiting, or hematemesis;  No melena.  NEUROLOGICAL: No dizziness, headaches, memory loss, loss of strength  SKIN: No itching, burning, rashes, or lesions   ENDOCRINE: No heat or cold intolerance  MUSCULOSKELETAL: No joint pain, No  swelling; No muscle pain  PSYCHIATRIC: No depression, anxiety, mood swings, or difficulty sleeping  ALLERGY: No hives, itching, rash          Vital Signs Last 24 Hrs  T(C): 36.8 (27 Jan 2021 15:52), Max: 36.8 (27 Jan 2021 15:52)  T(F): 98.2 (27 Jan 2021 15:52), Max: 98.2 (27 Jan 2021 15:52)  HR: 77 (27 Jan 2021 15:52) (72 - 81)  BP: 111/82 (27 Jan 2021 15:52) (111/82 - 118/78)  BP(mean): --  RR: 17 (27 Jan 2021 15:52) (17 - 17)  SpO2: 97% (27 Jan 2021 15:52) (97% - 100%)                      PHYSICAL EXAM:  GENERAL: No distress, well developed  HEAD:  Atraumatic, Normocephalic  NECK: Supple, No JVD, No Bruit of either carotid arteries  NERVOUS SYSTEM:  Alert, Awake, Oriented to time, place, person; Normal memory and speech; Normal motor Strength 5/5 B/L upper and lower extremities  CHEST/LUNG: Normal air entry to lung base bilaterally; No wheeze, crackle, rales, rhonchi  HEART: Regular heart beat, S1, A2, P2, No S3, No S4, No gallop, No murmur  ABDOMEN: Soft, Non tender, Non distended; Bowel sounds present  EXTREMITIES:  2+ Peripheral Pulses, No clubbing, No edema  SKIN: No rashes or lesions    TELEMETRY:  nsr  ECG:Diagnosis Line Sinus rhythm with 1st degree A-V block  Otherwise normal ECG      I&O's Detail      LABS:                        14.2   7.08  )-----------( 184      ( 27 Jan 2021 06:50 )             44.1     01-27    140  |  104  |  15  ----------------------------<  111<H>  4.4   |  27  |  0.8    Ca    9.1      27 Jan 2021 06:50  Mg     2.0     01-26    TPro  6.5  /  Alb  4.0  /  TBili  0.6  /  DBili  x   /  AST  31  /  ALT  30  /  AlkPhos  89  01-27    CARDIAC MARKERS ( 27 Jan 2021 06:50 )  x     / <0.01 ng/mL / x     / x     / x      CARDIAC MARKERS ( 27 Jan 2021 00:49 )  x     / <0.01 ng/mL / x     / x     / 1.9 ng/mL  CARDIAC MARKERS ( 26 Jan 2021 20:02 )  x     / <0.01 ng/mL / x     / x     / x          PT/INR - ( 26 Jan 2021 20:02 )   PT: 12.30 sec;   INR: 1.07 ratio         PTT - ( 26 Jan 2021 20:02 )  PTT:28.7 sec    I&O's Summary      RADIOLOGY & ADDITIONAL STUDIES: Final Size Statement: The size of the lesion after curettage was

## 2021-06-01 ENCOUNTER — APPOINTMENT (OUTPATIENT)
Dept: CARDIOLOGY | Facility: CLINIC | Age: 46
End: 2021-06-01

## 2021-08-04 ENCOUNTER — NON-APPOINTMENT (OUTPATIENT)
Age: 46
End: 2021-08-04

## 2021-08-04 ENCOUNTER — APPOINTMENT (OUTPATIENT)
Dept: CARDIOLOGY | Facility: CLINIC | Age: 46
End: 2021-08-04
Payer: MEDICAID

## 2021-08-04 PROCEDURE — 93295 DEV INTERROG REMOTE 1/2/MLT: CPT

## 2021-08-04 PROCEDURE — 93296 REM INTERROG EVL PM/IDS: CPT

## 2021-09-01 ENCOUNTER — APPOINTMENT (OUTPATIENT)
Dept: CARDIOLOGY | Facility: CLINIC | Age: 46
End: 2021-09-01

## 2021-10-20 ENCOUNTER — APPOINTMENT (OUTPATIENT)
Dept: CARDIOLOGY | Facility: CLINIC | Age: 46
End: 2021-10-20

## 2021-11-04 ENCOUNTER — APPOINTMENT (OUTPATIENT)
Dept: CARDIOLOGY | Facility: CLINIC | Age: 46
End: 2021-11-04

## 2021-11-17 ENCOUNTER — FORM ENCOUNTER (OUTPATIENT)
Age: 46
End: 2021-11-17

## 2021-11-29 ENCOUNTER — EMERGENCY (EMERGENCY)
Facility: HOSPITAL | Age: 46
LOS: 0 days | Discharge: HOME | End: 2021-11-29
Attending: EMERGENCY MEDICINE | Admitting: EMERGENCY MEDICINE
Payer: MEDICAID

## 2021-11-29 VITALS
DIASTOLIC BLOOD PRESSURE: 81 MMHG | TEMPERATURE: 97 F | RESPIRATION RATE: 22 BRPM | HEART RATE: 114 BPM | SYSTOLIC BLOOD PRESSURE: 133 MMHG | WEIGHT: 275.58 LBS | HEIGHT: 71 IN | OXYGEN SATURATION: 98 %

## 2021-11-29 VITALS
DIASTOLIC BLOOD PRESSURE: 81 MMHG | SYSTOLIC BLOOD PRESSURE: 124 MMHG | RESPIRATION RATE: 18 BRPM | HEART RATE: 78 BPM | OXYGEN SATURATION: 98 %

## 2021-11-29 DIAGNOSIS — R06.02 SHORTNESS OF BREATH: ICD-10-CM

## 2021-11-29 DIAGNOSIS — R07.2 PRECORDIAL PAIN: ICD-10-CM

## 2021-11-29 DIAGNOSIS — R55 SYNCOPE AND COLLAPSE: ICD-10-CM

## 2021-11-29 DIAGNOSIS — Z95.810 PRESENCE OF AUTOMATIC (IMPLANTABLE) CARDIAC DEFIBRILLATOR: Chronic | ICD-10-CM

## 2021-11-29 DIAGNOSIS — E78.5 HYPERLIPIDEMIA, UNSPECIFIED: ICD-10-CM

## 2021-11-29 DIAGNOSIS — I48.91 UNSPECIFIED ATRIAL FIBRILLATION: ICD-10-CM

## 2021-11-29 DIAGNOSIS — Z95.810 PRESENCE OF AUTOMATIC (IMPLANTABLE) CARDIAC DEFIBRILLATOR: ICD-10-CM

## 2021-11-29 DIAGNOSIS — R10.13 EPIGASTRIC PAIN: ICD-10-CM

## 2021-11-29 DIAGNOSIS — R42 DIZZINESS AND GIDDINESS: ICD-10-CM

## 2021-11-29 DIAGNOSIS — I10 ESSENTIAL (PRIMARY) HYPERTENSION: ICD-10-CM

## 2021-11-29 LAB
ALBUMIN SERPL ELPH-MCNC: 4.4 G/DL — SIGNIFICANT CHANGE UP (ref 3.5–5.2)
ALP SERPL-CCNC: 110 U/L — SIGNIFICANT CHANGE UP (ref 30–115)
ALT FLD-CCNC: 45 U/L — HIGH (ref 0–41)
ANION GAP SERPL CALC-SCNC: 14 MMOL/L — SIGNIFICANT CHANGE UP (ref 7–14)
AST SERPL-CCNC: 37 U/L — SIGNIFICANT CHANGE UP (ref 0–41)
BASOPHILS # BLD AUTO: 0.05 K/UL — SIGNIFICANT CHANGE UP (ref 0–0.2)
BASOPHILS NFR BLD AUTO: 0.6 % — SIGNIFICANT CHANGE UP (ref 0–1)
BILIRUB SERPL-MCNC: 0.5 MG/DL — SIGNIFICANT CHANGE UP (ref 0.2–1.2)
BUN SERPL-MCNC: 12 MG/DL — SIGNIFICANT CHANGE UP (ref 10–20)
CALCIUM SERPL-MCNC: 9.6 MG/DL — SIGNIFICANT CHANGE UP (ref 8.5–10.1)
CHLORIDE SERPL-SCNC: 103 MMOL/L — SIGNIFICANT CHANGE UP (ref 98–110)
CO2 SERPL-SCNC: 21 MMOL/L — SIGNIFICANT CHANGE UP (ref 17–32)
CREAT SERPL-MCNC: 1 MG/DL — SIGNIFICANT CHANGE UP (ref 0.7–1.5)
EOSINOPHIL # BLD AUTO: 0.08 K/UL — SIGNIFICANT CHANGE UP (ref 0–0.7)
EOSINOPHIL NFR BLD AUTO: 1 % — SIGNIFICANT CHANGE UP (ref 0–8)
GLUCOSE SERPL-MCNC: 176 MG/DL — HIGH (ref 70–99)
HCT VFR BLD CALC: 48.4 % — SIGNIFICANT CHANGE UP (ref 42–52)
HGB BLD-MCNC: 15.8 G/DL — SIGNIFICANT CHANGE UP (ref 14–18)
IMM GRANULOCYTES NFR BLD AUTO: 0.2 % — SIGNIFICANT CHANGE UP (ref 0.1–0.3)
LYMPHOCYTES # BLD AUTO: 2.77 K/UL — SIGNIFICANT CHANGE UP (ref 1.2–3.4)
LYMPHOCYTES # BLD AUTO: 34.3 % — SIGNIFICANT CHANGE UP (ref 20.5–51.1)
MCHC RBC-ENTMCNC: 27.8 PG — SIGNIFICANT CHANGE UP (ref 27–31)
MCHC RBC-ENTMCNC: 32.6 G/DL — SIGNIFICANT CHANGE UP (ref 32–37)
MCV RBC AUTO: 85.1 FL — SIGNIFICANT CHANGE UP (ref 80–94)
MONOCYTES # BLD AUTO: 0.62 K/UL — HIGH (ref 0.1–0.6)
MONOCYTES NFR BLD AUTO: 7.7 % — SIGNIFICANT CHANGE UP (ref 1.7–9.3)
NEUTROPHILS # BLD AUTO: 4.54 K/UL — SIGNIFICANT CHANGE UP (ref 1.4–6.5)
NEUTROPHILS NFR BLD AUTO: 56.2 % — SIGNIFICANT CHANGE UP (ref 42.2–75.2)
NRBC # BLD: 0 /100 WBCS — SIGNIFICANT CHANGE UP (ref 0–0)
NT-PROBNP SERPL-SCNC: 45 PG/ML — SIGNIFICANT CHANGE UP (ref 0–300)
PLATELET # BLD AUTO: 238 K/UL — SIGNIFICANT CHANGE UP (ref 130–400)
POTASSIUM SERPL-MCNC: 4.7 MMOL/L — SIGNIFICANT CHANGE UP (ref 3.5–5)
POTASSIUM SERPL-SCNC: 4.7 MMOL/L — SIGNIFICANT CHANGE UP (ref 3.5–5)
PROT SERPL-MCNC: 7.5 G/DL — SIGNIFICANT CHANGE UP (ref 6–8)
RBC # BLD: 5.69 M/UL — SIGNIFICANT CHANGE UP (ref 4.7–6.1)
RBC # FLD: 12.5 % — SIGNIFICANT CHANGE UP (ref 11.5–14.5)
SODIUM SERPL-SCNC: 138 MMOL/L — SIGNIFICANT CHANGE UP (ref 135–146)
TROPONIN T SERPL-MCNC: <0.01 NG/ML — SIGNIFICANT CHANGE UP
WBC # BLD: 8.08 K/UL — SIGNIFICANT CHANGE UP (ref 4.8–10.8)
WBC # FLD AUTO: 8.08 K/UL — SIGNIFICANT CHANGE UP (ref 4.8–10.8)

## 2021-11-29 PROCEDURE — 99285 EMERGENCY DEPT VISIT HI MDM: CPT

## 2021-11-29 PROCEDURE — 93010 ELECTROCARDIOGRAM REPORT: CPT

## 2021-11-29 PROCEDURE — 71275 CT ANGIOGRAPHY CHEST: CPT | Mod: 26,MA

## 2021-11-29 PROCEDURE — 71046 X-RAY EXAM CHEST 2 VIEWS: CPT | Mod: 26

## 2021-11-29 NOTE — ED PROVIDER NOTE - CLINICAL SUMMARY MEDICAL DECISION MAKING FREE TEXT BOX
45 Y/O M PMHX AS DOCUMENTED C/O CP AND SOB WITH ASSOCIATED EPIGASTRIC PAIN. ALL DIAGNOSTIC TESTING REVIEWED. CT WITH NO CHANGE IN THORACIC AORTIC ANEURYSM. CASE D/W CARDIOLOGY. PT TO FOLLOW UP WITH PRIVATE CARDIOLOGIST OR CARDS CLINIC. PT GIVEN STRICT RETURN INSTRUCTIONS.

## 2021-11-29 NOTE — ED PROVIDER NOTE - NS ED ROS FT
Constitutional:  See HPI.  Eyes:  No visual changes, eye pain or discharge.  ENMT:  Nsee hpi  Respiratory:  No cough or respiratory distress. No hemoptysis. No history of asthma or RAD.  GI:  No nausea, vomiting, diarrhea or abdominal pain.  :  No dysuria, frequency or burning.  MS:  No myalgia, muscle weakness, joint pain   Neuro:  No headache or weakness.  No LOC.  Skin:  No skin rash.   Endocrine: No history of thyroid disease or diabetes.  Except as documented in the HPI,  all other systems are negative.

## 2021-11-29 NOTE — ED PROVIDER NOTE - CARE PROVIDER_API CALL
Avelino Francisco  CARDIOVASCULAR DISEASE  [address] [facility] [address]  [place] [address]  Phone: [phone]  Fax: [phone]  Follow Up Time: Routine

## 2021-11-29 NOTE — ED PROVIDER NOTE - OBJECTIVE STATEMENT
45 Y/O M HTN, DLD, CARDIOMYOPATHY, S/P AICD, AFIB ON ELIQUIS, AAA C/O CP AND SOB SINCE LAST NIGHT. CP IS SUBSTERNAL AND IS NOT EXERTIONAL OR PLEURITIC. + ASSOCIATED SOB SINCE LAST NIGHT. CP AND SOB AND INTERMITTENT AND LAST SEVERAL MINUTES. NO COUGH, FEVER, CHILLS. + [place] 2 MONTH[place] AGO. NO LEG EDEMA. PT ALSO C/O INTERMITTENT EPIGASTRIC PAIN X 5-7 DAYS WITH ASSOCIATED L FLANK PAIN. ABD PAIN ASSOCIATED WITH DIZZINESS AND NEAR SYNCOPE. NO SYNCOPE. NORMAL URINATION AND BMS. PT REPORTS HE HAD A NORMAL CARDIAC CATH 3 YEARS AGO. NO CP CURRENTLY.

## 2021-11-29 NOTE — ED ADULT TRIAGE NOTE - CHIEF COMPLAINT QUOTE
Patient complaining of shortness of breath and left flank pain for the past two days. [place] No fevers, nausea, vomiting or diarrhea

## 2021-11-29 NOTE — ED PROVIDER NOTE - PHYSICAL EXAMINATION
CONSTITUTIONAL: Well-appearing; well-nourished; in no apparent distress.   HEAD: Normocephalic; atraumatic.   EYES: PERRL; EOM intact. Conjunctiva normal B/L.   ENT: Normal pharynx with no tonsillar hypertrophy. MMM.  NECK: Supple; non-tender; no cervical lymphadenopathy.   CHEST: Normal chest excursion with respiration. + L chest AICD.  CARDIOVASCULAR: Normal S1, S2; no murmurs, rubs, or gallops.   RESPIRATORY: Normal chest excursion with respiration; breath sounds clear and equal bilaterally; no wheezes, rhonchi, or rales.  GI/: Normal bowel sounds; non-distended. + epigastric tenderness. No rebound or guarding. No pulsatile masses.   BACK: No evidence of trauma or deformity. Non-tender to palpation. No CVA tenderness.   EXT: Normal ROM in all four extremities; non-tender to palpation; distal pulses are normal. No leg edema B/L.   SKIN: Normal for age and race; warm; dry; good turgor.  NEURO: A & O x 4; CN 2-12 intact. Grossly unremarkable.

## 2022-02-04 ENCOUNTER — APPOINTMENT (OUTPATIENT)
Dept: CARDIOLOGY | Facility: CLINIC | Age: 47
End: 2022-02-04

## 2022-03-11 NOTE — ED PROVIDER NOTE - TOBACCO USE
Never smoker Methotrexate Pregnancy And Lactation Text: This medication is Pregnancy Category X and is known to cause fetal harm. This medication is excreted in breast milk.

## 2022-04-12 NOTE — H&P ADULT - REASON FOR ADMISSION
CP r/o ACS Valtrex Counseling: I discussed with the patient the risks of valacyclovir including but not limited to kidney damage, nausea, vomiting and severe allergy.  The patient understands that if the infection seems to be worsening or is not improving, they are to call.
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
## 2022-08-10 ENCOUNTER — EMERGENCY (EMERGENCY)
Facility: HOSPITAL | Age: 47
LOS: 0 days | Discharge: LEFT BEFORE DISCHARGE | End: 2022-08-10
Attending: EMERGENCY MEDICINE | Admitting: EMERGENCY MEDICINE

## 2022-08-10 VITALS
OXYGEN SATURATION: 99 % | TEMPERATURE: 98 F | SYSTOLIC BLOOD PRESSURE: 117 MMHG | WEIGHT: 285.06 LBS | DIASTOLIC BLOOD PRESSURE: 73 MMHG | HEART RATE: 75 BPM | HEIGHT: 71 IN | RESPIRATION RATE: 20 BRPM

## 2022-08-10 VITALS
TEMPERATURE: 97 F | RESPIRATION RATE: 18 BRPM | SYSTOLIC BLOOD PRESSURE: 102 MMHG | OXYGEN SATURATION: 98 % | DIASTOLIC BLOOD PRESSURE: 56 MMHG | HEART RATE: 73 BPM

## 2022-08-10 DIAGNOSIS — I50.9 HEART FAILURE, UNSPECIFIED: ICD-10-CM

## 2022-08-10 DIAGNOSIS — Z20.822 CONTACT WITH AND (SUSPECTED) EXPOSURE TO COVID-19: ICD-10-CM

## 2022-08-10 DIAGNOSIS — R94.31 ABNORMAL ELECTROCARDIOGRAM [ECG] [EKG]: ICD-10-CM

## 2022-08-10 DIAGNOSIS — Z95.810 PRESENCE OF AUTOMATIC (IMPLANTABLE) CARDIAC DEFIBRILLATOR: Chronic | ICD-10-CM

## 2022-08-10 DIAGNOSIS — R07.89 OTHER CHEST PAIN: ICD-10-CM

## 2022-08-10 DIAGNOSIS — I48.91 UNSPECIFIED ATRIAL FIBRILLATION: ICD-10-CM

## 2022-08-10 DIAGNOSIS — R06.02 SHORTNESS OF BREATH: ICD-10-CM

## 2022-08-10 DIAGNOSIS — Z95.810 PRESENCE OF AUTOMATIC (IMPLANTABLE) CARDIAC DEFIBRILLATOR: ICD-10-CM

## 2022-08-10 DIAGNOSIS — I49.3 VENTRICULAR PREMATURE DEPOLARIZATION: ICD-10-CM

## 2022-08-10 DIAGNOSIS — R14.0 ABDOMINAL DISTENSION (GASEOUS): ICD-10-CM

## 2022-08-10 DIAGNOSIS — I71.4 ABDOMINAL AORTIC ANEURYSM, WITHOUT RUPTURE: ICD-10-CM

## 2022-08-10 DIAGNOSIS — Z79.01 LONG TERM (CURRENT) USE OF ANTICOAGULANTS: ICD-10-CM

## 2022-08-10 DIAGNOSIS — Z53.29 PROCEDURE AND TREATMENT NOT CARRIED OUT BECAUSE OF PATIENT'S DECISION FOR OTHER REASONS: ICD-10-CM

## 2022-08-10 LAB
ALBUMIN SERPL ELPH-MCNC: 4.1 G/DL — SIGNIFICANT CHANGE UP (ref 3.5–5.2)
ALP SERPL-CCNC: 113 U/L — SIGNIFICANT CHANGE UP (ref 30–115)
ALT FLD-CCNC: 38 U/L — SIGNIFICANT CHANGE UP (ref 0–41)
ANION GAP SERPL CALC-SCNC: 10 MMOL/L — SIGNIFICANT CHANGE UP (ref 7–14)
AST SERPL-CCNC: 34 U/L — SIGNIFICANT CHANGE UP (ref 0–41)
BASOPHILS # BLD AUTO: 0.06 K/UL — SIGNIFICANT CHANGE UP (ref 0–0.2)
BASOPHILS NFR BLD AUTO: 0.7 % — SIGNIFICANT CHANGE UP (ref 0–1)
BILIRUB SERPL-MCNC: 0.5 MG/DL — SIGNIFICANT CHANGE UP (ref 0.2–1.2)
BUN SERPL-MCNC: 11 MG/DL — SIGNIFICANT CHANGE UP (ref 10–20)
CALCIUM SERPL-MCNC: 9 MG/DL — SIGNIFICANT CHANGE UP (ref 8.5–10.1)
CHLORIDE SERPL-SCNC: 100 MMOL/L — SIGNIFICANT CHANGE UP (ref 98–110)
CO2 SERPL-SCNC: 26 MMOL/L — SIGNIFICANT CHANGE UP (ref 17–32)
CREAT SERPL-MCNC: 0.9 MG/DL — SIGNIFICANT CHANGE UP (ref 0.7–1.5)
EGFR: 106 ML/MIN/1.73M2 — SIGNIFICANT CHANGE UP
EOSINOPHIL # BLD AUTO: 0.06 K/UL — SIGNIFICANT CHANGE UP (ref 0–0.7)
EOSINOPHIL NFR BLD AUTO: 0.7 % — SIGNIFICANT CHANGE UP (ref 0–8)
GLUCOSE SERPL-MCNC: 299 MG/DL — HIGH (ref 70–99)
HCT VFR BLD CALC: 45.4 % — SIGNIFICANT CHANGE UP (ref 42–52)
HGB BLD-MCNC: 15.2 G/DL — SIGNIFICANT CHANGE UP (ref 14–18)
IMM GRANULOCYTES NFR BLD AUTO: 0.2 % — SIGNIFICANT CHANGE UP (ref 0.1–0.3)
LIDOCAIN IGE QN: 34 U/L — SIGNIFICANT CHANGE UP (ref 7–60)
LYMPHOCYTES # BLD AUTO: 2.82 K/UL — SIGNIFICANT CHANGE UP (ref 1.2–3.4)
LYMPHOCYTES # BLD AUTO: 33.3 % — SIGNIFICANT CHANGE UP (ref 20.5–51.1)
MCHC RBC-ENTMCNC: 28 PG — SIGNIFICANT CHANGE UP (ref 27–31)
MCHC RBC-ENTMCNC: 33.5 G/DL — SIGNIFICANT CHANGE UP (ref 32–37)
MCV RBC AUTO: 83.8 FL — SIGNIFICANT CHANGE UP (ref 80–94)
MONOCYTES # BLD AUTO: 0.71 K/UL — HIGH (ref 0.1–0.6)
MONOCYTES NFR BLD AUTO: 8.4 % — SIGNIFICANT CHANGE UP (ref 1.7–9.3)
NEUTROPHILS # BLD AUTO: 4.8 K/UL — SIGNIFICANT CHANGE UP (ref 1.4–6.5)
NEUTROPHILS NFR BLD AUTO: 56.7 % — SIGNIFICANT CHANGE UP (ref 42.2–75.2)
NRBC # BLD: 0 /100 WBCS — SIGNIFICANT CHANGE UP (ref 0–0)
PLATELET # BLD AUTO: 208 K/UL — SIGNIFICANT CHANGE UP (ref 130–400)
POTASSIUM SERPL-MCNC: 4.1 MMOL/L — SIGNIFICANT CHANGE UP (ref 3.5–5)
POTASSIUM SERPL-SCNC: 4.1 MMOL/L — SIGNIFICANT CHANGE UP (ref 3.5–5)
PROT SERPL-MCNC: 7 G/DL — SIGNIFICANT CHANGE UP (ref 6–8)
RBC # BLD: 5.42 M/UL — SIGNIFICANT CHANGE UP (ref 4.7–6.1)
RBC # FLD: 13 % — SIGNIFICANT CHANGE UP (ref 11.5–14.5)
SARS-COV-2 RNA SPEC QL NAA+PROBE: SIGNIFICANT CHANGE UP
SODIUM SERPL-SCNC: 136 MMOL/L — SIGNIFICANT CHANGE UP (ref 135–146)
TROPONIN T SERPL-MCNC: <0.01 NG/ML — SIGNIFICANT CHANGE UP
WBC # BLD: 8.47 K/UL — SIGNIFICANT CHANGE UP (ref 4.8–10.8)
WBC # FLD AUTO: 8.47 K/UL — SIGNIFICANT CHANGE UP (ref 4.8–10.8)

## 2022-08-10 PROCEDURE — 74174 CTA ABD&PLVS W/CONTRAST: CPT | Mod: 26,MA

## 2022-08-10 PROCEDURE — 93010 ELECTROCARDIOGRAM REPORT: CPT

## 2022-08-10 PROCEDURE — 71275 CT ANGIOGRAPHY CHEST: CPT | Mod: 26,MA

## 2022-08-10 PROCEDURE — 71045 X-RAY EXAM CHEST 1 VIEW: CPT | Mod: 26

## 2022-08-10 PROCEDURE — 99285 EMERGENCY DEPT VISIT HI MDM: CPT

## 2022-08-10 NOTE — ED PROVIDER NOTE - ATTENDING APP SHARED VISIT CONTRIBUTION OF CARE
48 yo m with pmh of chf, aicd, afib on eliquis, aaa, presents with 3 days of L chest pain.  pt denies sob.  pt says pain extends to the side where his aicd is.  no n/v/d, no abd pain, no headache or focal neuro sx.  exam: nad, ncat, perrl, eomi, mmm, rrr, ctab, abd soft, nt, nd aox3, all ext with equal pulses imp: pt with aaa and thoracic aneurysm, will check labs, ekg, cta dissection study

## 2022-08-10 NOTE — ED ADULT TRIAGE NOTE - CHIEF COMPLAINT QUOTE
shortness of breath with left sided chest pain  x 1 day. hx of defibrillator shortness of breath with left sided chest pain  x 1 day. hx of defibrillator. hx of AAA

## 2022-08-10 NOTE — ED PROVIDER NOTE - CLINICAL SUMMARY MEDICAL DECISION MAKING FREE TEXT BOX
Pt with chest pain, no dissection, aneurysms are stable, offered obs, last stress was 2019, cath was 2018 clean, but pt wants to f/u with dr. brea hightower.  The patient wishes to leave against medical advice.  I have discussed the risks, benefits and alternatives (including the possibility of worsening of disease, pain, permanent disability, and/or death) with the patient and his/her family (if available).  The patient voices understanding of these risks, benefits, and alternatives and still wishes to sign out against medical advice.  The patient is awake, alert, oriented  x 3 and has demonstrated capacity to refuse/direct care.  I have advised the patient that they can and should return immediately should they develop any worse/different/additional symptoms, or if they change their mind and want to continue their care.

## 2022-08-10 NOTE — ED PROVIDER NOTE - OBJECTIVE STATEMENT
47 year old male with a history of AAA, Heart Failure, AICD, Afib on Eliquis (LD this am) presents to the ED with chest pain and shortness of breath. Chest pain ongoing for 3 days located on left lateral chest wall and right upper chest, 6/10 intensity, intermittent but non exertional. Shortness of breath started yesterday worse with exertion not associated with fever or cough. Admits to some abdominal distension, denies abdominal pain, nausea, vomiting, dysuria. Denies smoking and leg swelling but reports taking a 12 hour flight 1 month ago. Patient had COVID booster today. Last imaging of AAA revealed 4.4cm.
not examined

## 2022-08-10 NOTE — ED PROVIDER NOTE - NS ED ROS FT
Constitutional: (-) fever/chills   Eyes/ENT: (-) blurry vision, (-) epistaxis  Cardiovascular: (+) chest pain, (-) syncope  Respiratory: (-) cough, (+) shortness of breath  Gastrointestinal: (-) vomiting, (-) diarrhea  Musculoskeletal: (-) neck pain, (-) back pain, (-) joint pain  Integumentary: (-) rash, (-) edema  Neurological: (-) headache, (-) altered mental status  Psychiatric: (-) hallucinations  Allergic/Immunologic: (-) pruritus

## 2022-08-10 NOTE — ED PROVIDER NOTE - NSFOLLOWUPINSTRUCTIONS_ED_ALL_ED_FT
Please follow up with Dr. Patel outpatient. please return to the ED if you experience new or worsening symptoms.     Chest Pain    Chest pain can be caused by many different conditions which may or may not be dangerous. Causes include heartburn, lung infections, heart attack, blood clot in lungs, skin infections, strain or damage to muscle, cartilage, or bones, etc. Lab tests or other studies including an electrocardiogram (EKG) may have been performed to find the cause of your pain. Make sure to follow up with a cardiologist or as instructed by your health care professional.    SEEK IMMEDIATE MEDICAL CARE IF YOU HAVE THE FOLLOWING SYMPTOMS: worsening chest pain, coughing up blood, unexplained back/neck/jaw pain, severe abdominal pain, dizziness or lightheadedness, shortness of breath, sweaty or clammy skin, vomiting, or racing heart beat. These symptoms may represent a serious problem that is an emergency. Do not wait to see if the symptoms will go away. Get medical help right away. Call your local emergency services (911 in the [place] Do not drive yourself to the hospital.      Shortness of breath    Shortness of breath means you have trouble breathing and could indicate a medical problem. Causes include lung diseases, heart disease, low amount of red blood cells (anemia), poor physical fitness, being overweight, smoking, etc. Your health care provider may not be able to find a cause for your shortness of breath after your exam. In this case, it is important to have a follow-up exam with your primary care physician as instructed. If medicines were prescribed, take them as directed for the full length of time directed. Refrain from tobacco products.    SEEK IMMEDIATE MEDICAL CARE IF YOU HAVE THE FOLLOWING SYMPTOMS: worsening shortness of breath, chest pain, back pain, abdominal pain, fever, coughing up blood, lightheadedness/dizziness.

## 2022-08-10 NOTE — ED PROVIDER NOTE - PHYSICAL EXAMINATION
Physical Exam    Constitutional: No acute distress.   Eyes: Conjunctiva pink, Sclera clear, PERRLA, EOMI.  ENT: No sinus tenderness. No nasal discharge. No oropharyngeal erythema, edema, or exudates. Uvula midline.   Cardiovascular: Regular rate, irregular rhythm. No noted murmurs rubs or gallops.  Respiratory: unlabored respiratory effort, crackles at lung base.   Gastrointestinal: Normal bowel sounds. soft, non distended, non-tender abdomen.   Musculoskeletal: supple neck, no midline tenderness. No joint or bony deformity. No lower extremity edema.   Integumentary: warm, dry, no rash  Neurologic: awake, alert, cranial nerves II-XII grossly intact, extremities’ motor and sensory functions grossly intact  Psychiatric: appropriate mood, appropriate affect

## 2022-08-10 NOTE — ED PROVIDER NOTE - PATIENT PORTAL LINK FT
You can access the FollowMyHealth Patient Portal offered by [facility] by registering at the following website: [email or web address]. By joining [email or web address]’s FollowMyHealth portal, you will also be able to view your health information using other applications (apps) compatible with our system.

## 2022-12-08 NOTE — ED PROVIDER NOTE - PATIENT PORTAL LINK FT
I called the patient to schedule their free one hour Enhanced Annual Wellness Visit.     You can access the FollowMyHealth Patient Portal offered by [facility] by registering at the following website: [email or web address]. By joining [facility]’s FollowMyHealth portal, you will also be able to view your health information using other applications (apps) compatible with our system.

## 2022-12-14 NOTE — ED PROVIDER NOTE - NS ED ROS FT
Patient was informed of below messages. Patient stated that the cramping is very mild. Patient states that she will reach out to us if the symptoms do worsen. Please advise. Constitutional: (-) fever (-) malaise (-) diaphoresis (-) chills   Eyes: (-) visual changes  Neck: (-) neck pain (-) neck stiffness  Cardiac: (+) chest pain  (-) palpitations (-) syncope (-) edema  Respiratory: (-) cough (+) SOB (-) CHAMBERLAIN  GI: (-) nausea (-) vomiting (-) diarrhea (-) abdominal pain   MS: (-) back pain   Neuro: (-) headache (-) dizziness (-) numbness/tingling to extremities B/L (-) weakness   Skin: (-) rash (-) laceration    Except as documented in the HPI, all other systems are negative.

## 2023-01-04 NOTE — ED ADULT NURSE NOTE - NEURO WDL
Alert and oriented to person, place and time, memory intact, behavior appropriate to situation, PERRL. Hemigard Intro: Due to skin fragility and wound tension, it was decided to use HEMIGARD adhesive retention suture devices to permit a linear closure. The skin was cleaned and dried for a 6cm distance away from the wound. Excessive hair, if present, was removed to allow for adhesion.

## 2023-04-18 NOTE — PATIENT PROFILE ADULT - ...
Topical Clindamycin Counseling: Patient counseled that this medication may cause skin irritation or allergic reactions.  In the event of skin irritation, the patient was advised to reduce the amount of the drug applied or use it less frequently.   The patient verbalized understanding of the proper use and possible adverse effects of clindamycin.  All of the patient's questions and concerns were addressed. 29-Nov-2019 22:14:13

## 2023-06-01 NOTE — PROGRESS NOTE ADULT - PROVIDER SPECIALTY LIST ADULT
Internal Medicine Elidel Pregnancy And Lactation Text: This medication is Pregnancy Category C. It is unknown if this medication is excreted in breast milk.

## 2023-09-15 NOTE — ED ADULT TRIAGE NOTE - TEMPERATURE IN FAHRENHEIT (DEGREES F)
OPERATION NOTE    REFERRING PHYSICIAN: Nafisa Duong MD    DATE:  09/14/2023    PREOPERATIVE DIAGNOSIS:  Melanoma of the back, superficial spreading, 1.3 mm thickness, Clinical Stage 1B (cT2a cNx cMx).    POSTOPERATIVE DIAGNOSIS:  Same    PROCEDURES PERFORMED:    1. Left axillary sentinel lymph node biopsy.  2. Right axillary sentinel lymph node biopsy.  3. Wide local excision of melanoma site, 1.5 cm margins, resection area of 4 cm x 7 cm.    ANESTHESIA:  General plus local (with Exparel).    PREP:  ChloraPrep.    ESTIMATED BLOOD LOSS:  Minimal.    COMPLICATIONS:  None.    INDICATIONS:  This very pleasant 68-year-old gentleman was found to have a changing mole of the back with biopsy findings as noted above.  The patient presents for the above procedures.  The patient understands the surgical plan. He understands that adjuvant therapy decisions will be based on the final surgical  pathology.  The procedure and risks were reviewed and informed consent was obtained.    PROCEDURE IN DETAIL:  The patient was brought to the operating room and placed supine on the operating room table.  Intravenous sedation was given.  Prophylactic antibiotics were administered.  Sequential compression devices were applied to the lower extremities for DVT prophylaxis.  General anesthesia was induced without difficulty or incident.      The chest and bilateral axillae with upper extremities were prepped and draped in a sterile fashion for performance of the sentinel lymph node staging. Pre-operative lymphoscintigraphy localized to the bilateral axillae.      The left axillary sentinel lymph node was performed first. Preoperative lymphoscintigraphy localized a focus of activity in the mid to upper axilla, deep.  The axilla was infiltrated with local anesthetic.  A small transverse incision was made in a low transverse axillary skin line.  Dissection was performed with the LigaSure and guided by the Neoprobe.  The focus of activity was  found deep in the axilla at the junction of level 1 and level 2.  The area corresponded to a large lymph node of approximately 2.5 cm with an adjacent smaller lymph node of about 7 mm adherent to it.  It was excised with the LigaSure.  Ultimately, the high activity ex vivo corresponded to the small lymph node, which was then our true sentinel lymph node. There was some activity in the larger lymph node, but it did not meet sentinel criteria.  The lymph nodes were labeled appropriately and forwarded separately in formalin to pathology.  No other foci of high activity were identified in the axilla with minimal residual background counts.  There were no other enlarged or palpably suspicious lymph nodes identified.  The lymph nodes were placed in formalin and forwarded to pathology. The axilla was irrigated copiously.  Excellent hemostasis was obtained.  Then, the subcutaneous tissues and the deep dermis were approximated with interrupted 3-0 Vicryl and the skin was closed with running 4-0 Monocryl in a subcuticular fashion.    Attention was then turned to the other site of sentinel activity, which was the right axilla.  This was a single focus of high activity in the low axilla.  The right axilla was infiltrated with local anesthetic and a small transverse skin incision was made in naturally occurring skin line in the low axilla.  Dissection was performed with the LigaSure and guided by the Neoprobe.  The activity corresponded to a single lymph node, approximately 1.5 cm in size.  This was excised with the LigaSure and high counts were verified ex vivo.  No other foci of high activity were identified in the right axilla.  There were no grossly suspicious lymph nodes visualized or palpable lymph nodes in the right axilla.  The lymph node was placed in formalin and forwarded to pathology.  The axilla was now irrigated copiously and excellent hemostasis verified.  Then, the deep dermal layer was closed with interrupted  3-0 Vicryl and the skin was closed with running 4-0 Monocryl in a subcuticular fashion.  Steri-Strips and sterile dressings were applied to both sites.    The wide excision of the melanoma site was now performed.  The  patient was now carefully turned onto his sided,  left-side down..  The site of the melanoma of the upper back and a skin excision pattern had previously been drawn onto the patient's back at the site.  The site was infiltrated with local anesthetic.  The elliptical pattern was now incised circumferentially.  This was a 4 x 7 cm ellipse which encompassed the previous scar from the initial excision.  The excision was full-thickness skin as well as full thickness of the subcutaneous fat in this region.  The specimen was appropriately oriented and labeled and placed in formalin.  It was forwarded to pathology.  The specimen was grossly clear of residual melanoma to careful inspection.  Now, subcutaneous skin flaps were raised  to permit tension-free closure, predominantly inferiorly from the site, but also slightly medially and superiorly  The flap extension inferiorly was 3 cm, medially 1 cm,  and superiorly 1 cm.  The site was irrigated copiously with excellent hemostasis verified.  Additional local anesthetic was infiltrated in the depths of the surgical wound, subfascial as well as in the subcutaneous tissues and skin circumferentially to assist with postoperative pain control.  Then, the deep dermal layer was closed with interrupted 2-0 Vicryl and the skin closed with interrupted 3-0 nylon alternating simple and vertical mattress sutures.  An excellent tension-free closure was obtained.      The site was then further dressed with Dermabond and Steri-Strips and a sterile dressing with a Tegaderm covering was now applied.  The patient was now awakened and taken to the recovery room in stable condition.  The preliminary results were shared with the patient's daughter and the significant other.  Limited  information could be transmitted to the patient at the time as he was very sleepy post anesthesia.  All questions were answered for the family.      Wide Local Excision for Primary Cutaneous Melanoma - Excision 1 (Upper Back)  Operation performed with curative intent Yes   Original Breslow thickness of the lesion 1.3 mm (to the tenth of a millimeter)   Clinical margin width 2 cm   Depth of excision Full-thickness skin/subcutaneous tissue down to fascia (melanoma)       Axillary [place] Node Biopsy for Melanoma - Left  Operation performed with curative intent Yes   Tracer(s) used to identify sentinel nodes in the upfront surgery (non-neoadjuvant) setting Radioactive tracer   Tracer(s) used to identify sentinel nodes in the neoadjuvant setting N/A   All nodes (colored or non-colored) present at the end of a dye-filled lymphatic channel were removed N/A   All significantly radioactive nodes were removed Yes   All palpably suspicious nodes were removed Yes Comment:  Large LN adjacent to the SLN with some radiotracer activity but did not meet sentinel criteria   Biopsy-proven positive nodes marked with clips prior to chemotherapy were identified and removed N/A      Axillary [place] Node Biopsy for Melanoma - Right  Operation performed with curative intent Yes   Tracer(s) used to identify sentinel nodes in the upfront surgery (non-neoadjuvant) setting Radioactive tracer   Tracer(s) used to identify sentinel nodes in the neoadjuvant setting N/A   All nodes (colored or non-colored) present at the end of a dye-filled lymphatic channel were removed N/A   All significantly radioactive nodes were removed Yes   All palpably suspicious nodes were removed N/A   Biopsy-proven positive nodes marked with clips prior to chemotherapy were identified and removed N/A      Dictated By:  Nafisa Duong MD    D:  09/15/2023 08:09:55  T:  09/15/2023 09:15:03  FLORIDALMA/marla  Job:  [id number]   97.6

## 2024-02-12 NOTE — PATIENT PROFILE ADULT - NSPROMEDSPUMP_GEN_A_NUR
Continue Regimen: hydrocortisone 2.5 % topical ointment to rash of face and ears bid x up to 2wks/mo prn flares\\nCetaphil cleanser or dove sensitive skin cleanser\\nVaseline bid and prn as moisturizer\\nkeep skin cool (ie cool/ice compresses prn itch) Discontinue Regimen: Panteen shampoo and conditioner \\nHead and shoulders shampoo * Initiate Treatment: tacrolimus 0.1 % topical ointment: apply to rash of face and ears BID x up to 2 wks then d/c. if rash still present, restart hc 2.5% oint BID x 2 wks. \\nSeen shampoo and conditioner Detail Level: Zone Defer Treatment (Provide Reason For Deferment In Text Field Below): once rash resolved, ok to restart skinmedicinals triple cream qday-bid prn flares Render In Strict Bullet Format?: No Plan: * favouring H&S shampoo as culprit (uses 1-2x/mo, usu q2wks)\\nRTO 1 mo or sooner prn\\nR/B/A reviewed, all questions answered. T/C patch testing if continues to flare Samples Given: Seen shampoo and conditioner none

## 2024-08-05 NOTE — ED ADULT TRIAGE NOTE - ACCOMPANIED BY
Medication history complete, patient is from [facility], reviewed and confirmed medication and allergies with list provided by facility.
Self

## 2024-09-26 NOTE — ED PROVIDER NOTE - NS ED MD DISPO ISOLATION TYPES
I introduced myself to Romina and let her know that her breast surgeon reached out to the cancer risk and genetics program on her behalf.    I reviewed the following with Romina:    While the majority of cancer occurs by chance, approximately 5-10% of breast cancer has an underlying genetic cause.  Genetic testing is available which can determine if there is an underlying genetic cause to your cancer.  Understanding if there is an underlying genetic cause can:  Provide your surgeon with additional information to help with surgical decisions, treatment decisions and eligibility for clinical trials.    It can determine if you have an increased risk for any additional cancers.  Help family members understand their cancer risk.       We work closely with the [facility] breast surgeons and are reaching out to see if you have interest in genetic testing. The reason we are reaching out at this time is that this result may help your surgeon determine the appropriate type of surgery (i.e. lumpectomy vs mastectomy). This test is not a requirement but can take 5-10 days to complete so we would like to start the process as soon as possible so the results are ready for your appointment with your surgeon.       If you are interested in genetic testing, I can collect your family history and initiate genetic testing for you.        Patient elected to pursue testing     Diagnosis Details:  Invasive Lobular Carcinoma  Right  Hormone receptors pending  Metastatic    Personal History:  Do you have a personal history of any other cancer? No  If yes type/age of diagnosis:     Family history:   Do you have Ashkenazi [employer] ancestry? No  If yes, maternal, paternal, or both?    Do you have any children? Yes  How many sons? 1  How many daughters? 2  Do any of your childr0.0en have a history of cancer? No  If yes type/age of diagnosis:     Do you have any brothers or sisters? Yes  How many brothers? 1  How many sisters? 1  Are they from the same  parents? Yes  If no how maternal/paternal half-siblings:  Do any of your brothers or sisters have a history of cancer? No  If yes who and the type/age of diagnosis:           Do you have nieces or nephews? Yes  Do any of them have a history of cancer? No  If yes type/age of diagnosis:    Does your mother have a history of cancer? Yes  If yes, cancer type and age of diagnosis: Colon Cancer age 55  Is your mother still living? Yes  Age/Age of death: 74    Thinking about your mother's family (aunts, uncles, cousins, grandparents) is there anyone with a history of cancer? Yes  If yes, list relationship, cancer type and age of diagnosis:  MGM - Colon Cancer age 75 (d. 91)  MGF - Throat Cancer age 63 ()  Maternal Uncle - Colon Cancer age 50 (d. 55)    Does your father have a history of cancer? No  If yes, cancer type and age of diagnosis:   Is your father still living? No  Age/age of death: 83    Thinking about your father's family (aunts, uncles, cousins, grandparents) is there anyone with a history of cancer? Yes  If yes, list relationship, cancer type and age of diagnosis:   Paternal Aunt - Breast Cancer age 77     Types of Results - Positive, Negative, VUS  Positive Result - May explain personal diagnosis/family history. Can give surgeon information on treatment plan, inform future screening/management or tell a person about other possible risks. Positive results can initiate testing for other family members who may be at risk (children, siblings, etc)  Negative Result - Does not give an explanation. Surgical/treatment plan will be based on clinical presentation and will be part of discussion with surgeon. Negative result cannot be passed down to children, but they are still at elevated risk.  Uncertain Result - Common, but treated like a negative result clinically. 90% are downgraded over time.     [facility]'s billing policy   Most insurance plans cover the cost of genetic testing. The out-of-pocket cost  varies due to the differences in deductibles, co-payments and co-insurance defined by individual plans but 90% of people pay $100 or less for a genetic test     A blood kit will be mailed to you overnight. Please take the blood kit along with packet of paperwork to any [facility]'s lab to have your blood drawn.    We have genetic counselors available, if you have any additional questions or would like to speak with them we can schedule you a 15 minute appointment.      Plan:  A blood kit was mailed out on 9/26/2024 along with information on genetic testing and the lab's billing policy.     Genetic Testing Preformed: [facility] BRCAplus STAT Panel (13 genes): HELGA, BARD1, BRCA1, BRCA2, CDH1, CHEK2, NF1, PALB2, PTEN, RAD51C, RAD51D, STK11, TP53 with reflex to [facility] [employer]: Cancer+RNAinsight (59 genes): APC, HELGA, AXIN2, BAP1, BARD1, BMPR1A, BRCA1, BRCA2, BRIP1, CDH1, CDK4, CDKN1B, CDKN2A, CHEK2, CTNNA1, DICER1, EGLN1, EPCAM, FH, FLCN, GREM1, HOXB13, KIF1B, KIT, MAX, MEN1, MET, MITF, MLH1, MSH2, MSH3, MSH6, MUTYH, NF1, NTHL1, PALB2, PDGFRA PMS2, POLD1, POLE, POT1, PTEN, RAD51C, RAD51D, RB1, RET, SDHA, SDHAF2, SDHB, SDHC, SDHD, SMAD4, SMARCA4, STK11, BXJU619, TP53, TSC1, TSC2, VHL     Result Call Information:  I confirmed the patient's mobile number on file as the best number to call with results  I confirmed with the patient that we can leave a voicemail on the provided numbers    Initial results will take approximately 5-12 days to return     Additional results may take up to 2-3 weeks to complete once test is started.    Patient does not have any further questions, declined meeting with genetic counselor    When your results are ready, someone from the genetics team will call you, review the results, and contact your breast surgeon. You will be contacted with any type of result- positive, negative, or uncertain.        None

## 2025-07-09 NOTE — ED ADULT NURSE NOTE - NSSUHOSCREENINGYN_ED_ALL_ED
[facility]  [facility]  [address]  [facility] [id number] [phone]    Orthopaedic Clinic Note-Postop    NAME:  Alvaro Pena   : 1949  MRN: [id number]    7/10/2025     CHIEF COMPLAINT: Status post left ankle hardware removal of tibia    HISTORY OF PRESENT ILLNESS:   Alvaro returns today for follow up of the left ankle.  Pain has resolved. There have been no postoperative complications to date.         Past Medical History:        Diagnosis Date    Achalasia     going to [place] for surgery in March    Acute pancreatitis     Anemia     Asthma     Atrial fibrillation (HCC)     h/o paroxysmal a-fib ? anesthsia induced    Benign colonic polyp     cscope  Dr Majano adenomatous:  adenoma    Chest pain     Chronic kidney disease     sees [facility] kidney    Chronic pain syndrome     Depression     Diabetic peripheral neuropathy (HCC)     Extrinsic asthma     Hyperlipidemia     Hypertension     Idiopathic peripheral neuropathy     Lyme disease     hx of tick bite    Mediastinal lymphadenopathy     Microalbuminuria     Paroxysmal A-fib (HCC)     sees [facility] cardiology    S/P ablation of atrial fibrillation 2016    A fib ablation , Dr Chaparro, [place]    Sarcoidosis, lung     restrictive lung impairment    Type 2 diabetes, controlled, with neuropathy (HCC)        Past Surgical History:        Procedure Laterality Date    ANKLE FRACTURE SURGERY  2015    ANKLE SURGERY Left 2025    LEFT ANKLE HARDWARE REMOVAL OF TIBIA performed by Logan Prasad MD at [facility] OR    ATRIAL ABLATION SURGERY      Dr. Chaparro- [place]    BACK SURGERY  10/06/2020    CHOLECYSTECTOMY      COLONOSCOPY  2014    [facility]    CYSTOSCOPY Left 2019    CYSTOSCOPY; LEFT RETROGRADE PYELOGRAM; INSERTION LEFT URETERAL STENT performed by Mario Barone MD at [facility] OR    EYE SURGERY      MOHS SURGERY      MO CYSTO BLADDER W/URETERAL 
Yes - the patient is able to be screened

## 2025-07-21 NOTE — ED PROVIDER NOTE - NS ED MD DISPO ISOLATION TYPES
Continue 137 mcg of Synthroid and decrease dose of liothyronine to 20 mcg/day    Pt picked up 450 tablets on 7/10/      None